# Patient Record
Sex: FEMALE | Race: BLACK OR AFRICAN AMERICAN | Employment: UNEMPLOYED | URBAN - METROPOLITAN AREA
[De-identification: names, ages, dates, MRNs, and addresses within clinical notes are randomized per-mention and may not be internally consistent; named-entity substitution may affect disease eponyms.]

---

## 2021-11-03 ENCOUNTER — HOSPITAL ENCOUNTER (INPATIENT)
Age: 71
LOS: 15 days | Discharge: HOME | DRG: 177 | End: 2021-11-18
Attending: EMERGENCY MEDICINE | Admitting: INTERNAL MEDICINE
Payer: MEDICARE

## 2021-11-03 ENCOUNTER — APPOINTMENT (OUTPATIENT)
Dept: GENERAL RADIOLOGY | Age: 71
DRG: 177 | End: 2021-11-03
Attending: PHYSICIAN ASSISTANT
Payer: MEDICARE

## 2021-11-03 ENCOUNTER — APPOINTMENT (OUTPATIENT)
Dept: CT IMAGING | Age: 71
DRG: 177 | End: 2021-11-03
Attending: PHYSICIAN ASSISTANT
Payer: MEDICARE

## 2021-11-03 DIAGNOSIS — J12.82 PNEUMONIA DUE TO COVID-19 VIRUS: Primary | ICD-10-CM

## 2021-11-03 DIAGNOSIS — R06.02 SOB (SHORTNESS OF BREATH): ICD-10-CM

## 2021-11-03 DIAGNOSIS — U07.1 PNEUMONIA DUE TO COVID-19 VIRUS: Primary | ICD-10-CM

## 2021-11-03 DIAGNOSIS — J96.01 ACUTE RESPIRATORY FAILURE WITH HYPOXIA (HCC): ICD-10-CM

## 2021-11-03 DIAGNOSIS — I10 ESSENTIAL HYPERTENSION: ICD-10-CM

## 2021-11-03 DIAGNOSIS — E87.6 HYPOKALEMIA: ICD-10-CM

## 2021-11-03 PROBLEM — J96.00 ACUTE RESPIRATORY FAILURE DUE TO COVID-19 (HCC): Status: ACTIVE | Noted: 2021-11-03

## 2021-11-03 PROBLEM — E11.9 DIABETES MELLITUS TYPE 2, CONTROLLED (HCC): Status: ACTIVE | Noted: 2021-11-03

## 2021-11-03 LAB
ALBUMIN SERPL-MCNC: 3 G/DL (ref 3.4–5)
ALBUMIN/GLOB SERPL: 0.7 {RATIO} (ref 0.8–1.7)
ALP SERPL-CCNC: 101 U/L (ref 45–117)
ALT SERPL-CCNC: 56 U/L (ref 13–56)
ANION GAP SERPL CALC-SCNC: 5 MMOL/L (ref 3–18)
AST SERPL-CCNC: 155 U/L (ref 10–38)
BASE EXCESS BLD CALC-SCNC: 3.8 MMOL/L
BASOPHILS # BLD: 0 K/UL (ref 0–0.1)
BASOPHILS NFR BLD: 0 % (ref 0–2)
BDY SITE: ABNORMAL
BILIRUB SERPL-MCNC: 1.1 MG/DL (ref 0.2–1)
BUN SERPL-MCNC: 15 MG/DL (ref 7–18)
BUN/CREAT SERPL: 18 (ref 12–20)
CALCIUM SERPL-MCNC: 8.4 MG/DL (ref 8.5–10.1)
CHLORIDE SERPL-SCNC: 96 MMOL/L (ref 100–111)
CK SERPL-CCNC: 2873 U/L (ref 26–192)
CO2 SERPL-SCNC: 30 MMOL/L (ref 21–32)
COVID-19 RAPID TEST, COVR: DETECTED
CREAT SERPL-MCNC: 0.84 MG/DL (ref 0.6–1.3)
D DIMER PPP FEU-MCNC: 7.84 UG/ML(FEU)
DIFFERENTIAL METHOD BLD: ABNORMAL
EOSINOPHIL # BLD: 0 K/UL (ref 0–0.4)
EOSINOPHIL NFR BLD: 0 % (ref 0–5)
ERYTHROCYTE [DISTWIDTH] IN BLOOD BY AUTOMATED COUNT: 12.9 % (ref 11.6–14.5)
FLUAV AG NPH QL IA: NEGATIVE
FLUBV AG NOSE QL IA: NEGATIVE
GAS FLOW.O2 O2 DELIVERY SYS: ABNORMAL L/MIN
GLOBULIN SER CALC-MCNC: 4.3 G/DL (ref 2–4)
GLUCOSE SERPL-MCNC: 122 MG/DL (ref 74–99)
HCO3 BLD-SCNC: 26.9 MMOL/L (ref 22–26)
HCT VFR BLD AUTO: 35.8 % (ref 35–45)
HGB BLD-MCNC: 11 G/DL (ref 12–16)
LACTATE BLD-SCNC: 1.94 MMOL/L (ref 0.4–2)
LYMPHOCYTES # BLD: 1.6 K/UL (ref 0.9–3.6)
LYMPHOCYTES NFR BLD: 24 % (ref 21–52)
MAGNESIUM SERPL-MCNC: 1.8 MG/DL (ref 1.6–2.6)
MCH RBC QN AUTO: 26.3 PG (ref 24–34)
MCHC RBC AUTO-ENTMCNC: 30.7 G/DL (ref 31–37)
MCV RBC AUTO: 85.6 FL (ref 78–100)
MONOCYTES # BLD: 0.4 K/UL (ref 0.05–1.2)
MONOCYTES NFR BLD: 6 % (ref 3–10)
NEUTS SEG # BLD: 4.6 K/UL (ref 1.8–8)
NEUTS SEG NFR BLD: 69 % (ref 40–73)
PCO2 BLD: 34.5 MMHG (ref 35–45)
PH BLD: 7.5 [PH] (ref 7.35–7.45)
PLATELET # BLD AUTO: 175 K/UL (ref 135–420)
PMV BLD AUTO: 11.3 FL (ref 9.2–11.8)
PO2 BLD: 50 MMHG (ref 80–100)
POTASSIUM SERPL-SCNC: 3.3 MMOL/L (ref 3.5–5.5)
PROT SERPL-MCNC: 7.3 G/DL (ref 6.4–8.2)
RBC # BLD AUTO: 4.18 M/UL (ref 4.2–5.3)
SAO2 % BLD: 88.6 % (ref 92–97)
SERVICE CMNT-IMP: ABNORMAL
SODIUM SERPL-SCNC: 131 MMOL/L (ref 136–145)
SOURCE, COVRS: ABNORMAL
SPECIMEN TYPE: ABNORMAL
WBC # BLD AUTO: 6.7 K/UL (ref 4.6–13.2)

## 2021-11-03 PROCEDURE — 82550 ASSAY OF CK (CPK): CPT

## 2021-11-03 PROCEDURE — 36600 WITHDRAWAL OF ARTERIAL BLOOD: CPT

## 2021-11-03 PROCEDURE — 83520 IMMUNOASSAY QUANT NOS NONAB: CPT

## 2021-11-03 PROCEDURE — 85379 FIBRIN DEGRADATION QUANT: CPT

## 2021-11-03 PROCEDURE — 83605 ASSAY OF LACTIC ACID: CPT

## 2021-11-03 PROCEDURE — 84145 PROCALCITONIN (PCT): CPT

## 2021-11-03 PROCEDURE — 96360 HYDRATION IV INFUSION INIT: CPT

## 2021-11-03 PROCEDURE — 74011250636 HC RX REV CODE- 250/636: Performed by: PHYSICIAN ASSISTANT

## 2021-11-03 PROCEDURE — 71045 X-RAY EXAM CHEST 1 VIEW: CPT

## 2021-11-03 PROCEDURE — 82728 ASSAY OF FERRITIN: CPT

## 2021-11-03 PROCEDURE — 87040 BLOOD CULTURE FOR BACTERIA: CPT

## 2021-11-03 PROCEDURE — 74011250637 HC RX REV CODE- 250/637: Performed by: INTERNAL MEDICINE

## 2021-11-03 PROCEDURE — 65270000029 HC RM PRIVATE

## 2021-11-03 PROCEDURE — 85025 COMPLETE CBC W/AUTO DIFF WBC: CPT

## 2021-11-03 PROCEDURE — 99285 EMERGENCY DEPT VISIT HI MDM: CPT

## 2021-11-03 PROCEDURE — 83735 ASSAY OF MAGNESIUM: CPT

## 2021-11-03 PROCEDURE — 87635 SARS-COV-2 COVID-19 AMP PRB: CPT

## 2021-11-03 PROCEDURE — 87804 INFLUENZA ASSAY W/OPTIC: CPT

## 2021-11-03 PROCEDURE — 74011250637 HC RX REV CODE- 250/637: Performed by: PHYSICIAN ASSISTANT

## 2021-11-03 PROCEDURE — 80053 COMPREHEN METABOLIC PANEL: CPT

## 2021-11-03 PROCEDURE — 74011000636 HC RX REV CODE- 636: Performed by: INTERNAL MEDICINE

## 2021-11-03 PROCEDURE — 83615 LACTATE (LD) (LDH) ENZYME: CPT

## 2021-11-03 PROCEDURE — 82803 BLOOD GASES ANY COMBINATION: CPT

## 2021-11-03 PROCEDURE — 65660000000 HC RM CCU STEPDOWN

## 2021-11-03 PROCEDURE — 86140 C-REACTIVE PROTEIN: CPT

## 2021-11-03 PROCEDURE — 83036 HEMOGLOBIN GLYCOSYLATED A1C: CPT

## 2021-11-03 PROCEDURE — 71275 CT ANGIOGRAPHY CHEST: CPT

## 2021-11-03 PROCEDURE — XW033E5 INTRODUCTION OF REMDESIVIR ANTI-INFECTIVE INTO PERIPHERAL VEIN, PERCUTANEOUS APPROACH, NEW TECHNOLOGY GROUP 5: ICD-10-PCS | Performed by: INTERNAL MEDICINE

## 2021-11-03 RX ORDER — ACETAMINOPHEN 650 MG/1
650 SUPPOSITORY RECTAL
Status: DISCONTINUED | OUTPATIENT
Start: 2021-11-03 | End: 2021-11-04 | Stop reason: SDUPTHER

## 2021-11-03 RX ORDER — PANTOPRAZOLE SODIUM 40 MG/10ML
40 INJECTION, POWDER, LYOPHILIZED, FOR SOLUTION INTRAVENOUS DAILY
Status: DISCONTINUED | OUTPATIENT
Start: 2021-11-04 | End: 2021-11-08 | Stop reason: DRUGHIGH

## 2021-11-03 RX ORDER — MELATONIN
2000 DAILY
Status: DISCONTINUED | OUTPATIENT
Start: 2021-11-04 | End: 2021-11-18 | Stop reason: HOSPADM

## 2021-11-03 RX ORDER — POLYETHYLENE GLYCOL 3350 17 G/17G
17 POWDER, FOR SOLUTION ORAL DAILY PRN
Status: DISCONTINUED | OUTPATIENT
Start: 2021-11-03 | End: 2021-11-18 | Stop reason: HOSPADM

## 2021-11-03 RX ORDER — DEXAMETHASONE SODIUM PHOSPHATE 4 MG/ML
6 INJECTION, SOLUTION INTRA-ARTICULAR; INTRALESIONAL; INTRAMUSCULAR; INTRAVENOUS; SOFT TISSUE EVERY 24 HOURS
Status: DISCONTINUED | OUTPATIENT
Start: 2021-11-03 | End: 2021-11-04

## 2021-11-03 RX ORDER — ONDANSETRON 2 MG/ML
4 INJECTION INTRAMUSCULAR; INTRAVENOUS
Status: DISCONTINUED | OUTPATIENT
Start: 2021-11-03 | End: 2021-11-18 | Stop reason: HOSPADM

## 2021-11-03 RX ORDER — SODIUM CHLORIDE 0.9 % (FLUSH) 0.9 %
5-40 SYRINGE (ML) INJECTION EVERY 8 HOURS
Status: DISCONTINUED | OUTPATIENT
Start: 2021-11-03 | End: 2021-11-18 | Stop reason: HOSPADM

## 2021-11-03 RX ORDER — POTASSIUM CHLORIDE 20 MEQ/1
40 TABLET, EXTENDED RELEASE ORAL EVERY 4 HOURS
Status: COMPLETED | OUTPATIENT
Start: 2021-11-03 | End: 2021-11-04

## 2021-11-03 RX ORDER — ENOXAPARIN SODIUM 100 MG/ML
30 INJECTION SUBCUTANEOUS EVERY 12 HOURS
Status: DISCONTINUED | OUTPATIENT
Start: 2021-11-03 | End: 2021-11-18 | Stop reason: HOSPADM

## 2021-11-03 RX ORDER — SODIUM CHLORIDE 0.9 % (FLUSH) 0.9 %
5-40 SYRINGE (ML) INJECTION AS NEEDED
Status: DISCONTINUED | OUTPATIENT
Start: 2021-11-03 | End: 2021-11-18 | Stop reason: HOSPADM

## 2021-11-03 RX ORDER — ZINC SULFATE 50(220)MG
1 CAPSULE ORAL DAILY
Status: DISCONTINUED | OUTPATIENT
Start: 2021-11-04 | End: 2021-11-18 | Stop reason: HOSPADM

## 2021-11-03 RX ORDER — DEXTROSE 50 % IN WATER (D50W) INTRAVENOUS SYRINGE
25-50 AS NEEDED
Status: DISCONTINUED | OUTPATIENT
Start: 2021-11-03 | End: 2021-11-18 | Stop reason: HOSPADM

## 2021-11-03 RX ORDER — GUAIFENESIN/DEXTROMETHORPHAN 100-10MG/5
5 SYRUP ORAL
Status: DISCONTINUED | OUTPATIENT
Start: 2021-11-03 | End: 2021-11-18 | Stop reason: HOSPADM

## 2021-11-03 RX ORDER — CALCIUM CARB/MAGNESIUM CARB 311-232MG
10 TABLET ORAL
Status: DISCONTINUED | OUTPATIENT
Start: 2021-11-03 | End: 2021-11-18 | Stop reason: HOSPADM

## 2021-11-03 RX ORDER — ACETAMINOPHEN 325 MG/1
650 TABLET ORAL
Status: DISCONTINUED | OUTPATIENT
Start: 2021-11-03 | End: 2021-11-04 | Stop reason: SDUPTHER

## 2021-11-03 RX ORDER — SODIUM CHLORIDE 0.9 % (FLUSH) 0.9 %
5-10 SYRINGE (ML) INJECTION AS NEEDED
Status: DISCONTINUED | OUTPATIENT
Start: 2021-11-03 | End: 2021-11-18 | Stop reason: HOSPADM

## 2021-11-03 RX ORDER — HYDRALAZINE HYDROCHLORIDE 20 MG/ML
10 INJECTION INTRAMUSCULAR; INTRAVENOUS
Status: DISCONTINUED | OUTPATIENT
Start: 2021-11-03 | End: 2021-11-18 | Stop reason: HOSPADM

## 2021-11-03 RX ORDER — ACETAMINOPHEN 325 MG/1
650 TABLET ORAL
Status: DISCONTINUED | OUTPATIENT
Start: 2021-11-03 | End: 2021-11-18 | Stop reason: HOSPADM

## 2021-11-03 RX ORDER — ONDANSETRON 4 MG/1
4 TABLET, ORALLY DISINTEGRATING ORAL
Status: DISCONTINUED | OUTPATIENT
Start: 2021-11-03 | End: 2021-11-18 | Stop reason: HOSPADM

## 2021-11-03 RX ORDER — ACETAMINOPHEN 325 MG/1
650 TABLET ORAL
Status: COMPLETED | OUTPATIENT
Start: 2021-11-03 | End: 2021-11-03

## 2021-11-03 RX ORDER — INSULIN LISPRO 100 [IU]/ML
INJECTION, SOLUTION INTRAVENOUS; SUBCUTANEOUS
Status: DISCONTINUED | OUTPATIENT
Start: 2021-11-03 | End: 2021-11-18 | Stop reason: HOSPADM

## 2021-11-03 RX ORDER — MAGNESIUM SULFATE 100 %
4 CRYSTALS MISCELLANEOUS AS NEEDED
Status: DISCONTINUED | OUTPATIENT
Start: 2021-11-03 | End: 2021-11-18 | Stop reason: HOSPADM

## 2021-11-03 RX ORDER — ACETAMINOPHEN 650 MG/1
650 SUPPOSITORY RECTAL
Status: DISCONTINUED | OUTPATIENT
Start: 2021-11-03 | End: 2021-11-18 | Stop reason: HOSPADM

## 2021-11-03 RX ORDER — ASCORBIC ACID 250 MG
500 TABLET ORAL 2 TIMES DAILY
Status: DISCONTINUED | OUTPATIENT
Start: 2021-11-03 | End: 2021-11-18 | Stop reason: HOSPADM

## 2021-11-03 RX ADMIN — SODIUM CHLORIDE 436 ML: 900 INJECTION, SOLUTION INTRAVENOUS at 18:17

## 2021-11-03 RX ADMIN — ACETAMINOPHEN 650 MG: 325 TABLET ORAL at 18:22

## 2021-11-03 RX ADMIN — SODIUM CHLORIDE 1000 ML: 900 INJECTION, SOLUTION INTRAVENOUS at 18:17

## 2021-11-03 RX ADMIN — POTASSIUM CHLORIDE 40 MEQ: 20 TABLET, EXTENDED RELEASE ORAL at 22:00

## 2021-11-03 RX ADMIN — IOPAMIDOL 70 ML: 755 INJECTION, SOLUTION INTRAVENOUS at 20:47

## 2021-11-03 NOTE — H&P
History & Physical    Patient: Gabe Gallo MRN: [id number]  CSN: [id number]    YOB: 1950  Age: 70 y.o. Sex: female      DOA: 11/3/2021    Chief Complaint:   Chief Complaint   Patient presents with    Shortness of Breath          HPI:     Gabe Gallo is a 70 y.o.  female from [facility] non-English speaking who has history of hypertension, hyperlipidemia and diabetes presents to the emergency room with worsening cough shortness of breath and fever for the last 4 days she has several grandchildren and children were sick with Covid. She is unvaccinated. In the emergency room she was found to have sats of 88% which improved with 5 L of oxygen. CTA of the chest was done and was negative for pulmonary embolism. History is obtained from patient,  and her son-in-law  Past Medical History:   Diagnosis Date    Diabetes ([address]     HTN (hypertension)     Hyperlipidemia        History reviewed. No pertinent surgical history. Family History   Problem Relation Age of Onset    Hypertension Mother        Social History     Socioeconomic History    Marital status:    Tobacco Use    Smoking status: Never Smoker    Smokeless tobacco: Never Used   Substance and Sexual Activity    Alcohol use: Yes    Drug use: Never    Sexual activity: Not Currently       Prior to Admission medications    Not on File       No Known Allergies      Review of Systems  GENERAL: Patient alert, awake and oriented times 3, able to communicate full sentences and not in distress. HEENT: No change in vision, no earache, tinnitus, sore throat or sinus congestion. NECK: No pain or stiffness. PULMONARY: +shortness of breath, +cough or wheeze. Cardiovascular: no pnd or orthopnea, no CP  GASTROINTESTINAL: No abdominal pain, nausea, vomiting or diarrhea, melena or bright red blood per rectum. GENITOURINARY: No urinary frequency, urgency, hesitancy or dysuria.    MUSCULOSKELETAL: No joint or muscle pain, no back pain, no recent trauma. DERMATOLOGIC: No rash, no itching, no lesions. ENDOCRINE: No polyuria, polydipsia, no heat or cold intolerance. No recent change in weight. HEMATOLOGICAL: No anemia or easy bruising or bleeding. NEUROLOGIC: No headache, seizures, numbness, tingling +weakness. Physical Exam:     Physical Exam:  Visit Vitals  BP (!) 149/64   Pulse 85   Temp 98.5 °F (36.9 °C)   Resp 22   Wt 81.2 kg (179 lb)   SpO2 100%      O2 Device: None (Room air)    Temp (24hrs), Av.7 °F (37.6 °C), Min:98 °F (36.7 °C), Max:102.5 °F (39.2 °C)    No intake/output data recorded. No intake/output data recorded. General:  Alert, cooperative, no distress, appears stated age. Head: Normocephalic, without obvious abnormality, atraumatic. Eyes:  Conjunctivae/corneas clear. PERRL, EOMs intact. Nose: Nares normal. No drainage or sinus tenderness. Neck: Supple, symmetrical, trachea midline, no adenopathy, thyroid: no enlargement, no carotid bruit and no JVD. Lungs:   Clear to auscultation bilaterally. Heart:  Regular rate and rhythm, S1, S2 normal.     Abdomen: Soft, non-tender. Bowel sounds normal.    Extremities: Extremities normal, atraumatic, no cyanosis or edema. Pulses: 2+ and symmetric all extremities. Skin:  No rashes or lesions   Neurologic: AAOx3, No focal motor or sensory deficit. Labs Reviewed: All lab results for the last 24 hours reviewed.    and EKG    Procedures/imaging: see electronic medical records for all procedures/Xrays and details which were not copied into this note but were reviewed prior to creation of Plan  Recent Results (from the past 12 hour(s))   CBC WITH AUTOMATED DIFF    Collection Time: 21  5:46 PM   Result Value Ref Range    WBC 6.7 4.6 - 13.2 K/uL    RBC 4.18 (L) 4.20 - 5.30 M/uL    HGB 11.0 (L) 12.0 - 16.0 g/dL    HCT 35.8 35.0 - 45.0 %    MCV 85.6 78.0 - 100.0 FL    MCH 26.3 24.0 - 34.0 PG    MCHC 30.7 (L) 31.0 - 37.0 g/dL    RDW 12.9 11.6 - 14.5 %    PLATELET 287 949 - 530 K/uL    MPV 11.3 9.2 - 11.8 FL    NEUTROPHILS 69 40 - 73 %    LYMPHOCYTES 24 21 - 52 %    MONOCYTES 6 3 - 10 %    EOSINOPHILS 0 0 - 5 %    BASOPHILS 0 0 - 2 %    ABS. NEUTROPHILS 4.6 1.8 - 8.0 K/UL    ABS. LYMPHOCYTES 1.6 0.9 - 3.6 K/UL    ABS. MONOCYTES 0.4 0.05 - 1.2 K/UL    ABS. EOSINOPHILS 0.0 0.0 - 0.4 K/UL    ABS. BASOPHILS 0.0 0.0 - 0.1 K/UL    DF AUTOMATED     METABOLIC PANEL, COMPREHENSIVE    Collection Time: 11/03/21  5:46 PM   Result Value Ref Range    Sodium 131 (L) 136 - 145 mmol/L    Potassium 3.3 (L) 3.5 - 5.5 mmol/L    Chloride 96 (L) 100 - 111 mmol/L    CO2 30 21 - 32 mmol/L    Anion gap 5 3.0 - 18 mmol/L    Glucose 122 (H) 74 - 99 mg/dL    BUN 15 7.0 - 18 MG/DL    Creatinine 0.84 0.6 - 1.3 MG/DL    BUN/Creatinine ratio 18 12 - 20      GFR est AA >60 >60 ml/min/1.73m2    GFR est non-AA >60 >60 ml/min/1.73m2    Calcium 8.4 (L) 8.5 - 10.1 MG/DL    Bilirubin, total 1.1 (H) 0.2 - 1.0 MG/DL    ALT (SGPT) 56 13 - 56 U/L    AST (SGOT) 155 (H) 10 - 38 U/L    Alk.  phosphatase 101 45 - 117 U/L    Protein, total 7.3 6.4 - 8.2 g/dL    Albumin 3.0 (L) 3.4 - 5.0 g/dL    Globulin 4.3 (H) 2.0 - 4.0 g/dL    A-G Ratio 0.7 (L) 0.8 - 1.7     MAGNESIUM    Collection Time: 11/03/21  5:46 PM   Result Value Ref Range    Magnesium 1.8 1.6 - 2.6 mg/dL   D DIMER    Collection Time: 11/03/21  5:46 PM   Result Value Ref Range    D DIMER 7.84 (H) <0.46 ug/ml(FEU)   CK    Collection Time: 11/03/21  5:46 PM   Result Value Ref Range    CK 2,873 (H) 26 - 192 U/L   POC LACTIC ACID    Collection Time: 11/03/21  5:55 PM   Result Value Ref Range    Lactic Acid (POC) 1.94 0.40 - 2.00 mmol/L   BLOOD GAS, ARTERIAL POC    Collection Time: 11/03/21  6:11 PM   Result Value Ref Range    Device: ROOM AIR      pH (POC) 7.50 (H) 7.35 - 7.45      pCO2 (POC) 34.5 (L) 35.0 - 45.0 MMHG    pO2 (POC) 50 (L) 80 - 100 MMHG    HCO3 (POC) 26.9 (H) 22 - 26 MMOL/L    sO2 (POC) 88.6 (L) 92 - 97 %    Base excess (POC) 3.8 mmol/L    Site LEFT RADIAL      Specimen type (POC) ARTERIAL      Performed by Christopher Armendariz    COVID-19 RAPID TEST    Collection Time: 11/03/21  6:13 PM   Result Value Ref Range    Specimen source Nasopharyngeal      COVID-19 rapid test Detected (AA) NOTD     INFLUENZA A & B AG (RAPID TEST)    Collection Time: 11/03/21  6:13 PM   Result Value Ref Range    Influenza A Antigen Negative NEG      Influenza B Antigen Negative NEG         Assessment/Plan     Principal Problem:    Pneumonia due to COVID-19 virus (11/3/2021)    Active Problems:    Acute respiratory failure due to COVID-19 [facility]) (11/3/2021)    Hypertension    Diabetes    Leg Pain      Plan:  r sep      Pneumonia due to Covid  Admit to telemetry  Check cardiac enzymes and echo  Check duplex of lower extremity  Start intermediate dose Lovenox  IV Decadron 6 mg for 10 days  Start remdesivir  Obtain ID consult    Hyperglycemia  Check sliding scale insulin  Not currently on diabetic medicines  Expect this may go up due to steroids      Hypertension  Resume home medicines   hydralazine as needed      Mild increase in liver test  May be due from fatty liver check total CK  And cardiac enzyme  Check liver ultrasound and hepatitis panel  Not high enough to not   Start remdesivir      Hypokalemia  IV and magnesium replacement protocol  Hold diuretics  DVT/GI Prophylaxis: Lovenox Protonix        Christa Peter MD  11/3/2021 7:31 PM

## 2021-11-03 NOTE — Clinical Note
Status[de-identified] INPATIENT [101] Type of Bed: Telemetry [19] Cardiac Monitoring Required?: No 
 Inpatient Hospitalization Certified Necessary for the Following Reasons: 3. Patient receiving treatment that can only be provided in an inpatient setting (further clarification in H&P documentation) Admitting Diagnosis: Pneumonia due to COVID-19 virus [[id number]] Admitting Diagnosis: Acute respiratory failure due to COVID-19 [facility] [[id number]] Admitting Physician: Sherice Echevarria [[id number]] Attending Physician: Sherice Echevarria [[id number]] Estimated Length of Stay: 3-4 Midnights Discharge Plan[de-identified] Home with Office Follow-up

## 2021-11-03 NOTE — ED PROVIDER NOTES
EMERGENCY DEPARTMENT HISTORY AND PHYSICAL EXAM    Date: 11/3/2021  Patient Name: Servando Blackburn    History of Presenting Illness     Time Seen: 8631    Chief Complaint   Patient presents with    Shortness of Breath       History Provided By: Patient's Son    Additional History (Context):   Servando Blackburn is a 70 y.o. female presents emergency room with family with complaints of high fever, productive cough. Patient does not speak any [place]. Her son is acting as . Patient was actually sent to us by a local family practice office. History of hypertension, obesity, hyperlipidemia and prediabetes. Was found to be hypoxic and febrile. Patient was complaining of cough, shortness of breath and body aches to them. Her oxygen saturation on room air was 86%. They evaluated this patient in the car. They sent her over here with concerns of Covid. Evidently there are other family members with similar symptoms. Patient is not vaccinated. Productive cough. No obvious sputum. Tired. No dizziness or lightheadedness. No complaints of chest pain. No abdominal pain. Decreased appetite and fluid intake.     PCP: Adeola, MD Hiram    Current Facility-Administered Medications   Medication Dose Route Frequency Provider Last Rate Last Admin    sodium chloride (NS) flush 5-10 mL  5-10 mL IntraVENous PRN Pedro Luis Dunham PA        potassium chloride (K-DUR, KLOR-CON) SR tablet 40 mEq  40 mEq Oral Q4H Shirley Zaragoza MD        dexamethasone (DECADRON) 4 mg/mL injection 6 mg  6 mg IntraVENous Q24H Shirley Zaragoza MD        enoxaparin (LOVENOX) injection 30 mg  30 mg SubCUTAneous Q12H Shirley Zaragoza MD        acetaminophen (TYLENOL) tablet 650 mg  650 mg Oral Q6H PRN Shirley Zaragoza MD        Or   Dosmargaret Jennifer acetaminophen (TYLENOL) suppository 650 mg  650 mg Rectal Q6H PRN Shirley Zaragoza MD        guaiFENesin-dextromethorphan (ROBITUSSIN DM) 100-10 mg/5 mL syrup 5 mL  5 mL Oral Q4H PRN Sam Zaragoza MD        Alma Holiday ON 11/4/2021] cholecalciferol (VITAMIN D3) (1000 Units /25 mcg) tablet 2,000 Units  2,000 Units Oral DAILY Sam Zaragoza MD        insulin lispro (HUMALOG) injection   SubCUTAneous AC&HS Sam Zaragoza MD        glucose chewable tablet 16 g  4 Tablet Oral PRN Sam Zaragoza MD        glucagon (GLUCAGEN) injection 1 mg  1 mg IntraMUSCular PRN Sam Zaragoza MD        dextrose (D50W) injection syrg 12.5-25 g  25-50 mL IntraVENous PRN Sam Zaragoza MD        sodium chloride (NS) flush 5-40 mL  5-40 mL IntraVENous Q8H Sam Zaragoza MD        sodium chloride (NS) flush 5-40 mL  5-40 mL IntraVENous PRN Sam Zaragoza MD        acetaminophen (TYLENOL) tablet 650 mg  650 mg Oral Q6H PRN Sam Zaragoza MD        Or   [facility] acetaminophen (TYLENOL) suppository 650 mg  650 mg Rectal Q6H PRN Sam Zaragoza MD        polyethylene glycol (MIRALAX) packet 17 g  17 g Oral DAILY PRN Sam Zaragoza MD        ondansetron (ZOFRAN ODT) tablet 4 mg  4 mg Oral Q8H PRN Sam Zaragoza MD        Or    ondansetron (ZOFRAN) injection 4 mg  4 mg IntraVENous Q6H PRN Sam Zaragoza MD           Past History     Past Medical History:  No past medical history on file. Past Surgical History:  No past surgical history on file. Family History:  No family history on file. Social History:  Social History     Tobacco Use    Smoking status: Not on file    Smokeless tobacco: Not on file   Substance Use Topics    Alcohol use: Not on file    Drug use: Not on file       Allergies:  No Known Allergies      Review of Systems   Review of Systems   Constitutional: Positive for chills, fatigue and fever. HENT: Negative for congestion, ear pain, sinus pressure, sinus pain, sneezing and sore throat. Respiratory: Positive for cough, chest tightness and shortness of breath. Negative for wheezing. Cardiovascular: Negative for chest pain and palpitations. Gastrointestinal: Positive for abdominal pain. Negative for diarrhea, nausea and vomiting. Genitourinary: Negative for decreased urine volume, dysuria, flank pain, frequency, hematuria and urgency. Musculoskeletal: Positive for myalgias. Neurological: Negative for dizziness, light-headedness and headaches. Physical Exam     Vitals:    11/03/21 1852 11/03/21 1900 11/03/21 1922 11/03/21 1952   BP:  (!) 140/67 139/62    Pulse: 86 85 81 79   Resp: 24 22 24 24   Temp:   98 °F (36.7 °C)    SpO2: 100% 100% 100% 99%   Weight:         Physical Exam  Vitals and nursing note reviewed. Constitutional:       General: She is not in acute distress. Appearance: She is well-developed, well-groomed and overweight. She is not ill-appearing or diaphoretic. Comments: 68-year-old female in the room with her son. Vital signs show her to be hypoxic. Oxygen saturation in the upper 80s. First initial blood pressure was 80 systolic. However this was repeated and blood pressure was much more normal.  Slightly tachypneic. HENT:      Nose: Nose normal. No congestion or rhinorrhea. Mouth/Throat:      Mouth: Mucous membranes are moist.      Pharynx: Oropharynx is clear. Eyes:      Extraocular Movements: Extraocular movements intact. Conjunctiva/sclera: Conjunctivae normal.      Pupils: Pupils are equal, round, and reactive to light. Cardiovascular:      Rate and Rhythm: Normal rate and regular rhythm. Pulses: Normal pulses. Heart sounds: Normal heart sounds. Pulmonary:      Effort: Tachypnea present. No accessory muscle usage, prolonged expiration or respiratory distress. Breath sounds: Decreased air movement present. Examination of the right-upper field reveals decreased breath sounds. Examination of the left-upper field reveals decreased breath sounds. Examination of the right-middle field reveals decreased breath sounds. Examination of the left-middle field reveals decreased breath sounds. Examination of the right-lower field reveals decreased breath sounds and rales. Examination of the left-lower field reveals decreased breath sounds and rales. Decreased breath sounds and rales present. No wheezing or rhonchi. Comments: Crackles heard predominantly in the lung bases. Abdominal:      General: Bowel sounds are normal.      Palpations: Abdomen is soft. Tenderness: There is no abdominal tenderness. There is no right CVA tenderness or left CVA tenderness. Musculoskeletal:         General: No swelling or tenderness. Normal range of motion. Cervical back: Neck supple. Right lower leg: No edema. Left lower leg: No edema. Lymphadenopathy:      Cervical: No cervical adenopathy. Skin:     General: Skin is warm and dry. Capillary Refill: Capillary refill takes less than 2 seconds. Neurological:      Mental Status: She is alert and oriented to person, place, and time. Psychiatric:         Mood and Affect: Mood normal.         Behavior: Behavior normal. Behavior is cooperative. Nursing note and vitals reviewed         Diagnostic Study Results     Labs -     Recent Results (from the past 12 hour(s))   CBC WITH AUTOMATED DIFF    Collection Time: 11/03/21  5:46 PM   Result Value Ref Range    WBC 6.7 4.6 - 13.2 K/uL    RBC 4.18 (L) 4.20 - 5.30 M/uL    HGB 11.0 (L) 12.0 - 16.0 g/dL    HCT 35.8 35.0 - 45.0 %    MCV 85.6 78.0 - 100.0 FL    MCH 26.3 24.0 - 34.0 PG    MCHC 30.7 (L) 31.0 - 37.0 g/dL    RDW 12.9 11.6 - 14.5 %    PLATELET 377 643 - 072 K/uL    MPV 11.3 9.2 - 11.8 FL    NEUTROPHILS 69 40 - 73 %    LYMPHOCYTES 24 21 - 52 %    MONOCYTES 6 3 - 10 %    EOSINOPHILS 0 0 - 5 %    BASOPHILS 0 0 - 2 %    ABS. NEUTROPHILS 4.6 1.8 - 8.0 K/UL    ABS. LYMPHOCYTES 1.6 0.9 - 3.6 K/UL    ABS. MONOCYTES 0.4 0.05 - 1.2 K/UL    ABS. EOSINOPHILS 0.0 0.0 - 0.4 K/UL    ABS.  BASOPHILS 0.0 0.0 - 0.1 K/UL    DF AUTOMATED     METABOLIC PANEL, COMPREHENSIVE    Collection Time: 11/03/21  5:46 PM   Result Value Ref Range    Sodium 131 (L) 136 - 145 mmol/L    Potassium 3.3 (L) 3.5 - 5.5 mmol/L    Chloride 96 (L) 100 - 111 mmol/L    CO2 30 21 - 32 mmol/L    Anion gap 5 3.0 - 18 mmol/L    Glucose 122 (H) 74 - 99 mg/dL    BUN 15 7.0 - 18 MG/DL    Creatinine 0.84 0.6 - 1.3 MG/DL    BUN/Creatinine ratio 18 12 - 20      GFR est AA >60 >60 ml/min/1.73m2    GFR est non-AA >60 >60 ml/min/1.73m2    Calcium 8.4 (L) 8.5 - 10.1 MG/DL    Bilirubin, total 1.1 (H) 0.2 - 1.0 MG/DL    ALT (SGPT) 56 13 - 56 U/L    AST (SGOT) 155 (H) 10 - 38 U/L    Alk.  phosphatase 101 45 - 117 U/L    Protein, total 7.3 6.4 - 8.2 g/dL    Albumin 3.0 (L) 3.4 - 5.0 g/dL    Globulin 4.3 (H) 2.0 - 4.0 g/dL    A-G Ratio 0.7 (L) 0.8 - 1.7     MAGNESIUM    Collection Time: 11/03/21  5:46 PM   Result Value Ref Range    Magnesium 1.8 1.6 - 2.6 mg/dL   D DIMER    Collection Time: 11/03/21  5:46 PM   Result Value Ref Range    D DIMER 7.84 (H) <0.46 ug/ml(FEU)   CK    Collection Time: 11/03/21  5:46 PM   Result Value Ref Range    CK 2,873 (H) 26 - 192 U/L   POC LACTIC ACID    Collection Time: 11/03/21  5:55 PM   Result Value Ref Range    Lactic Acid (POC) 1.94 0.40 - 2.00 mmol/L   BLOOD GAS, ARTERIAL POC    Collection Time: 11/03/21  6:11 PM   Result Value Ref Range    Device: ROOM AIR      pH (POC) 7.50 (H) 7.35 - 7.45      pCO2 (POC) 34.5 (L) 35.0 - 45.0 MMHG    pO2 (POC) 50 (L) 80 - 100 MMHG    HCO3 (POC) 26.9 (H) 22 - 26 MMOL/L    sO2 (POC) 88.6 (L) 92 - 97 %    Base excess (POC) 3.8 mmol/L    Site LEFT RADIAL      Specimen type (POC) ARTERIAL      Performed by Teresasandy Chaidez    COVID-19 RAPID TEST    Collection Time: 11/03/21  6:13 PM   Result Value Ref Range    Specimen source Nasopharyngeal      COVID-19 rapid test Detected (AA) NOTD     INFLUENZA A & B AG (RAPID TEST)    Collection Time: 11/03/21  6:13 PM   Result Value Ref Range    Influenza A Antigen Negative NEG Influenza B Antigen Negative NEG         Radiologic Studies   CTA CHEST W OR W WO CONT   Final Result      1. No pulmonary embolism identified. 2. Bilateral multilobar groundglass infiltrates secondary to COVID 19 pneumonia. XR CHEST PORT   Final Result      Patchy bilateral consolidations could relate to COVID 19 pneumonia. DUPLEX LOWER EXT VENOUS BILAT    (Results Pending)     CT Results  (Last 48 hours)               11/03/21 2049  CTA CHEST W OR W WO CONT Final result    Impression:      1. No pulmonary embolism identified. 2. Bilateral multilobar groundglass infiltrates secondary to COVID 19 pneumonia. Narrative:  EXAM: CTA chest       CLINICAL INDICATION/HISTORY: COVID pneumonia, shortness of breath       COMPARISON: Chest radiograph from the same day       TECHNIQUE: Axial CT imaging from the thoracic inlet through the diaphragm with   intravenous contrast. Coronal and sagittal MIP reformats were generated. One or   more dose reduction techniques were used on this CT: automated exposure control,   adjustment of the mAs and/or kVp according to patient size, and iterative   reconstruction techniques. The specific techniques used on this CT exam have   been documented in the patient's electronic medical record. Digital Imaging and   Communications in Medicine (DICOM) format image data are available to   nonaffiliated external healthcare facilities or entities on a secured, media   free, reciprocally searchable basis with patient authorization for at least a   12-month period after this study. _______________       FINDINGS:       EXAM QUALITY: Quit       PULMONARY ARTERIES: No pulmonary embolism identified. MEDIASTINUM: Normal heart size. Aorta is unremarkable. No pericardial effusion. LUNGS: Bilateral multilobar groundglass infiltrates secondary to COVID 19   pneumonia.        AIRWAY: Normal.       PLEURA: Normal.       LYMPH NODES: No enlarged nodes.       UPPER ABDOMEN: Unremarkable. BONES: No acute or aggressive osseous abnormalities identified. OTHER: None.       _______________               CXR Results  (Last 48 hours)               11/03/21 1805  XR CHEST PORT Final result    Impression:      Patchy bilateral consolidations could relate to COVID 19 pneumonia. Narrative:  EXAM: CHEST RADIOGRAPH       CLINICAL INDICATION/HISTORY: meets SIRS criteria     > Additional: Short of breath and cough       COMPARISON: None       TECHNIQUE: Portable frontal view of the chest       _______________       FINDINGS:       SUPPORT DEVICES: None. HEART AND MEDIASTINUM: Heart size is normal.       LUNGS AND PLEURAL SPACES: Patchy bilateral consolidations. No pleural effusion   or pneumothorax. BONES AND SOFT TISSUES: Unremarkable.       _______________                   Medical Decision Making   I am the first provider for this patient. I reviewed the vital signs, available nursing notes, past medical history, past surgical history, family history and social history. Vital Signs-Reviewed the patient's vital signs. Pulse Oximetry Analysis 88% on room air    Records Reviewed: Nursing Notes    DDX:  Acute respiratory failure with hypoxia  Pneumonia  Covid      Provider Notes:   70 y.o. female     Procedures:  Procedures    ED Course:   Initial assessment performed. The patients presenting problems have been discussed, and they are in agreement with the care plan formulated and outlined with them. I have encouraged them to ask questions as they arise throughout their visit. ED Physician Discussion Note:   Tylenol given to get fever down  ABG confirmed hypoxia.   Patient was placed on 5 L nasal cannula by respiratory  Critical result called by lab positive Covid  Chest x-ray consistent with Covid pneumonia  Negative influenza  Mild electrolyte imbalances potassium 3.3    This case was discussed with hospitalist Dr. Efrem Cifuentes Nora Goldberg.  Informed the patient's condition. Agreeing to admit patient to the hospital.  However did request that a CTA of the chest get done due to the possibility of pulmonary embolism related to infection. Also questioned if D-dimer was completed. This was not ordered initially. Son was informed of admission. He was able to translate this to his mother. Diagnosis and Disposition       Core Measures:  For Hospitalized Patients:    1. Hospitalization Decision Time:  The decision to hospitalize the patient was made by Danilo Redmond PA-C  on 11/3/2021      10:33 PM  Patient is being admitted to the hospital by Dr. Dmitriy Salgado. The results of their tests and reasons for their admission have been discussed with them and/or available family. They convey agreement and understanding for the need to be admitted and for their admission diagnosis. CONDITIONS ON ADMISSION:  Sepsis is not present at the time of admission. Deep Vein Thrombosis is not present at the time of admission. Thrombosis is not present at the time of admission. Urinary Tract Infection is not present at the time of admission. Pneumonia is present at the time of admission. MRSA is not present at the time of admission. Wound infection is not present at the time of admission. Pressure Ulcer is not present at the time of admission. CLINICAL IMPRESSION:    1. Pneumonia due to COVID-19 virus    2. Acute respiratory failure with hypoxia (HCC)    3. Essential hypertension    4. Hypokalemia        PLAN:  1. Lois Isabela Dr. Dmitriy Salgado      Please note that this dictation was completed with [employer], the computer voice recognition software. Quite often unanticipated grammatical, syntax, homophones, and other interpretive errors are inadvertently transcribed by the computer software. Please disregard these errors. Please excuse any errors that have escaped final proofreading.

## 2021-11-04 ENCOUNTER — APPOINTMENT (OUTPATIENT)
Dept: VASCULAR SURGERY | Age: 71
DRG: 177 | End: 2021-11-04
Attending: INTERNAL MEDICINE
Payer: MEDICARE

## 2021-11-04 ENCOUNTER — APPOINTMENT (OUTPATIENT)
Dept: NON INVASIVE DIAGNOSTICS | Age: 71
DRG: 177 | End: 2021-11-04
Attending: INTERNAL MEDICINE
Payer: MEDICARE

## 2021-11-04 ENCOUNTER — HOSPITAL ENCOUNTER (INPATIENT)
Dept: ULTRASOUND IMAGING | Age: 71
Discharge: HOME | DRG: 177 | End: 2021-11-04
Attending: INTERNAL MEDICINE
Payer: MEDICARE

## 2021-11-04 LAB
25(OH)D3 SERPL-MCNC: 14.5 NG/ML (ref 30–100)
ALBUMIN SERPL-MCNC: 2.3 G/DL (ref 3.4–5)
ALBUMIN/GLOB SERPL: 0.6 {RATIO} (ref 0.8–1.7)
ALP SERPL-CCNC: 89 U/L (ref 45–117)
ALT SERPL-CCNC: 50 U/L (ref 13–56)
ANION GAP SERPL CALC-SCNC: 6 MMOL/L (ref 3–18)
AST SERPL-CCNC: 144 U/L (ref 10–38)
BASOPHILS # BLD: 0 K/UL (ref 0–0.1)
BASOPHILS NFR BLD: 0 % (ref 0–2)
BILIRUB SERPL-MCNC: 1 MG/DL (ref 0.2–1)
BUN SERPL-MCNC: 11 MG/DL (ref 7–18)
BUN/CREAT SERPL: 19 (ref 12–20)
CALCIUM SERPL-MCNC: 7.9 MG/DL (ref 8.5–10.1)
CHLORIDE SERPL-SCNC: 104 MMOL/L (ref 100–111)
CK MB CFR SERPL CALC: 0.1 % (ref 0–4)
CK MB CFR SERPL CALC: 0.2 % (ref 0–4)
CK MB SERPL-MCNC: 3.3 NG/ML (ref 5–25)
CK MB SERPL-MCNC: 6.1 NG/ML (ref 5–25)
CK SERPL-CCNC: 2661 U/L (ref 26–192)
CK SERPL-CCNC: 2697 U/L (ref 26–192)
CO2 SERPL-SCNC: 26 MMOL/L (ref 21–32)
CREAT SERPL-MCNC: 0.59 MG/DL (ref 0.6–1.3)
CRP SERPL-MCNC: 12.1 MG/DL (ref 0–0.3)
CRP SERPL-MCNC: 12.7 MG/DL (ref 0–0.3)
D DIMER PPP FEU-MCNC: 6.2 UG/ML(FEU)
DIFFERENTIAL METHOD BLD: ABNORMAL
ECHO AO ROOT DIAM: 3.12 CM
ECHO AV AREA PEAK VELOCITY: 1.82 CM2
ECHO AV AREA VTI: 1.62 CM2
ECHO AV MEAN GRADIENT: 5.2 MMHG
ECHO AV PEAK GRADIENT: 10.85 MMHG
ECHO AV PEAK VELOCITY: 164.7 CM/S
ECHO AV VTI: 29.14 CM
ECHO LA AREA 4C: 7.12 CM2
ECHO LA MAJOR AXIS: 3.34 CM
ECHO LA VOL 2C: 21.54 ML (ref 22–52)
ECHO LA VOL 4C: 11.9 ML (ref 22–52)
ECHO LA VOL BP: 17.27 ML (ref 22–52)
ECHO LV E' LATERAL VELOCITY: 9.03 CM/S
ECHO LV E' SEPTAL VELOCITY: 5.82 CM/S
ECHO LV EDV A2C: 73.99 ML
ECHO LV EDV A4C: 62.44 ML
ECHO LV EDV BP: 69.19 ML (ref 56–104)
ECHO LV EJECTION FRACTION A2C: 68 PERCENT
ECHO LV EJECTION FRACTION A4C: 60 PERCENT
ECHO LV EJECTION FRACTION BIPLANE: 64.7 PERCENT (ref 55–100)
ECHO LV ESV A2C: 23.61 ML
ECHO LV ESV A4C: 24.78 ML
ECHO LV ESV BP: 24.45 ML (ref 19–49)
ECHO LV GLOBAL LONGITUDINAL STRAIN (GLS): -17 PERCENT
ECHO LV INTERNAL DIMENSION DIASTOLIC: 4.59 CM (ref 3.9–5.3)
ECHO LV INTERNAL DIMENSION SYSTOLIC: 2.96 CM
ECHO LV IVSD: 0.88 CM (ref 0.6–0.9)
ECHO LV MASS 2D: 121.3 G (ref 67–162)
ECHO LV POSTERIOR WALL DIASTOLIC: 0.76 CM (ref 0.6–0.9)
ECHO LVOT CARDIAC OUTPUT: 4.15 LITER/MINUTE
ECHO LVOT DIAM: 1.8 CM
ECHO LVOT PEAK GRADIENT: 5.49 MMHG
ECHO LVOT PEAK VELOCITY: 117.18 CM/S
ECHO LVOT SV: 47.1 ML
ECHO LVOT SV: 50.4 ML
ECHO LVOT VTI: 18.46 CM
ECHO MV A VELOCITY: 93.23 CM/S
ECHO MV E DECELERATION TIME (DT): 162.88 MS
ECHO MV E VELOCITY: 87.41 CM/S
ECHO MV E/A RATIO: 0.94
ECHO MV E/E' LATERAL: 9.68
ECHO MV E/E' RATIO (AVERAGED): 12.35
ECHO MV E/E' SEPTAL: 15.02
ECHO PV REGURGITANT END DIASTOLIC MAX VELOCITY: 71.58 CM/S
ECHO RA AREA 4C: 7.05 CM2
ECHO RV TAPSE: 1.45 CM (ref 1.5–2)
ECHO RV TAPSE: 2.03 CM (ref 1.5–2)
ECHO TV REGURGITANT MAX VELOCITY: 231.59 CM/S
ECHO TV REGURGITANT PEAK GRADIENT: 21.45 MMHG
EOSINOPHIL # BLD: 0 K/UL (ref 0–0.4)
EOSINOPHIL NFR BLD: 0 % (ref 0–5)
ERYTHROCYTE [DISTWIDTH] IN BLOOD BY AUTOMATED COUNT: 12.8 % (ref 11.6–14.5)
EST. AVERAGE GLUCOSE BLD GHB EST-MCNC: 126 MG/DL
FERRITIN SERPL-MCNC: ABNORMAL NG/ML (ref 8–388)
FIBRINOGEN PPP-MCNC: 465 MG/DL (ref 210–451)
GLOBAL LONGITUDINAL STRAIN 2 CHAMBER: -20.5 PERCENT
GLOBAL LONGITUDINAL STRAIN 4 CHAMBER: -16.7 PERCENT
GLOBAL LONGITUDINAL STRAIN LONG AXIS: -13.8 PERCENT
GLOBULIN SER CALC-MCNC: 3.7 G/DL (ref 2–4)
GLUCOSE BLD STRIP.AUTO-MCNC: 110 MG/DL (ref 70–110)
GLUCOSE BLD STRIP.AUTO-MCNC: 127 MG/DL (ref 70–110)
GLUCOSE BLD STRIP.AUTO-MCNC: 129 MG/DL (ref 70–110)
GLUCOSE BLD STRIP.AUTO-MCNC: 176 MG/DL (ref 70–110)
GLUCOSE SERPL-MCNC: 124 MG/DL (ref 74–99)
HAV IGM SER QL: NEGATIVE
HBA1C MFR BLD: 6 % (ref 4.2–5.6)
HBV CORE IGM SER QL: NEGATIVE
HBV SURFACE AG SER QL: <0.1 INDEX
HBV SURFACE AG SER QL: NEGATIVE
HCT VFR BLD AUTO: 33.4 % (ref 35–45)
HCV AB SER IA-ACNC: 0.13 INDEX
HCV AB SERPL QL IA: NEGATIVE
HCV COMMENT,HCGAC: NORMAL
HGB BLD-MCNC: 10.5 G/DL (ref 12–16)
LA VOL DISK BP: 16.46 ML (ref 22–52)
LDH SERPL L TO P-CCNC: 763 U/L (ref 81–234)
LVOT MG: 2.98 MMHG
LYMPHOCYTES # BLD: 1.5 K/UL (ref 0.9–3.6)
LYMPHOCYTES NFR BLD: 24 % (ref 21–52)
MAGNESIUM SERPL-MCNC: 2 MG/DL (ref 1.6–2.6)
MCH RBC QN AUTO: 26.9 PG (ref 24–34)
MCHC RBC AUTO-ENTMCNC: 31.4 G/DL (ref 31–37)
MCV RBC AUTO: 85.6 FL (ref 78–100)
MONOCYTES # BLD: 0.3 K/UL (ref 0.05–1.2)
MONOCYTES NFR BLD: 4 % (ref 3–10)
NEUTS SEG # BLD: 4.5 K/UL (ref 1.8–8)
NEUTS SEG NFR BLD: 71 % (ref 40–73)
PLATELET # BLD AUTO: 169 K/UL (ref 135–420)
PMV BLD AUTO: 11 FL (ref 9.2–11.8)
POTASSIUM SERPL-SCNC: 3.6 MMOL/L (ref 3.5–5.5)
PROCALCITONIN SERPL-MCNC: 0.22 NG/ML
PROT SERPL-MCNC: 6 G/DL (ref 6.4–8.2)
RBC # BLD AUTO: 3.9 M/UL (ref 4.2–5.3)
SODIUM SERPL-SCNC: 136 MMOL/L (ref 136–145)
SP1: NORMAL
SP2: NORMAL
SP3: NORMAL
TROPONIN I SERPL-MCNC: <0.02 NG/ML (ref 0–0.04)
TROPONIN I SERPL-MCNC: <0.02 NG/ML (ref 0–0.04)
WBC # BLD AUTO: 6.3 K/UL (ref 4.6–13.2)

## 2021-11-04 PROCEDURE — 84145 PROCALCITONIN (PCT): CPT

## 2021-11-04 PROCEDURE — 85384 FIBRINOGEN ACTIVITY: CPT

## 2021-11-04 PROCEDURE — 65660000000 HC RM CCU STEPDOWN

## 2021-11-04 PROCEDURE — 82306 VITAMIN D 25 HYDROXY: CPT

## 2021-11-04 PROCEDURE — 74011636637 HC RX REV CODE- 636/637: Performed by: INTERNAL MEDICINE

## 2021-11-04 PROCEDURE — 74011000250 HC RX REV CODE- 250: Performed by: INTERNAL MEDICINE

## 2021-11-04 PROCEDURE — 82962 GLUCOSE BLOOD TEST: CPT

## 2021-11-04 PROCEDURE — 74011250636 HC RX REV CODE- 250/636: Performed by: INTERNAL MEDICINE

## 2021-11-04 PROCEDURE — 93970 EXTREMITY STUDY: CPT

## 2021-11-04 PROCEDURE — 76705 ECHO EXAM OF ABDOMEN: CPT

## 2021-11-04 PROCEDURE — 74011250636 HC RX REV CODE- 250/636: Performed by: HOSPITALIST

## 2021-11-04 PROCEDURE — 86140 C-REACTIVE PROTEIN: CPT

## 2021-11-04 PROCEDURE — C9113 INJ PANTOPRAZOLE SODIUM, VIA: HCPCS | Performed by: INTERNAL MEDICINE

## 2021-11-04 PROCEDURE — 83735 ASSAY OF MAGNESIUM: CPT

## 2021-11-04 PROCEDURE — 87389 HIV-1 AG W/HIV-1&-2 AB AG IA: CPT

## 2021-11-04 PROCEDURE — 93306 TTE W/DOPPLER COMPLETE: CPT

## 2021-11-04 PROCEDURE — 77010033678 HC OXYGEN DAILY

## 2021-11-04 PROCEDURE — 80074 ACUTE HEPATITIS PANEL: CPT

## 2021-11-04 PROCEDURE — 85379 FIBRIN DEGRADATION QUANT: CPT

## 2021-11-04 PROCEDURE — 85025 COMPLETE CBC W/AUTO DIFF WBC: CPT

## 2021-11-04 PROCEDURE — 80053 COMPREHEN METABOLIC PANEL: CPT

## 2021-11-04 PROCEDURE — 74011250637 HC RX REV CODE- 250/637: Performed by: HOSPITALIST

## 2021-11-04 PROCEDURE — 36415 COLL VENOUS BLD VENIPUNCTURE: CPT

## 2021-11-04 PROCEDURE — 74011250637 HC RX REV CODE- 250/637: Performed by: INTERNAL MEDICINE

## 2021-11-04 PROCEDURE — 74011000258 HC RX REV CODE- 258: Performed by: INTERNAL MEDICINE

## 2021-11-04 PROCEDURE — 82553 CREATINE MB FRACTION: CPT

## 2021-11-04 RX ORDER — LOPERAMIDE HYDROCHLORIDE 2 MG/1
4 CAPSULE ORAL
Status: DISCONTINUED | OUTPATIENT
Start: 2021-11-04 | End: 2021-11-18 | Stop reason: HOSPADM

## 2021-11-04 RX ORDER — SODIUM CHLORIDE 9 MG/ML
100 INJECTION, SOLUTION INTRAVENOUS CONTINUOUS
Status: DISPENSED | OUTPATIENT
Start: 2021-11-04 | End: 2021-11-05

## 2021-11-04 RX ORDER — MAGNESIUM SULFATE HEPTAHYDRATE 40 MG/ML
2 INJECTION, SOLUTION INTRAVENOUS ONCE
Status: COMPLETED | OUTPATIENT
Start: 2021-11-04 | End: 2021-11-04

## 2021-11-04 RX ORDER — DEXAMETHASONE SODIUM PHOSPHATE 4 MG/ML
10 INJECTION, SOLUTION INTRA-ARTICULAR; INTRALESIONAL; INTRAMUSCULAR; INTRAVENOUS; SOFT TISSUE EVERY 24 HOURS
Status: DISCONTINUED | OUTPATIENT
Start: 2021-11-04 | End: 2021-11-15 | Stop reason: ALTCHOICE

## 2021-11-04 RX ADMIN — Medication 10 MG: at 20:28

## 2021-11-04 RX ADMIN — ZINC SULFATE 220 MG (50 MG) CAPSULE 1 CAPSULE: CAPSULE at 09:07

## 2021-11-04 RX ADMIN — INSULIN LISPRO 2 UNITS: 100 INJECTION, SOLUTION INTRAVENOUS; SUBCUTANEOUS at 20:28

## 2021-11-04 RX ADMIN — Medication 500 MG: at 01:00

## 2021-11-04 RX ADMIN — MAGNESIUM SULFATE HEPTAHYDRATE 2 G: 40 INJECTION, SOLUTION INTRAVENOUS at 06:00

## 2021-11-04 RX ADMIN — Medication 500 MG: at 20:28

## 2021-11-04 RX ADMIN — SODIUM CHLORIDE 100 ML/HR: 900 INJECTION, SOLUTION INTRAVENOUS at 11:11

## 2021-11-04 RX ADMIN — DEXAMETHASONE SODIUM PHOSPHATE 10 MG: 4 INJECTION, SOLUTION INTRAMUSCULAR; INTRAVENOUS at 20:28

## 2021-11-04 RX ADMIN — Medication 500 MG: at 09:07

## 2021-11-04 RX ADMIN — ENOXAPARIN SODIUM 30 MG: 30 INJECTION SUBCUTANEOUS at 20:28

## 2021-11-04 RX ADMIN — Medication 2000 UNITS: at 09:07

## 2021-11-04 RX ADMIN — ACETAMINOPHEN 650 MG: 325 TABLET ORAL at 14:30

## 2021-11-04 RX ADMIN — PANTOPRAZOLE SODIUM 40 MG: 40 INJECTION, POWDER, FOR SOLUTION INTRAVENOUS at 09:06

## 2021-11-04 RX ADMIN — Medication 10 ML: at 06:39

## 2021-11-04 RX ADMIN — ENOXAPARIN SODIUM 30 MG: 30 INJECTION SUBCUTANEOUS at 09:06

## 2021-11-04 RX ADMIN — REMDESIVIR 200 MG: 100 INJECTION, POWDER, LYOPHILIZED, FOR SOLUTION INTRAVENOUS at 02:00

## 2021-11-04 RX ADMIN — Medication 10 MG: at 01:00

## 2021-11-04 RX ADMIN — LOPERAMIDE HYDROCHLORIDE 4 MG: 2 CAPSULE ORAL at 12:46

## 2021-11-04 RX ADMIN — POTASSIUM CHLORIDE 40 MEQ: 20 TABLET, EXTENDED RELEASE ORAL at 02:00

## 2021-11-04 RX ADMIN — Medication 10 ML: at 14:00

## 2021-11-04 NOTE — PROGRESS NOTES
Hospitalist Progress Note    Patient: Aminata Currie MRN: [id number]  CSN: [id number]    YOB: 1950  Age: 70 y.o. Sex: female    DOA: 11/3/2021 LOS:  LOS: 1 day          Chief Complaint:    Covid, SOB      Assessment/Plan     Pneumonia due to Covid with hypoxia, fever, SOB    duplex of lower extremity pending  lovenox BID for DVT prev  IV Decadron   5 day course of remdesivir  ID consult  02 support     Hyperglycemia  SSI PRN    Hypertension  Resume home medicines when she can tolerate  hydralazine as needed     Mild increase in liver enzymes, f/u US    Diarrhea-imodium PRN    Elevated CK, mild rhabdo, start IVF NS 24 hrs, repeat CK in am    Hypokalemia-resolved    02 sats look 96% on minimal 02 support    Disposition :  Patient Active Problem List   Diagnosis Code    [facility] to [facility] ([facility] U07.1, J96.00    Pneumonia due to COVID-19 virus U07.1, J12.82    HTN (hypertension) I10    Diabetes mellitus type 2, controlled ([facility] E11.9       Subjective:  Loose stools  On toilet for exam  Seems weak  But sats on low dose 02 96%, no resp issues seen on exam  Walked to toilet herself      Review of systems:    Constitutional: denies fevers  Respiratory: denies SOB, cough  Cardiovascular: denies chest pain  Gastrointestinal: denies nausea, vomiting      Vital signs/Intake and Output:  Visit Vitals  BP (!) 126/57   Pulse 90   Temp (!) 100.6 °F (38.1 °C)   Resp 20   Wt 81.2 kg (179 lb)   SpO2 99%     Current Shift:  No intake/output data recorded. Last three shifts:  11/02 1901 - 11/04 0700  In: 240 [P.O.:240]  Out: 0     Exam:    General: obese [place] female, NAD, does not want to engage with  phone as on toilet with diarrhea  Asks me \"is it ok? \" when I checked her 02 sat  Head/Neck: NCAT, supple, No masses, No lymphadenopathy  CVS:Regular rate and rhythm, no M/R/G, S1/S2 heard, no thrill  Lungs:Clear to auscultation bilaterally, no wheezes, rhonchi, or rales  Abdomen: Soft, Nontender, No distention, Normal Bowel sounds  Extremities: No C/C/E, pulses palpable 2+  Neuro:grossly normal , follows commands                  Labs: Results:       Chemistry Recent Labs     11/04/21 0100 11/03/21  1746   * 122*    131*   K 3.6 3.3*    96*   CO2 26 30   BUN 11 15   CREA 0.59* 0.84   CA 7.9* 8.4*   AGAP 6 5   BUCR 19 18   AP 89 101   TP 6.0* 7.3   ALB 2.3* 3.0*   GLOB 3.7 4.3*   AGRAT 0.6* 0.7*      CBC w/Diff Recent Labs     11/04/21 0100 11/03/21  1746   WBC 6.3 6.7   RBC 3.90* 4.18*   HGB 10.5* 11.0*   HCT 33.4* 35.8    175   GRANS 71 69   LYMPH 24 24   EOS 0 0      Cardiac Enzymes Recent Labs     11/04/21 0100 11/03/21  1746   CPK 2,697* 2,873*   CKND1 0.2  --       Coagulation No results for input(s): PTP, INR, APTT, INREXT in the last 72 hours. Lipid Panel No results found for: CHOL, CHOLPOCT, CHOLX, CHLST, CHOLV, 547609, HDL, HDLP, LDL, LDLC, DLDLP, 119944, VLDLC, VLDL, TGLX, TRIGL, TRIGP, TGLPOCT, CHHD, CHHDX   BNP No results for input(s): BNPP in the last 72 hours.    Liver Enzymes Recent Labs     11/04/21 0100   TP 6.0*   ALB 2.3*   AP 89      Thyroid Studies No results found for: T4, T3U, TSH, TSHEXT     Procedures/imaging: see electronic medical records for all procedures/Xrays and details which were not copied into this note but were reviewed prior to creation of Joselo Burrows MD

## 2021-11-04 NOTE — PROGRESS NOTES
Reason for Admission:  Chart reviewed; per H&P, patient is a \"76 y.o.  female from [facility] non-English speaking who has history of hypertension, hyperlipidemia and diabetes presents to the emergency room with worsening cough shortness of breath and fever for the last 4 days she has several grandchildren and children were sick with Covid. She is unvaccinated. In the emergency room she was found to have sats of 88% which improved with 5 L of oxygen. \"                     RUR Score:          Low, 10%           Plan for utilizing home health:   TBD       PCP: First and Last name:  Other, MD Hiram     Name of Practice:    Are you a current patient: Yes/No:    Approximate date of last visit:    Can you participate in a virtual visit with your PCP:                     Current Advanced Directive/Advance Care Plan: Full Code      Healthcare Decision Maker:   Click here to complete [place] including selection of the Healthcare Decision Maker Relationship (ie \"Primary\")         cookie aHgen - Primary - [phone]                    Transition of Care Plan:        Care manager contacted patient's son, Fransisco Sancehz - per son she lives with him and has Dr. Sharan Carvalho as PCP - last saw yesterday afternoon which prompted visit to ED and admission. He states she uses no DME and has been independent. Care manager will follow for further discharge needs. Care Management Interventions  PCP Verified by CM:  Yes  Last Visit to PCP: 11/03/21  Mode of Transport at Discharge: Self  Transition of Care Consult (CM Consult): Discharge Planning  Support Systems: Child(ama)  Confirm Follow Up Transport: Family  The Plan for Transition of Care is Related to the Following Treatment Goals : covid 19 pneumonia  The Patient and/or Patient Representative was Provided with a Choice of Provider and Agrees with the Discharge Plan?: Yes  Name of the Patient Representative Who was Provided with a Choice of Provider and Agrees with the Discharge Plan: Saumya Little, cookie  Discharge Location  Discharge Placement: Home with family assistance

## 2021-11-04 NOTE — PROGRESS NOTES
Problem: Risk for Spread of Infection  Goal: Prevent transmission of infectious organism to others  Description: Prevent the transmission of infectious organisms to other patients, staff members, and visitors. Outcome: Progressing Towards Goal     Problem: Patient Education:  Go to Education Activity  Goal: Patient/Family Education  Outcome: Progressing Towards Goal     Problem: Airway Clearance - Ineffective  Goal: Achieve or maintain patent airway  Outcome: Progressing Towards Goal     Problem: Gas Exchange - Impaired  Goal: Absence of hypoxia  Outcome: Progressing Towards Goal  Goal: Promote optimal lung function  Outcome: Progressing Towards Goal     Problem: Breathing Pattern - Ineffective  Goal: Ability to achieve and maintain a regular respiratory rate  Outcome: Progressing Towards Goal     Problem:  Body Temperature -  Risk of, Imbalanced  Goal: Ability to maintain a body temperature within defined limits  Outcome: Progressing Towards Goal  Goal: Will regain or maintain usual level of consciousness  Outcome: Progressing Towards Goal  Goal: Complications related to the disease process, condition or treatment will be avoided or minimized  Outcome: Progressing Towards Goal     Problem: Isolation Precautions - Risk of Spread of Infection  Goal: Prevent transmission of infectious organism to others  Outcome: Progressing Towards Goal     Problem: Nutrition Deficits  Goal: Optimize nutrtional status  Outcome: Progressing Towards Goal     Problem: Risk for Fluid Volume Deficit  Goal: Maintain normal heart rhythm  Outcome: Progressing Towards Goal  Goal: Maintain absence of muscle cramping  Outcome: Progressing Towards Goal  Goal: Maintain normal serum potassium, sodium, calcium, phosphorus, and pH  Outcome: Progressing Towards Goal     Problem: Loneliness or Risk for Loneliness  Goal: Demonstrate positive use of time alone when socialization is not possible  Outcome: Progressing Towards Goal     Problem: Fatigue  Goal: Verbalize increase energy and improved vitality  Outcome: Progressing Towards Goal     Problem: Patient Education: Go to Patient Education Activity  Goal: Patient/Family Education  Outcome: Progressing Towards Goal     Problem: Falls - Risk of  Goal: *Absence of Falls  Description: Document [place] Fall Risk and appropriate interventions in the flowsheet.   Outcome: Progressing Towards Goal  Variance Patient Condition  Note: Fall Risk Interventions:  Mobility Interventions: Patient to call before getting OOB         Medication Interventions: Bed/chair exit alarm    Elimination Interventions: Call light in reach              Problem: Patient Education: Go to Patient Education Activity  Goal: Patient/Family Education  Outcome: Progressing Towards Goal

## 2021-11-04 NOTE — WOUND CARE
Wound Care Note:    Wound Care into see patient because of Prevalence    Skin Care & Pressure Relief Recommendations:  Minimize layers of linen  Pads under patient to optimize support surface and microclimate  Turn/reposition approximately every 2 hours. Pillow Wedges  Manage incontinence  Promote continence; Skin Protective lotion to buttocks and sacrum daily and as needed with incontinence care    Offload heels with pillows or offloading boots.     Discussed above plan with patient     Transition of Care: Consult wound care if needed

## 2021-11-04 NOTE — PROGRESS NOTES
0700  Received bedside report from Brynn Glover, [address] [facility]. Report included KARDEX, SBAR, med rec, and labs    [place] [address] Dr. Trinity Snyder RT Pt's diarrhea and low BP. She ordered a 1Lbolus of NS. Pt was given meds for diarrhea. 1925  Bedside report given to Abad Fletcher RN.

## 2021-11-04 NOTE — PROGRESS NOTES
Requested by Dr. Trinity Colindres to review chart remotely    70year-old [place] female with HTN, HLD, DM, admitted to [facility] 11/3/2021 due to shortness of breath. Unvaccinated for Covid, she presents with 4 days of progressing cough and shortness of breath after exposure to several Covid positive family members. Hypoxic on arrival at ED with room air oxygen saturation of 88%, CTA chest showed bilateral groundglass infiltrates. Current SpO2 on 4 lmp NC is 96-99%. Dexamethasone and remdesivir have been started. Covid pneumonia with acute hypoxic respiratory failure - agree with dexamethasone, remdesivir. If oxygenation deteriorates and requires high flow nasal cannula or BIPA would consider tocilizumab or baricitinib. Dr. Bambi Benjamin will see patient when she returns tomorrow 11/5. Please call me at [phone] for any questions.     Toya Dunlap MD, [facility]  11/4/2021  8:51 AM

## 2021-11-04 NOTE — ED NOTES
TRANSFER - OUT REPORT:    Verbal report given to Myron Guy RN (name) on Polina Flores  being transferred to [facility] (unit) for routine progression of care       Report consisted of patients Situation, Background, Assessment and   Recommendations(SBAR). Information from the following report(s) SBAR, ED Summary, [facility] and Cardiac Rhythm NSR was reviewed with the receiving nurse. Lines:   Peripheral IV 11/03/21 Left Forearm (Active)   Site Assessment Clean, dry, & intact 11/03/21 1746   Phlebitis Assessment 0 11/03/21 1746   Infiltration Assessment 0 11/03/21 1746   Dressing Status Clean, dry, & intact; Occlusive 11/03/21 1746   Dressing Type Transparent 11/03/21 1746   Hub Color/Line Status Pink 11/03/21 1746   Action Taken Blood drawn 11/03/21 1746   Alcohol Cap Used Yes 11/03/21 1746       Peripheral IV 11/03/21 Right Antecubital (Active)   Site Assessment Clean, dry, & intact 11/03/21 1815   Phlebitis Assessment 0 11/03/21 1815   Infiltration Assessment 0 11/03/21 1815   Dressing Status Clean, dry, & intact; Occlusive 11/03/21 1815   Dressing Type Transparent 11/03/21 1815   Hub Color/Line Status Pink 11/03/21 1815   Action Taken Blood drawn 11/03/21 1815        Opportunity for questions and clarification was provided. Patient transported with:   Monitor  O2 @ 4 liters  Registered Nurse     Pt to CT then to the floor, AOx4, [place] is primary language, NS bolus still infusing.

## 2021-11-05 LAB
ALBUMIN SERPL-MCNC: 2 G/DL (ref 3.4–5)
ALBUMIN/GLOB SERPL: 0.5 {RATIO} (ref 0.8–1.7)
ALP SERPL-CCNC: 87 U/L (ref 45–117)
ALT SERPL-CCNC: 53 U/L (ref 13–56)
ANION GAP SERPL CALC-SCNC: 4 MMOL/L (ref 3–18)
AST SERPL-CCNC: 136 U/L (ref 10–38)
BACTERIA SPEC CULT: ABNORMAL
BASOPHILS # BLD: 0 K/UL (ref 0–0.1)
BASOPHILS NFR BLD: 0 % (ref 0–2)
BILIRUB SERPL-MCNC: 0.9 MG/DL (ref 0.2–1)
BUN SERPL-MCNC: 14 MG/DL (ref 7–18)
BUN/CREAT SERPL: 23 (ref 12–20)
CALCIUM SERPL-MCNC: 7.7 MG/DL (ref 8.5–10.1)
CHLORIDE SERPL-SCNC: 106 MMOL/L (ref 100–111)
CK SERPL-CCNC: 2179 U/L (ref 26–192)
CO2 SERPL-SCNC: 27 MMOL/L (ref 21–32)
CREAT SERPL-MCNC: 0.61 MG/DL (ref 0.6–1.3)
CRP SERPL-MCNC: 14.5 MG/DL (ref 0–0.3)
D DIMER PPP FEU-MCNC: 3.46 UG/ML(FEU)
DIFFERENTIAL METHOD BLD: ABNORMAL
EOSINOPHIL # BLD: 0 K/UL (ref 0–0.4)
EOSINOPHIL NFR BLD: 0 % (ref 0–5)
ERYTHROCYTE [DISTWIDTH] IN BLOOD BY AUTOMATED COUNT: 13.2 % (ref 11.6–14.5)
GLOBULIN SER CALC-MCNC: 3.8 G/DL (ref 2–4)
GLUCOSE BLD STRIP.AUTO-MCNC: 121 MG/DL (ref 70–110)
GLUCOSE BLD STRIP.AUTO-MCNC: 192 MG/DL (ref 70–110)
GLUCOSE BLD STRIP.AUTO-MCNC: 199 MG/DL (ref 70–110)
GLUCOSE BLD STRIP.AUTO-MCNC: 200 MG/DL (ref 70–110)
GLUCOSE SERPL-MCNC: 175 MG/DL (ref 74–99)
GRAM STN SPEC: ABNORMAL
HCT VFR BLD AUTO: 32 % (ref 35–45)
HGB BLD-MCNC: 9.6 G/DL (ref 12–16)
HIV 1+2 AB+HIV1 P24 AG SERPL QL IA: NONREACTIVE
HIV12 RESULT COMMENT, HHIVC: NORMAL
LYMPHOCYTES # BLD: 0.7 K/UL (ref 0.9–3.6)
LYMPHOCYTES NFR BLD: 11 % (ref 21–52)
MAGNESIUM SERPL-MCNC: 2.2 MG/DL (ref 1.6–2.6)
MCH RBC QN AUTO: 25.8 PG (ref 24–34)
MCHC RBC AUTO-ENTMCNC: 30 G/DL (ref 31–37)
MCV RBC AUTO: 86 FL (ref 78–100)
MONOCYTES # BLD: 0.3 K/UL (ref 0.05–1.2)
MONOCYTES NFR BLD: 5 % (ref 3–10)
NEUTS SEG # BLD: 5.6 K/UL (ref 1.8–8)
NEUTS SEG NFR BLD: 84 % (ref 40–73)
PLATELET # BLD AUTO: 210 K/UL (ref 135–420)
PMV BLD AUTO: 10.8 FL (ref 9.2–11.8)
POTASSIUM SERPL-SCNC: 4.1 MMOL/L (ref 3.5–5.5)
PROT SERPL-MCNC: 5.8 G/DL (ref 6.4–8.2)
RBC # BLD AUTO: 3.72 M/UL (ref 4.2–5.3)
RBC MORPH BLD: ABNORMAL
SERVICE CMNT-IMP: ABNORMAL
SODIUM SERPL-SCNC: 137 MMOL/L (ref 136–145)
WBC # BLD AUTO: 6.6 K/UL (ref 4.6–13.2)

## 2021-11-05 PROCEDURE — 83735 ASSAY OF MAGNESIUM: CPT

## 2021-11-05 PROCEDURE — 86140 C-REACTIVE PROTEIN: CPT

## 2021-11-05 PROCEDURE — 85379 FIBRIN DEGRADATION QUANT: CPT

## 2021-11-05 PROCEDURE — 36415 COLL VENOUS BLD VENIPUNCTURE: CPT

## 2021-11-05 PROCEDURE — 74011250637 HC RX REV CODE- 250/637: Performed by: INTERNAL MEDICINE

## 2021-11-05 PROCEDURE — 82550 ASSAY OF CK (CPK): CPT

## 2021-11-05 PROCEDURE — C9113 INJ PANTOPRAZOLE SODIUM, VIA: HCPCS | Performed by: INTERNAL MEDICINE

## 2021-11-05 PROCEDURE — 74011636637 HC RX REV CODE- 636/637: Performed by: INTERNAL MEDICINE

## 2021-11-05 PROCEDURE — 82962 GLUCOSE BLOOD TEST: CPT

## 2021-11-05 PROCEDURE — 74011250636 HC RX REV CODE- 250/636: Performed by: INTERNAL MEDICINE

## 2021-11-05 PROCEDURE — 80053 COMPREHEN METABOLIC PANEL: CPT

## 2021-11-05 PROCEDURE — 74011000250 HC RX REV CODE- 250: Performed by: INTERNAL MEDICINE

## 2021-11-05 PROCEDURE — 65660000000 HC RM CCU STEPDOWN

## 2021-11-05 PROCEDURE — 74011250636 HC RX REV CODE- 250/636: Performed by: HOSPITALIST

## 2021-11-05 PROCEDURE — 85025 COMPLETE CBC W/AUTO DIFF WBC: CPT

## 2021-11-05 PROCEDURE — 74011000258 HC RX REV CODE- 258: Performed by: INTERNAL MEDICINE

## 2021-11-05 RX ADMIN — Medication 500 MG: at 08:48

## 2021-11-05 RX ADMIN — ZINC SULFATE 220 MG (50 MG) CAPSULE 1 CAPSULE: CAPSULE at 08:48

## 2021-11-05 RX ADMIN — INSULIN LISPRO 2 UNITS: 100 INJECTION, SOLUTION INTRAVENOUS; SUBCUTANEOUS at 06:19

## 2021-11-05 RX ADMIN — Medication 10 MG: at 22:17

## 2021-11-05 RX ADMIN — ACETAMINOPHEN 650 MG: 325 TABLET ORAL at 12:27

## 2021-11-05 RX ADMIN — INSULIN LISPRO 2 UNITS: 100 INJECTION, SOLUTION INTRAVENOUS; SUBCUTANEOUS at 22:17

## 2021-11-05 RX ADMIN — Medication 500 MG: at 22:17

## 2021-11-05 RX ADMIN — Medication 10 ML: at 14:00

## 2021-11-05 RX ADMIN — Medication 2000 UNITS: at 08:48

## 2021-11-05 RX ADMIN — PANTOPRAZOLE SODIUM 40 MG: 40 INJECTION, POWDER, FOR SOLUTION INTRAVENOUS at 08:48

## 2021-11-05 RX ADMIN — ENOXAPARIN SODIUM 30 MG: 30 INJECTION SUBCUTANEOUS at 08:48

## 2021-11-05 RX ADMIN — INSULIN LISPRO 4 UNITS: 100 INJECTION, SOLUTION INTRAVENOUS; SUBCUTANEOUS at 12:27

## 2021-11-05 RX ADMIN — DEXAMETHASONE SODIUM PHOSPHATE 10 MG: 4 INJECTION, SOLUTION INTRAMUSCULAR; INTRAVENOUS at 22:17

## 2021-11-05 RX ADMIN — REMDESIVIR 100 MG: 100 INJECTION, POWDER, LYOPHILIZED, FOR SOLUTION INTRAVENOUS at 00:54

## 2021-11-05 RX ADMIN — ENOXAPARIN SODIUM 30 MG: 30 INJECTION SUBCUTANEOUS at 22:18

## 2021-11-05 NOTE — PROGRESS NOTES
DW pharmacist  [address] [employer] final ID on [id number] Erica [address]ingham available and it is CoNS    Bacteremia with CoNS is contamination. No need for Vanco or repeat blood culture    Enedina France MD  [facility])  Office #:     [phone]-ADOMCV #[phone]   Office Fax: [phone]

## 2021-11-05 NOTE — PROGRESS NOTES
2030 A  was used to educate the patient about all medications, tele monitor and calling for help. The patient reports she feels \"well\" and denied breathing difficulties or any needs at this time. No needs identified while in the room. The patient appears to be resting comfortably. No signs of SOB or discomfort of any kind. 0700 Shift Summary: Pt rested well overnight with no complaints. No new clinical concerns noted. The  was used as needed.      Nightshift Chart Audit Completed

## 2021-11-05 NOTE — DIABETES MGMT
Diabetes/ Glycemic Control Plan of Care  Recommendations:   Recommend to initiate low-dose of Lantus 5 units q 24 hours    Assessment: Patient with history of diabetes, no diabetes home medications, admitted with COVID. HgbA1c appropriate for age. Steroid induced hyperglycemia as blood glucose levels are outside of targeted range. Fasting glucose this morning 199 mg/dL. DX:   1. Pneumonia due to COVID-19 virus     2. Acute respiratory failure with hypoxia (HCC)     3. Essential hypertension     4. Hypokalemia     5. SOB (shortness of breath)  ECHO ADULT COMPLETE        Steroids:   Rx Glucocorticoids (24h ago, onward)             Start     Dose Route Frequency Ordered Stop    11/04/21 2100  dexamethasone (DECADRON) 4 mg/mL injection 10 mg         10 mg IV EVERY 24 HOURS 11/04/21 0850 --               Recent Glucose Results:   Lab Results   Component Value Date/Time     (H) 11/05/2021 03:27 AM    GLUCPOC 200 (H) 11/05/2021 12:18 PM    GLUCPOC 199 (H) 11/05/2021 06:18 AM    GLUCPOC 176 (H) 11/04/2021 08:27 PM           Within target range (70-180mg/dL): No  Current insulin orders: correctional lispro - normal sensitivity  Total Daily Dose previous 24 hours = 8 units corrective lispro  Current A1c:   Lab Results   Component Value Date/Time    Hemoglobin A1c 6.0 (H) 11/03/2021 05:46 PM      equivalent  to ave Blood Glucose of 126 mg/dl for 2-3 months prior to admission  Adequate glycemic control PTA: Yes  Nutrition/Diet:   Active Orders   Diet    ADULT DIET Regular; 5 carb choices (75 gm/meal)        Home diabetes medications:   Key Antihyperglycemic Medications     Patient is on no antihyperglycemic meds. Plan/Goals:   Blood glucose will be within target of 70 - 180 mg/dl within 72 hours  Reinforce dietary and medication compliance at home.           Education:  [] Refer to Diabetes Education Record                       [x] Education not indicated at this time     Dianah Gitelman, RD  Glycemic Control Team  [phone]

## 2021-11-05 NOTE — CONSULTS
[facility]  (A Division of [address] [place] [address])                                                                                                                    Yvonne Grajeda MD  Office #: [phone] Option # [phone]  Fax #: [phone]     Date of Admission: 11/3/2021Date of Note: 11/5/2021  Reason for Consult: Evaluation and antibiotic management of CPVID 19 infection    Thank you for involving me in the care of this patient. Please do not hesitate to contact me on the above number if question or concern. ( prelim eval done by Dr Carly Farias yesterday while I was away in Chart review)  I will be covering over weekend. . Please call for via  or [employer] for questions/consults. Thanks. Current Antimicrobials:    Prior Antimicrobials:    Remdesivir 11/3 to date  Dexamethasone 11/3 to date     NA       Assessment- ID related:  --------------------------------------------------------------------------  · COVId 19 infection  · Viral PNA- Bilateral- Patchy infiltrate by CXR and B/L multilobular GGO on CT  · Acute hypoxic respiratory failure PaO2 50 on ABG  · Rhabdo 2/2 viral infection with transaminitis  · GPC bacteremia- May or may not be contaminant  COVId rapid + 11/3  Influenza A antigen : negative  DD elevated to 7.84  CRP 12.7-> 14.5  Ferretin 80489    HIV 1/2- negative  Hepatitis C ab/S antigen -negative. Other Medical Issues- Mx per respective team:  · HTN  · Hyperlipidemia  · DM     Recommendation for ID issues I am following:  --------------------------------------------------------------------------  --agree with dexamethasone/ remdesivir. Suspect rhabdo contributing more to abn LFT. Monitor LFT and if jumbs >10X, need to DC remdesivir  -monitor biomarkers. Check TB serology  Cont vitamin cocktail and weaning off oxygen per primary team    -Start Vanco, pharmacy to dose.  No need to repeat blood culture unless it is S.aurues.  -If CoNS identifed, will DC vanco    NB: Mary Perez called for further [facility] of ID. They don't have the sample there yet       HPI:  Camilo Davenport is a 70 y.o. BLACK/ who is unvaccinated with PMH as listed below with limited communication due to [address] barrier. Admitted on 11/3 with hypoxia/symptoms of SOB and fever and body aches X4 days. Was hypoxic to 86% on RA-- apparently with muliplte family members sick with COVID 19 too. Found with fever, B/L lung infiltrate on CXR and CTA chest without PE, elevared D-dimer, high ferretin and CRP. Oxygen demand has been 4litres and is better down to 2 L at exam today. Currently on Remdesvir/dexamethasone. Stan Armas [address] from admission 1 set + for [address] [place]. Active Hospital Problems    Diagnosis Date Noted    Acute respiratory failure due to COVID-19 [facility]) 11/03/2021    Pneumonia due to COVID-19 virus 11/03/2021    HTN (hypertension) 11/03/2021    Diabetes mellitus type 2, controlled ([facility] 11/03/2021     Past Medical History:   Diagnosis Date    Diabetes ([facility]     HTN (hypertension)     Hyperlipidemia      History reviewed. No pertinent surgical history. Family History   Problem Relation Age of Onset    Hypertension Mother      Social History     Socioeconomic History    Marital status:      Spouse name: Not on file    Number of children: Not on file    Years of education: Not on file    Highest education level: Not on file   Occupational History    Not on file   Tobacco Use    Smoking status: Never Smoker    Smokeless tobacco: Never Used   Substance and Sexual Activity    Alcohol use:  Yes    Drug use: Never    Sexual activity: Not Currently   Other Topics Concern    Not on file   Social History Narrative    Not on file     Social Determinants of Health     Financial Resource Strain:     Difficulty of Paying Living Expenses: Not on file   Food Insecurity:     Worried About Running Out of Food in the Last Year: Not on file    [place] Food in the Last Year: Not on file   Transportation Needs:     Lack of Transportation (Medical): Not on file    Lack of Transportation (Non-Medical): Not on file   Physical Activity:     Days of Exercise per Week: Not on file    Minutes of Exercise per Session: Not on file   Stress:     Feeling of Stress : Not on file   Social Connections:     Frequency of Communication with Friends and Family: Not on file    Frequency of Social Gatherings with Friends and Family: Not on file    Attends Baptism Services: Not on file    Active Member of [address] [place] [address] Reaction or Organizations: Not on file    Attends Club or Organization Meetings: Not on file    Marital Status: Not on file   Intimate Partner Violence:     Fear of Current or Ex-Partner: Not on file    Emotionally Abused: Not on file    Physically Abused: Not on file    Sexually Abused: Not on file   Housing Stability:     Unable to Pay for Housing in the Last Year: Not on file    Number of [place] in the Last Year: Not on file    Unstable Housing in the Last Year: Not on file       Allergies:  Patient has no known allergies.      Medications:  Current Facility-Administered Medications   Medication Dose Route Frequency    remdesivir 100 mg in 0.9% sodium chloride 250 mL IVPB  100 mg IntraVENous Q24H    dexamethasone (DECADRON) 4 mg/mL injection 10 mg  10 mg IntraVENous Q24H    loperamide (IMODIUM) capsule 4 mg  4 mg Oral Q4H PRN    sodium chloride (NS) flush 5-10 mL  5-10 mL IntraVENous PRN    enoxaparin (LOVENOX) injection 30 mg  30 mg SubCUTAneous Q12H    acetaminophen (TYLENOL) tablet 650 mg  650 mg Oral Q6H PRN    Or    acetaminophen (TYLENOL) suppository 650 mg  650 mg Rectal Q6H PRN    guaiFENesin-dextromethorphan (ROBITUSSIN DM) 100-10 mg/5 mL syrup 5 mL  5 mL Oral Q4H PRN    cholecalciferol (VITAMIN D3) (1000 Units /25 mcg) tablet 2,000 Units  2,000 Units Oral DAILY    insulin lispro (HUMALOG) injection   SubCUTAneous AC&HS    glucose chewable tablet 16 g  4 Tablet Oral PRN    glucagon (GLUCAGEN) injection 1 mg  1 mg IntraMUSCular PRN    dextrose (D50W) injection syrg 12.5-25 g  25-50 mL IntraVENous PRN    sodium chloride (NS) flush 5-40 mL  5-40 mL IntraVENous Q8H    sodium chloride (NS) flush 5-40 mL  5-40 mL IntraVENous PRN    polyethylene glycol (MIRALAX) packet 17 g  17 g Oral DAILY PRN    ondansetron (ZOFRAN ODT) tablet 4 mg  4 mg Oral Q8H PRN    Or    ondansetron (ZOFRAN) injection 4 mg  4 mg IntraVENous Q6H PRN    zinc sulfate (ZINCATE) 50 mg zinc (220 mg) capsule 1 Capsule  1 Capsule Oral DAILY    ascorbic acid (vitamin C) (VITAMIN C) tablet 500 mg  500 mg Oral BID    melatonin (rapid dissolve) tablet 10 mg  10 mg Oral QHS    pantoprazole (PROTONIX) injection 40 mg  40 mg IntraVENous DAILY    hydrALAZINE (APRESOLINE) 20 mg/mL injection 10 mg  10 mg IntraVENous Q6H PRN        ROS:  Review of systems not obtained due to patient factors. Physical Exam:    Temp (24hrs), Av.5 °F (37.5 °C), Min:98.1 °F (36.7 °C), Max:100.6 °F (38.1 °C)    Visit Vitals  BP (!) 109/58   Pulse 81   Temp 98.1 °F (36.7 °C)   Resp 20   Wt 81.2 kg (179 lb)   SpO2 95%          GEN: WDWN, not in resp distress. On 2L oxygen via NC  HEENT: Unicteric. EOMI intact  CHEST: Non laboured breathing. CTA  CVS:RRR, no mur/gallop  ABD: Obese/soft. Non tender. PEPE: Deferred  EXT: No apparent swelling or redness on UE/LE joints. Skin: Dry and intact. No rash, no redness. CNS: A, OX3. Moves all extremity. CN grossly ok. Microbiology  All Micro Results     Procedure Component Value Units Date/Time    CULTURE, BLOOD [[id number]] Collected: 21 1004    Order Status: Completed Specimen: Blood Updated: 21 1501     Special Requests: NO SPECIAL REQUESTS        GRAM STAIN       AEROBIC BOTTLE GRAM POSITIVE COCCI IN CLUSTERS                  SMEAR CALLED TO AND CORRECTLY REPEATED BY: Aida Mendoza RN 3N BY CAM ON 21 AT [address] [place] [address].                   GRAM POSITIVE COCCI IN CLUSTERS ANAEROBIC BOTTLE                  SMEAR CALLED TO AND CORRECTLY REPEATED BY: Ricco Adam RN 3N AT 1500 ON 11/04/2021 TO [facility]. Culture result:       CULTURE IN PROGRESS,FURTHER UPDATES TO FOLLOW Sent to [facility] for ID/Susceptibility if indicated. LEGIONELLA PNEUMOPHILA AG, URINE [[id number]]     Order Status: Sent Specimen: Urine     STREP Germanalee Dk, URINE [[id number]]     Order Status: Sent Specimen: Urine     CULTURE, RESPIRATORY/SPUTUM/BRONCH Dari Pellant [[id number]]     Order Status: Sent Specimen: Sputum     INFLUENZA A & B AG (RAPID TEST) [[id number]] Collected: 11/03/21 1813    Order Status: Completed Specimen: Nasopharyngeal from Nasal washing Updated: 11/03/21 1843     Influenza A Antigen Negative        Comment: A negative result does not exclude influenza virus infection, seasonal or H1N1 due to suboptimal sensitivity. If influenza is circulating in your community, a diagnosis of influenza should be considered based on a patients clinical presentation and empiric antiviral treatment should be considered, if indicated. Influenza B Antigen Negative       COVID-19 RAPID TEST [[id number]]  (Abnormal) Collected: 11/03/21 1813    Order Status: Completed Specimen: Nasopharyngeal Updated: 11/03/21 1840     Specimen source Nasopharyngeal        COVID-19 rapid test Detected        Comment:      The specimen is POSITIVE for SARS-CoV-2, the novel coronavirus associated with COVID-19. This test has been authorized by the FDA under an Emergency Use Authorization (EUA) for use by authorized laboratories. Fact sheet for Healthcare Providers: ConventionUpdate.co.nz  Fact sheet for Patients: ConventionUpdate.co.nz       Methodology: Isothermal Nucleic Acid Amplification  CALLED TO AND CORRECTLY REPEATED BY:  Baudilio TAY IN ER  [place] ON 11/03/2021 TO [facility].                   Lines / Catheters:  Lab results:    Chemistry  Recent Labs 11/05/21 0327 11/04/21  0100 11/03/21  1746   * 124* 122*    136 131*   K 4.1 3.6 3.3*    104 96*   CO2 27 26 30   BUN 14 11 15   CREA 0.61 0.59* 0.84   CA 7.7* 7.9* 8.4*   AGAP 4 6 5   BUCR 23* 19 18   AP 87 89 101   TP 5.8* 6.0* 7.3   ALB 2.0* 2.3* 3.0*   GLOB 3.8 3.7 4.3*   AGRAT 0.5* 0.6* 0.7*       CBC w/ Diff  Recent Labs     11/05/21 0327 11/04/21  0100 11/03/21  1746   WBC 6.6 6.3 6.7   RBC 3.72* 3.90* 4.18*   HGB 9.6* 10.5* 11.0*   HCT 32.0* 33.4* 35.8    169 175   GRANS 84* 71 69   LYMPH 11* 24 24   EOS 0 0 0       Imaging: report posted below as per radiologist - reports reviewed in EPIC

## 2021-11-05 NOTE — ROUTINE PROCESS
Bedside and Verbal shift change report given to Jeaneth Eden  (oncoming nurse) by Valentin Barahona RN  (offgoing nurse). Report given with SBAR, Kardex, Intake/Output and Recent Results.

## 2021-11-05 NOTE — PROGRESS NOTES
Hospitalist Progress Note    Patient: Doneta Dubin MRN: [id number]  CSN: [id number]    YOB: 1950  Age: 70 y.o. Sex: female    DOA: 11/3/2021 LOS:  LOS: 2 days          Chief Complaint:    covid pneumonia      Assessment/Plan        Pneumonia due to Covid with hypoxia, fever, SOB     duplex of lower extremity neg for DVT  lovenox BID for DVT prevention  IV Decadron daily  5 day course of remdesivir until 11/8  ID consult appreciated  02 support, wean as tolerated  Start PT     Hyperglycemia/diabetes  SSI PRN     Hypertension  hydralazine as needed     Mild increase in liver enzymes, US w/o overt abnl     Diarrhea-imodium PRN     Elevated CK, mild rhabdo, gave  IVF NS 24 hrs, repeat CK daily    Daily ferritin and fibrinogen as well as d dimer     Hypokalemia-resolved    Continue inpatient care    25 min in room trying to get Adv[employer]let  on translation ipad device provided by hospital, no success as all translators busy  Thru broken english we were able to assess she has no stomach pain, SOB, or pain    Chart, labs and meds all reviewed, and patient examined in PPE    Total time: 40 min  Counseling / coordination time:   > 50% counseling / coordination        Disposition :  Patient Active Problem List   Diagnosis Code    Acute respiratory failure due to COVID-19 ([facility] U07.1, J96.00    Pneumonia due to COVID-19 virus U07.1, J12.82    HTN (hypertension) I10    Diabetes mellitus type 2, controlled ([facility] [facility] E11.9       Subjective:    Denies abd pain  Denies SOB  Resting comfortably on back  Gets up to toilet on own  Wearing 2 liters NC 02, sats checked-100%    Review of systems:    as above      Vital signs/Intake and Output:  Visit Vitals  BP (!) 109/58   Pulse 81   Temp 98.1 °F (36.7 °C)   Resp 20   Wt 81.2 kg (179 lb)   SpO2 95%     Current Shift:  No intake/output data recorded.   Last three shifts:  11/03 1901 - 11/05 0700  In: 5335 [P.O.:960; I.V.:500]  Out: 0     Exam:    General: obese [place]hawn female,  alert, NAD  Head/Neck: NCAT, supple, No masses, No lymphadenopathy  CVS:Regular rate and rhythm, no M/R/G, S1/S2 heard, no thrill  Lungs:Clear to auscultation bilaterally, no wheezes, rhonchi, or rales  Abdomen: Soft, Nontender, No distention, Normal Bowel sounds  Extremities: No C/C/E, pulses palpable 2+  Neuro:grossly normal , follows commands  Psych:appropriate                Labs: Results:       Chemistry Recent Labs     11/05/21 0327 11/04/21 0100 11/03/21  1746   * 124* 122*    136 131*   K 4.1 3.6 3.3*    104 96*   CO2 27 26 30   BUN 14 11 15   CREA 0.61 0.59* 0.84   CA 7.7* 7.9* 8.4*   AGAP 4 6 5   BUCR 23* 19 18   AP 87 89 101   TP 5.8* 6.0* 7.3   ALB 2.0* 2.3* 3.0*   GLOB 3.8 3.7 4.3*   AGRAT 0.5* 0.6* 0.7*      CBC w/Diff Recent Labs     11/05/21 0327 11/04/21 0100 11/03/21  1746   WBC 6.6 6.3 6.7   RBC 3.72* 3.90* 4.18*   HGB 9.6* 10.5* 11.0*   HCT 32.0* 33.4* 35.8    169 175   GRANS 84* 71 69   LYMPH 11* 24 24   EOS 0 0 0      Cardiac Enzymes Recent Labs     11/05/21 0327 11/04/21  0850 11/04/21 0100 11/04/21  0100   CPK 2,179* 2,661*   < > 2,697*   CKND1  --  0.1  --  0.2    < > = values in this interval not displayed. Coagulation No results for input(s): PTP, INR, APTT, INREXT in the last 72 hours. Lipid Panel No results found for: CHOL, CHOLPOCT, CHOLX, CHLST, CHOLV, 899826, HDL, HDLP, LDL, LDLC, DLDLP, 240476, VLDLC, VLDL, TGLX, TRIGL, TRIGP, TGLPOCT, CHHD, CHHDX   BNP No results for input(s): BNPP in the last 72 hours.    Liver Enzymes Recent Labs     11/05/21  0327   TP 5.8*   ALB 2.0*   AP 87      Thyroid Studies No results found for: T4, T3U, TSH, TSHEXT     Procedures/imaging: see electronic medical records for all procedures/Xrays and details which were not copied into this note but were reviewed prior to creation of Ben Chand MD

## 2021-11-06 LAB
ALBUMIN SERPL-MCNC: 2 G/DL (ref 3.4–5)
ALBUMIN/GLOB SERPL: 0.5 {RATIO} (ref 0.8–1.7)
ALP SERPL-CCNC: 88 U/L (ref 45–117)
ALT SERPL-CCNC: 59 U/L (ref 13–56)
ANION GAP SERPL CALC-SCNC: 2 MMOL/L (ref 3–18)
AST SERPL-CCNC: 103 U/L (ref 10–38)
BASOPHILS # BLD: 0 K/UL (ref 0–0.1)
BASOPHILS NFR BLD: 0 % (ref 0–2)
BILIRUB SERPL-MCNC: 0.9 MG/DL (ref 0.2–1)
BUN SERPL-MCNC: 17 MG/DL (ref 7–18)
BUN/CREAT SERPL: 25 (ref 12–20)
CALCIUM SERPL-MCNC: 7.8 MG/DL (ref 8.5–10.1)
CHLORIDE SERPL-SCNC: 108 MMOL/L (ref 100–111)
CK SERPL-CCNC: 1206 U/L (ref 26–192)
CO2 SERPL-SCNC: 29 MMOL/L (ref 21–32)
CREAT SERPL-MCNC: 0.67 MG/DL (ref 0.6–1.3)
CRP SERPL-MCNC: 8.8 MG/DL (ref 0–0.3)
D DIMER PPP FEU-MCNC: 2.28 UG/ML(FEU)
DIFFERENTIAL METHOD BLD: ABNORMAL
EOSINOPHIL # BLD: 0 K/UL (ref 0–0.4)
EOSINOPHIL NFR BLD: 0 % (ref 0–5)
ERYTHROCYTE [DISTWIDTH] IN BLOOD BY AUTOMATED COUNT: 13 % (ref 11.6–14.5)
FERRITIN SERPL-MCNC: 3953 NG/ML (ref 8–388)
FIBRINOGEN PPP-MCNC: 468 MG/DL (ref 210–451)
GLOBULIN SER CALC-MCNC: 3.8 G/DL (ref 2–4)
GLUCOSE BLD STRIP.AUTO-MCNC: 122 MG/DL (ref 70–110)
GLUCOSE BLD STRIP.AUTO-MCNC: 136 MG/DL (ref 70–110)
GLUCOSE BLD STRIP.AUTO-MCNC: 149 MG/DL (ref 70–110)
GLUCOSE BLD STRIP.AUTO-MCNC: 178 MG/DL (ref 70–110)
GLUCOSE SERPL-MCNC: 140 MG/DL (ref 74–99)
HCT VFR BLD AUTO: 31.6 % (ref 35–45)
HGB BLD-MCNC: 9.8 G/DL (ref 12–16)
IL6 SERPL-MCNC: 169.3 PG/ML (ref 0–13)
LYMPHOCYTES # BLD: 1.3 K/UL (ref 0.9–3.6)
LYMPHOCYTES NFR BLD: 10 % (ref 21–52)
MCH RBC QN AUTO: 26.4 PG (ref 24–34)
MCHC RBC AUTO-ENTMCNC: 31 G/DL (ref 31–37)
MCV RBC AUTO: 85.2 FL (ref 78–100)
MONOCYTES # BLD: 0.9 K/UL (ref 0.05–1.2)
MONOCYTES NFR BLD: 8 % (ref 3–10)
NEUTS SEG # BLD: 9.8 K/UL (ref 1.8–8)
NEUTS SEG NFR BLD: 81 % (ref 40–73)
PLATELET # BLD AUTO: 273 K/UL (ref 135–420)
PMV BLD AUTO: 11.6 FL (ref 9.2–11.8)
POTASSIUM SERPL-SCNC: 4.2 MMOL/L (ref 3.5–5.5)
PROCALCITONIN SERPL-MCNC: 0.15 NG/ML
PROT SERPL-MCNC: 5.8 G/DL (ref 6.4–8.2)
RBC # BLD AUTO: 3.71 M/UL (ref 4.2–5.3)
SODIUM SERPL-SCNC: 139 MMOL/L (ref 136–145)
WBC # BLD AUTO: 12.1 K/UL (ref 4.6–13.2)

## 2021-11-06 PROCEDURE — 97162 PT EVAL MOD COMPLEX 30 MIN: CPT

## 2021-11-06 PROCEDURE — 97110 THERAPEUTIC EXERCISES: CPT

## 2021-11-06 PROCEDURE — 97530 THERAPEUTIC ACTIVITIES: CPT

## 2021-11-06 PROCEDURE — 80053 COMPREHEN METABOLIC PANEL: CPT

## 2021-11-06 PROCEDURE — 74011000250 HC RX REV CODE- 250: Performed by: INTERNAL MEDICINE

## 2021-11-06 PROCEDURE — 82962 GLUCOSE BLOOD TEST: CPT

## 2021-11-06 PROCEDURE — 84145 PROCALCITONIN (PCT): CPT

## 2021-11-06 PROCEDURE — 74011250636 HC RX REV CODE- 250/636: Performed by: HOSPITALIST

## 2021-11-06 PROCEDURE — 74011250636 HC RX REV CODE- 250/636: Performed by: INTERNAL MEDICINE

## 2021-11-06 PROCEDURE — 85384 FIBRINOGEN ACTIVITY: CPT

## 2021-11-06 PROCEDURE — C9113 INJ PANTOPRAZOLE SODIUM, VIA: HCPCS | Performed by: INTERNAL MEDICINE

## 2021-11-06 PROCEDURE — 74011636637 HC RX REV CODE- 636/637: Performed by: INTERNAL MEDICINE

## 2021-11-06 PROCEDURE — 77010033678 HC OXYGEN DAILY

## 2021-11-06 PROCEDURE — 74011250637 HC RX REV CODE- 250/637: Performed by: INTERNAL MEDICINE

## 2021-11-06 PROCEDURE — 85025 COMPLETE CBC W/AUTO DIFF WBC: CPT

## 2021-11-06 PROCEDURE — 86704 HEP B CORE ANTIBODY TOTAL: CPT

## 2021-11-06 PROCEDURE — 65660000000 HC RM CCU STEPDOWN

## 2021-11-06 PROCEDURE — 86480 TB TEST CELL IMMUN MEASURE: CPT

## 2021-11-06 PROCEDURE — 74011000258 HC RX REV CODE- 258: Performed by: INTERNAL MEDICINE

## 2021-11-06 PROCEDURE — 82550 ASSAY OF CK (CPK): CPT

## 2021-11-06 PROCEDURE — 86140 C-REACTIVE PROTEIN: CPT

## 2021-11-06 PROCEDURE — 82728 ASSAY OF FERRITIN: CPT

## 2021-11-06 PROCEDURE — 36415 COLL VENOUS BLD VENIPUNCTURE: CPT

## 2021-11-06 PROCEDURE — 85379 FIBRIN DEGRADATION QUANT: CPT

## 2021-11-06 RX ADMIN — ZINC SULFATE 220 MG (50 MG) CAPSULE 1 CAPSULE: CAPSULE at 08:13

## 2021-11-06 RX ADMIN — DEXAMETHASONE SODIUM PHOSPHATE 10 MG: 4 INJECTION, SOLUTION INTRAMUSCULAR; INTRAVENOUS at 21:23

## 2021-11-06 RX ADMIN — REMDESIVIR 100 MG: 100 INJECTION, POWDER, LYOPHILIZED, FOR SOLUTION INTRAVENOUS at 01:04

## 2021-11-06 RX ADMIN — Medication 500 MG: at 08:13

## 2021-11-06 RX ADMIN — Medication 10 ML: at 13:26

## 2021-11-06 RX ADMIN — INSULIN LISPRO 2 UNITS: 100 INJECTION, SOLUTION INTRAVENOUS; SUBCUTANEOUS at 12:32

## 2021-11-06 RX ADMIN — Medication 500 MG: at 21:22

## 2021-11-06 RX ADMIN — Medication 10 ML: at 22:16

## 2021-11-06 RX ADMIN — PANTOPRAZOLE SODIUM 40 MG: 40 INJECTION, POWDER, FOR SOLUTION INTRAVENOUS at 08:13

## 2021-11-06 RX ADMIN — Medication 2000 UNITS: at 08:13

## 2021-11-06 RX ADMIN — ENOXAPARIN SODIUM 30 MG: 30 INJECTION SUBCUTANEOUS at 21:21

## 2021-11-06 RX ADMIN — ENOXAPARIN SODIUM 30 MG: 30 INJECTION SUBCUTANEOUS at 08:13

## 2021-11-06 RX ADMIN — Medication 10 MG: at 21:22

## 2021-11-06 NOTE — PROGRESS NOTES
Hospitalist Progress Note    Patient: Maria D Swenson MRN: [id number]  CSN: [id number]    YOB: 1950  Age: 70 y.o. Sex: female    DOA: 11/3/2021 LOS:  LOS: 3 days            Assessment/Plan     Principal Problem:    Pneumonia due to COVID-19 virus (11/3/2021)    Active Problems:    Acute respiratory failure due to COVID-19 [facility]) (11/3/2021)      HTN (hypertension) (11/3/2021)      Diabetes mellitus type 2, controlled ([address] (11/3/2021)    Pneumonia due to Covid with hypoxia, fever, SOB: On IV Decadron. 5 day course of remdesivir until 11/8  ID consult appreciated  02 support, wean as tolerated  duplex of lower extremity neg for DVT  lovenox BID for DVT prevention     Hyperglycemia/diabetes: SSI PRN     Hypertension: hydralazine as needed     Mild increase in liver enzymes, US w/o overt abnl     Diarrhea-imodium PRN     Elevated CK, mild rhabdo, gave  IVF NS 24 hrs, repeat CK daily     Daily ferritin and fibrinogen as well as d dimer     Hypokalemia-resolved     Continue inpatient care     Chart, labs and meds all reviewed, and patient examined in PPE    Total time of care[de-identified] 35 min    CC:   Hypoxia 2nd to COVID         Subjective:     Pt was seen and examined with the nurse in the morning round. Thru broken english we were able to assess she has no stomach pain, SOB, or pain. + diarrhea      Review of systems  General: No fevers or chills. Cardiovascular: No chest pain or pressure. No palpitations. Pulmonary: No cough, SOB  Gastrointestinal: No nausea, vomiting. Objective:      Visit Vitals  BP (!) 130/54   Pulse 86   Temp 97.2 °F (36.2 °C)   Resp 21   Ht 5' 3\" (1.6 m)   Wt 80.7 kg (177 lb 14.6 oz)   SpO2 93%   BMI 31.52 kg/m²       Physical Exam:    Gen: NAD, on NC O2. Heent:  MMM, NC, AT. Cor: s1s2 RRR. No MRG. PMI mid 5th intercostal space. Resp:  CTA b/l. No w/r/r. Nml effort and diaphragmatic excursion. Abd:  NT ND.  BS positive. No rebound or guarding. No masses.   Ext: No edema or cyanosis. Intake and Output:  Current Shift:  No intake/output data recorded. Last three shifts:  No intake/output data recorded. Labs: Results:       Chemistry Recent Labs     11/06/21 0200 11/05/21 0327 11/04/21 0100   * 175* 124*    137 136   K 4.2 4.1 3.6    106 104   CO2 29 27 26   BUN 17 14 11   CREA 0.67 0.61 0.59*   CA 7.8* 7.7* 7.9*   AGAP 2* 4 6   BUCR 25* 23* 19   AP 88 87 89   TP 5.8* 5.8* 6.0*   ALB 2.0* 2.0* 2.3*   GLOB 3.8 3.8 3.7   AGRAT 0.5* 0.5* 0.6*      CBC w/Diff Recent Labs     11/06/21 0200 11/05/21 0327 11/04/21 0100   WBC 12.1 6.6 6.3   RBC 3.71* 3.72* 3.90*   HGB 9.8* 9.6* 10.5*   HCT 31.6* 32.0* 33.4*    210 169   GRANS 81* 84* 71   LYMPH 10* 11* 24   EOS 0 0 0      Cardiac Enzymes Recent Labs     11/06/21 0200 11/05/21 0327 11/04/21  0850 11/04/21  0850 11/04/21  0100 11/04/21  0100   CPK 1,206* 2,179*   < > 2,661*   < > 2,697*   CKND1  --   --   --  0.1  --  0.2    < > = values in this interval not displayed. Coagulation No results for input(s): PTP, INR, APTT, INREXT in the last 72 hours. Lipid Panel No results found for: CHOL, CHOLPOCT, CHOLX, CHLST, CHOLV, 070934, HDL, HDLP, LDL, LDLC, DLDLP, 471245, VLDLC, VLDL, TGLX, TRIGL, TRIGP, TGLPOCT, CHHD, CHHDX   BNP No results for input(s): BNPP in the last 72 hours.    Liver Enzymes Recent Labs     11/06/21 0200   TP 5.8*   ALB 2.0*   AP 88      Thyroid Studies No results found for: T4, T3U, TSH, TSHEXT     Procedures/imaging: see electronic medical records for all procedures/Xrays and details which were not copied into this note but were reviewed prior to creation of Plan      Medications Reviewed  Josep Roe MD

## 2021-11-06 NOTE — ROUTINE PROCESS
Bedside and Verbal shift change report given to CHANTAL Patel RN (oncoming nurse) by DARLENE Austin RN (offgoing nurse). Report included the following information SBAR, Kardex, Intake/Output, MAR and Recent Results.

## 2021-11-06 NOTE — PROGRESS NOTES
2035 - Interpretor used at this time to complete assessment. Pt denies any chest pain. Pt does have dyspnea with exertion. Pt has active bowel sounds and diminished lung sounds. Pt is AOx4. Pt does have BLE weakness. 2217 - Pt received medication at this time. Medications were explained via interpretor. Pt requested toothbrush, toothpaste, and mouthwash to complete oral care. Pt left in bed with no signs of distress. Will continue to monitor. 0100 - Pt in bed sleeping with no signs of distress.

## 2021-11-06 NOTE — PROGRESS NOTES
Problem: Mobility Impaired (Adult and Pediatric)  Goal: *Acute Goals and Plan of Care (Insert Text)  Outcome: Progressing Towards Goal  Note: Physical Therapy Goals  Initiated 11/6/2021 and to be accomplished within 7 day(s)  1. Patient will move from supine to sit and sit to supine  in bed with modified independence. 2.  Patient will transfer from bed to chair and chair to bed with modified independence using the least restrictive device. 3.  Patient will perform sit to stand with modified independence. 4.  Patient will ambulate with modified independence for 150 feet with the least restrictive device. Prior Level of Function:   Difficulty with obtaining PLOF information d/t language barrier. Pt only speaks [place]. Ipad  machine in room. Attempted to use  but electronic device malfunctioning repeatedly and unable to get full history. Seems that patient was previously mobile (unsure of assistive devices or if home has steps to enter). Seems that patient lives with adult children. Will need to follow-up when  ipad is functioning correctly in order to clarify PLOF. PHYSICAL THERAPY EVALUATION    Patient: Rosie Ballard (11 y.o. female)  Date: 11/6/2021  Primary Diagnosis: Pneumonia due to COVID-19 virus [U07.1, J12.82]  Acute respiratory failure due to COVID-19 ([facility] [U07.1, J96.00]        Precautions:   Fall, [place]  needed, Monitor O2    ASSESSMENT :  Based on the objective data described below, the patient presents with decreased strength, activity tolerance. RN ok's PT eval. Pt sitting EOB upon arrival. Agrees to tx. Ipad  set up in room. Attempted to use  services for [place] language. Despite repeated attempts (some partially successful), unable to clearly communicate with patient d/t technological difficulties with  services.   Extended time required overall  With tactile cues and miming, able to complete basics of PT evaluation. Sitting balance is good. STS w/ hand hold assist and CGA. Cues for PLB technique. Pt able to work on SAW Instrument with hand hold assist, [address]. Pt requires cues PLB, and self-pacing (rest breaks). Pt on 2L O2 NC. Desats to 86% with marching activity. Recovers with sitting rest and PLB to 90%. Pt returns to bed and back to supine with min A (assist required for LE's). Pt left with all needs within reach. RN updated on pt progress. Patient will benefit from skilled intervention to address the above impairments. Patient's rehabilitation potential is considered to be Good  Factors which may influence rehabilitation potential include:   []         None noted  [x]         Mental ability/status  [x]         Medical condition  [x]         Home/family situation and support systems  [x]         Safety awareness  []         Pain tolerance/management  []         Other:      PLAN :  Recommendations and Planned Interventions:   [x]           Bed Mobility Training             [x]    Neuromuscular Re-Education  [x]           Transfer Training                   []    Orthotic/Prosthetic Training  [x]           Gait Training                          []    Modalities  [x]           Therapeutic Exercises           []    Edema Management/Control  [x]           Therapeutic Activities            [x]    Family Training/Education  [x]           Patient Education  []           Other (comment):    Frequency/Duration: Patient will be followed by physical therapy 1-2 times per day/4-7 days per week to address goals. Discharge Recommendations: Home Health Physical Therapy vs SNF pending progress with inpatient physical therapy  Further Equipment Recommendations for Discharge: TBD     SUBJECTIVE:   Patient nods yes when  explains therapist is in room to evaluate pt's mobility.     OBJECTIVE DATA SUMMARY:     Past Medical History:   Diagnosis Date    Diabetes ([facility]     HTN (hypertension)     Hyperlipidemia    History reviewed. No pertinent surgical history. Barriers to Learning/Limitations: yes;  language  Compensate with: Visual Cues and Tactile Cues  Home Situation:  Home Situation  Home Environment: Private residence  Living Alone: No  Support Systems: Child(ama)  Patient Expects to be Discharged to[de-identified] House  Critical Behavior:  Neurologic State: Alert; Appropriate for age  Orientation Level: Appropriate for age     Safety/Judgement: Fall prevention  Psychosocial  Purposeful Interaction: Yes  Pt Identified Daily Priority: Clinical issues (comment)  Caritas Process: Nurture loving kindness; Teaching/learning  Caring Interventions: Reassure; Therapeutic modalities  Reassure: Therapeutic listening; Informing; Caring rounds  Therapeutic Modalities: Humor; Intentional therapeutic touch  Strength:    Strength: Generally decreased, functional  Tone & Sensation:   Tone: Normal  Sensation: Intact  Range Of Motion:  AROM: Generally decreased, functional  Functional Mobility:  Bed Mobility:  Supine to Sit: Contact guard assistance; Bed Modified  Sit to Supine: Contact guard assistance; Bed Modified  Scooting: Minimum assistance  Transfers:  Sit to Stand: Contact guard assistance  Stand to Sit: Contact guard assistance  Balance:   Sitting: Intact; Without support  Standing: Impaired; Without support  Standing - Static: Good  Standing - Dynamic : Fair  Ambulation/Gait Training:  Pre-gait marching with no AD, hand hold assist (CGA). 2 bouts for 1 minute each. Therapeutic Exercises:   LAQ, AP, seated march  Pain:  Pain level pre-treatment: 0/10   Pain level post-treatment: 0/10     Activity Tolerance:   Fair    Please refer to the flowsheet for vital signs taken during this treatment.   After treatment:   []         Patient left in no apparent distress sitting up in chair  [x]         Patient left in no apparent distress in bed  [x]         Call bell left within reach  [x]         Nursing notified  []         Caregiver present  []         Bed alarm activated  []         SCDs applied    COMMUNICATION/EDUCATION:   [x]         Role of Physical Therapy in the acute care setting. [x]         Fall prevention education was provided and the patient/caregiver indicated understanding. [x]         Patient/family have participated as able in goal setting and plan of care. [x]         Patient/family agree to work toward stated goals and plan of care. []         Patient understands intent and goals of therapy, but is neutral about his/her participation. []         Patient is unable to participate in goal setting/plan of care: ongoing with therapy staff.  []         Other:     Thank you for this referral.  Heladio Lawler, PT   Time Calculation: 38 mins      Eval Complexity: History: MEDIUM  Complexity : 1-2 comorbidities / personal factors will impact the outcome/ POC Exam:MEDIUM Complexity : 3 Standardized tests and measures addressing body structure, function, activity limitation and / or participation in recreation  Presentation: MEDIUM Complexity : Evolving with changing characteristics  Clinical Decision Making:Medium Complexity   Overall Complexity:MEDIUM

## 2021-11-06 NOTE — PROGRESS NOTES
Problem: Risk for Spread of Infection  Goal: Prevent transmission of infectious organism to others  Description: Prevent the transmission of infectious organisms to other patients, staff members, and visitors. Outcome: Progressing Towards Goal     Problem: Patient Education:  Go to Education Activity  Goal: Patient/Family Education  Outcome: Progressing Towards Goal     Problem: Airway Clearance - Ineffective  Goal: Achieve or maintain patent airway  Outcome: Progressing Towards Goal     Problem: Gas Exchange - Impaired  Goal: Absence of hypoxia  Outcome: Progressing Towards Goal  Goal: Promote optimal lung function  Outcome: Progressing Towards Goal     Problem: Breathing Pattern - Ineffective  Goal: Ability to achieve and maintain a regular respiratory rate  Outcome: Progressing Towards Goal     Problem:  Body Temperature -  Risk of, Imbalanced  Goal: Ability to maintain a body temperature within defined limits  Outcome: Progressing Towards Goal  Goal: Will regain or maintain usual level of consciousness  Outcome: Progressing Towards Goal  Goal: Complications related to the disease process, condition or treatment will be avoided or minimized  Outcome: Progressing Towards Goal     Problem: Isolation Precautions - Risk of Spread of Infection  Goal: Prevent transmission of infectious organism to others  Outcome: Progressing Towards Goal     Problem: Nutrition Deficits  Goal: Optimize nutrtional status  Outcome: Progressing Towards Goal     Problem: Risk for Fluid Volume Deficit  Goal: Maintain normal heart rhythm  Outcome: Progressing Towards Goal  Goal: Maintain absence of muscle cramping  Outcome: Progressing Towards Goal  Goal: Maintain normal serum potassium, sodium, calcium, phosphorus, and pH  Outcome: Progressing Towards Goal     Problem: Loneliness or Risk for Loneliness  Goal: Demonstrate positive use of time alone when socialization is not possible  Outcome: Progressing Towards Goal     Problem: Fatigue  Goal: Verbalize increase energy and improved vitality  Outcome: Progressing Towards Goal     Problem: Patient Education: Go to Patient Education Activity  Goal: Patient/Family Education  Outcome: Progressing Towards Goal     Problem: Falls - Risk of  Goal: *Absence of Falls  Description: Document Machelle De Leon Fall Risk and appropriate interventions in the flowsheet.   Outcome: Progressing Towards Goal  Note: Fall Risk Interventions:  Mobility Interventions: Utilize walker, cane, or other assistive device, Utilize gait belt for transfers/ambulation, Patient to call before getting OOB         Medication Interventions: Bed/chair exit alarm, Patient to call before getting OOB, Teach patient to arise slowly    Elimination Interventions: Call light in reach              Problem: Patient Education: Go to Patient Education Activity  Goal: Patient/Family Education  Outcome: Progressing Towards Goal     Problem: Patient Education: Go to Patient Education Activity  Goal: Patient/Family Education  Outcome: Progressing Towards Goal

## 2021-11-07 ENCOUNTER — APPOINTMENT (OUTPATIENT)
Dept: CT IMAGING | Age: 71
DRG: 177 | End: 2021-11-07
Attending: FAMILY MEDICINE
Payer: MEDICARE

## 2021-11-07 ENCOUNTER — APPOINTMENT (OUTPATIENT)
Dept: GENERAL RADIOLOGY | Age: 71
DRG: 177 | End: 2021-11-07
Attending: INTERNAL MEDICINE
Payer: MEDICARE

## 2021-11-07 LAB
ALBUMIN SERPL-MCNC: 2.1 G/DL (ref 3.4–5)
ALBUMIN SERPL-MCNC: 2.1 G/DL (ref 3.4–5)
ALBUMIN/GLOB SERPL: 0.6 {RATIO} (ref 0.8–1.7)
ALBUMIN/GLOB SERPL: 0.6 {RATIO} (ref 0.8–1.7)
ALP SERPL-CCNC: 91 U/L (ref 45–117)
ALP SERPL-CCNC: 93 U/L (ref 45–117)
ALT SERPL-CCNC: 66 U/L (ref 13–56)
ALT SERPL-CCNC: 78 U/L (ref 13–56)
ANION GAP SERPL CALC-SCNC: 4 MMOL/L (ref 3–18)
ANION GAP SERPL CALC-SCNC: 8 MMOL/L (ref 3–18)
AST SERPL-CCNC: 100 U/L (ref 10–38)
AST SERPL-CCNC: 103 U/L (ref 10–38)
BASOPHILS # BLD: 0 K/UL (ref 0–0.1)
BASOPHILS # BLD: 0 K/UL (ref 0–0.1)
BASOPHILS NFR BLD: 0 % (ref 0–2)
BASOPHILS NFR BLD: 0 % (ref 0–2)
BILIRUB SERPL-MCNC: 0.9 MG/DL (ref 0.2–1)
BILIRUB SERPL-MCNC: 1 MG/DL (ref 0.2–1)
BUN SERPL-MCNC: 14 MG/DL (ref 7–18)
BUN SERPL-MCNC: 15 MG/DL (ref 7–18)
BUN/CREAT SERPL: 24 (ref 12–20)
BUN/CREAT SERPL: 24 (ref 12–20)
CALCIUM SERPL-MCNC: 7.9 MG/DL (ref 8.5–10.1)
CALCIUM SERPL-MCNC: 8 MG/DL (ref 8.5–10.1)
CHLORIDE SERPL-SCNC: 101 MMOL/L (ref 100–111)
CHLORIDE SERPL-SCNC: 105 MMOL/L (ref 100–111)
CO2 SERPL-SCNC: 26 MMOL/L (ref 21–32)
CO2 SERPL-SCNC: 30 MMOL/L (ref 21–32)
CREAT SERPL-MCNC: 0.59 MG/DL (ref 0.6–1.3)
CREAT SERPL-MCNC: 0.63 MG/DL (ref 0.6–1.3)
CRP SERPL-MCNC: 5.9 MG/DL (ref 0–0.3)
D DIMER PPP FEU-MCNC: 1.57 UG/ML(FEU)
DIFFERENTIAL METHOD BLD: ABNORMAL
DIFFERENTIAL METHOD BLD: ABNORMAL
EOSINOPHIL # BLD: 0 K/UL (ref 0–0.4)
EOSINOPHIL # BLD: 0 K/UL (ref 0–0.4)
EOSINOPHIL NFR BLD: 0 % (ref 0–5)
EOSINOPHIL NFR BLD: 0 % (ref 0–5)
ERYTHROCYTE [DISTWIDTH] IN BLOOD BY AUTOMATED COUNT: 12.8 % (ref 11.6–14.5)
ERYTHROCYTE [DISTWIDTH] IN BLOOD BY AUTOMATED COUNT: 12.9 % (ref 11.6–14.5)
FERRITIN SERPL-MCNC: 2329 NG/ML (ref 8–388)
GLOBULIN SER CALC-MCNC: 3.7 G/DL (ref 2–4)
GLOBULIN SER CALC-MCNC: 3.7 G/DL (ref 2–4)
GLUCOSE BLD STRIP.AUTO-MCNC: 131 MG/DL (ref 70–110)
GLUCOSE BLD STRIP.AUTO-MCNC: 139 MG/DL (ref 70–110)
GLUCOSE BLD STRIP.AUTO-MCNC: 169 MG/DL (ref 70–110)
GLUCOSE BLD STRIP.AUTO-MCNC: 204 MG/DL (ref 70–110)
GLUCOSE SERPL-MCNC: 138 MG/DL (ref 74–99)
GLUCOSE SERPL-MCNC: 215 MG/DL (ref 74–99)
HCT VFR BLD AUTO: 31.4 % (ref 35–45)
HCT VFR BLD AUTO: 33.5 % (ref 35–45)
HGB BLD-MCNC: 10.3 G/DL (ref 12–16)
HGB BLD-MCNC: 9.7 G/DL (ref 12–16)
LYMPHOCYTES # BLD: 0.6 K/UL (ref 0.9–3.6)
LYMPHOCYTES # BLD: 0.9 K/UL (ref 0.9–3.6)
LYMPHOCYTES NFR BLD: 5 % (ref 21–52)
LYMPHOCYTES NFR BLD: 8 % (ref 21–52)
MCH RBC QN AUTO: 26.4 PG (ref 24–34)
MCH RBC QN AUTO: 26.8 PG (ref 24–34)
MCHC RBC AUTO-ENTMCNC: 30.7 G/DL (ref 31–37)
MCHC RBC AUTO-ENTMCNC: 30.9 G/DL (ref 31–37)
MCV RBC AUTO: 85.3 FL (ref 78–100)
MCV RBC AUTO: 87 FL (ref 78–100)
MONOCYTES # BLD: 0.6 K/UL (ref 0.05–1.2)
MONOCYTES # BLD: 0.8 K/UL (ref 0.05–1.2)
MONOCYTES NFR BLD: 5 % (ref 3–10)
MONOCYTES NFR BLD: 7 % (ref 3–10)
NEUTS BAND NFR BLD MANUAL: 1 % (ref 0–5)
NEUTS SEG # BLD: 11.7 K/UL (ref 1.8–8)
NEUTS SEG # BLD: 9.2 K/UL (ref 1.8–8)
NEUTS SEG NFR BLD: 82 % (ref 40–73)
NEUTS SEG NFR BLD: 89 % (ref 40–73)
PLATELET # BLD AUTO: 314 K/UL (ref 135–420)
PLATELET # BLD AUTO: 342 K/UL (ref 135–420)
PLATELET COMMENTS,PCOM: ABNORMAL
PMV BLD AUTO: 11.1 FL (ref 9.2–11.8)
PMV BLD AUTO: 11.2 FL (ref 9.2–11.8)
POTASSIUM SERPL-SCNC: 4 MMOL/L (ref 3.5–5.5)
POTASSIUM SERPL-SCNC: 4.1 MMOL/L (ref 3.5–5.5)
PROCALCITONIN SERPL-MCNC: 0.16 NG/ML
PROT SERPL-MCNC: 5.8 G/DL (ref 6.4–8.2)
PROT SERPL-MCNC: 5.8 G/DL (ref 6.4–8.2)
RBC # BLD AUTO: 3.68 M/UL (ref 4.2–5.3)
RBC # BLD AUTO: 3.85 M/UL (ref 4.2–5.3)
RBC MORPH BLD: ABNORMAL
SODIUM SERPL-SCNC: 135 MMOL/L (ref 136–145)
SODIUM SERPL-SCNC: 139 MMOL/L (ref 136–145)
WBC # BLD AUTO: 11.2 K/UL (ref 4.6–13.2)
WBC # BLD AUTO: 12.9 K/UL (ref 4.6–13.2)

## 2021-11-07 PROCEDURE — 82728 ASSAY OF FERRITIN: CPT

## 2021-11-07 PROCEDURE — 74011250637 HC RX REV CODE- 250/637: Performed by: INTERNAL MEDICINE

## 2021-11-07 PROCEDURE — 74011000250 HC RX REV CODE- 250: Performed by: INTERNAL MEDICINE

## 2021-11-07 PROCEDURE — 84145 PROCALCITONIN (PCT): CPT

## 2021-11-07 PROCEDURE — 74011250636 HC RX REV CODE- 250/636: Performed by: HOSPITALIST

## 2021-11-07 PROCEDURE — 85379 FIBRIN DEGRADATION QUANT: CPT

## 2021-11-07 PROCEDURE — 80053 COMPREHEN METABOLIC PANEL: CPT

## 2021-11-07 PROCEDURE — 85025 COMPLETE CBC W/AUTO DIFF WBC: CPT

## 2021-11-07 PROCEDURE — 74011000258 HC RX REV CODE- 258: Performed by: INTERNAL MEDICINE

## 2021-11-07 PROCEDURE — 82962 GLUCOSE BLOOD TEST: CPT

## 2021-11-07 PROCEDURE — 36415 COLL VENOUS BLD VENIPUNCTURE: CPT

## 2021-11-07 PROCEDURE — 86140 C-REACTIVE PROTEIN: CPT

## 2021-11-07 PROCEDURE — 65660000000 HC RM CCU STEPDOWN

## 2021-11-07 PROCEDURE — 74011250636 HC RX REV CODE- 250/636: Performed by: INTERNAL MEDICINE

## 2021-11-07 PROCEDURE — 74011250636 HC RX REV CODE- 250/636

## 2021-11-07 PROCEDURE — 74011000636 HC RX REV CODE- 636: Performed by: INTERNAL MEDICINE

## 2021-11-07 PROCEDURE — 71045 X-RAY EXAM CHEST 1 VIEW: CPT

## 2021-11-07 PROCEDURE — 71275 CT ANGIOGRAPHY CHEST: CPT

## 2021-11-07 PROCEDURE — 74011636637 HC RX REV CODE- 636/637: Performed by: INTERNAL MEDICINE

## 2021-11-07 PROCEDURE — 77010033711 HC HIGH FLOW OXYGEN

## 2021-11-07 PROCEDURE — C9113 INJ PANTOPRAZOLE SODIUM, VIA: HCPCS | Performed by: INTERNAL MEDICINE

## 2021-11-07 RX ORDER — DEXAMETHASONE SODIUM PHOSPHATE 4 MG/ML
INJECTION, SOLUTION INTRA-ARTICULAR; INTRALESIONAL; INTRAMUSCULAR; INTRAVENOUS; SOFT TISSUE
Status: COMPLETED
Start: 2021-11-07 | End: 2021-11-07

## 2021-11-07 RX ADMIN — Medication 2000 UNITS: at 10:01

## 2021-11-07 RX ADMIN — ENOXAPARIN SODIUM 30 MG: 30 INJECTION SUBCUTANEOUS at 10:01

## 2021-11-07 RX ADMIN — Medication 10 ML: at 22:00

## 2021-11-07 RX ADMIN — Medication 10 MG: at 21:53

## 2021-11-07 RX ADMIN — INSULIN LISPRO 2 UNITS: 100 INJECTION, SOLUTION INTRAVENOUS; SUBCUTANEOUS at 07:22

## 2021-11-07 RX ADMIN — PANTOPRAZOLE SODIUM 40 MG: 40 INJECTION, POWDER, FOR SOLUTION INTRAVENOUS at 10:01

## 2021-11-07 RX ADMIN — INSULIN LISPRO 4 UNITS: 100 INJECTION, SOLUTION INTRAVENOUS; SUBCUTANEOUS at 12:55

## 2021-11-07 RX ADMIN — IOPAMIDOL 100 ML: 755 INJECTION, SOLUTION INTRAVENOUS at 14:32

## 2021-11-07 RX ADMIN — DEXAMETHASONE SODIUM PHOSPHATE: 4 INJECTION, SOLUTION INTRAMUSCULAR; INTRAVENOUS at 22:00

## 2021-11-07 RX ADMIN — Medication 10 ML: at 05:24

## 2021-11-07 RX ADMIN — REMDESIVIR 100 MG: 100 INJECTION, POWDER, LYOPHILIZED, FOR SOLUTION INTRAVENOUS at 01:12

## 2021-11-07 RX ADMIN — Medication 500 MG: at 21:53

## 2021-11-07 RX ADMIN — ZINC SULFATE 220 MG (50 MG) CAPSULE 1 CAPSULE: CAPSULE at 10:01

## 2021-11-07 RX ADMIN — DEXAMETHASONE SODIUM PHOSPHATE 10 MG: 4 INJECTION, SOLUTION INTRAMUSCULAR; INTRAVENOUS at 21:55

## 2021-11-07 RX ADMIN — Medication 500 MG: at 10:01

## 2021-11-07 RX ADMIN — ENOXAPARIN SODIUM 30 MG: 30 INJECTION SUBCUTANEOUS at 21:54

## 2021-11-07 NOTE — PROGRESS NOTES
Problem: Risk for Spread of Infection  Goal: Prevent transmission of infectious organism to others  Description: Prevent the transmission of infectious organisms to other patients, staff members, and visitors. Outcome: Progressing Towards Goal     Problem: Patient Education:  Go to Education Activity  Goal: Patient/Family Education  Outcome: Progressing Towards Goal     Problem: Airway Clearance - Ineffective  Goal: Achieve or maintain patent airway  Outcome: Progressing Towards Goal     Problem: Gas Exchange - Impaired  Goal: Absence of hypoxia  Outcome: Progressing Towards Goal  Goal: Promote optimal lung function  Outcome: Progressing Towards Goal     Problem: Breathing Pattern - Ineffective  Goal: Ability to achieve and maintain a regular respiratory rate  Outcome: Progressing Towards Goal     Problem:  Body Temperature -  Risk of, Imbalanced  Goal: Ability to maintain a body temperature within defined limits  Outcome: Progressing Towards Goal  Goal: Will regain or maintain usual level of consciousness  Outcome: Progressing Towards Goal  Goal: Complications related to the disease process, condition or treatment will be avoided or minimized  Outcome: Progressing Towards Goal     Problem: Isolation Precautions - Risk of Spread of Infection  Goal: Prevent transmission of infectious organism to others  Outcome: Progressing Towards Goal     Problem: Nutrition Deficits  Goal: Optimize nutrtional status  Outcome: Progressing Towards Goal     Problem: Risk for Fluid Volume Deficit  Goal: Maintain normal heart rhythm  Outcome: Progressing Towards Goal  Goal: Maintain absence of muscle cramping  Outcome: Progressing Towards Goal  Goal: Maintain normal serum potassium, sodium, calcium, phosphorus, and pH  Outcome: Progressing Towards Goal     Problem: Loneliness or Risk for Loneliness  Goal: Demonstrate positive use of time alone when socialization is not possible  Outcome: Progressing Towards Goal     Problem: Fatigue  Goal: Verbalize increase energy and improved vitality  Outcome: Progressing Towards Goal     Problem: Patient Education: Go to Patient Education Activity  Goal: Patient/Family Education  Outcome: Progressing Towards Goal     Problem: Falls - Risk of  Goal: *Absence of Falls  Description: Document Charity Quinones Fall Risk and appropriate interventions in the flowsheet.   Outcome: Progressing Towards Goal  Note: Fall Risk Interventions:  Mobility Interventions: Utilize walker, cane, or other assistive device, Utilize gait belt for transfers/ambulation, Patient to call before getting OOB         Medication Interventions: Bed/chair exit alarm, Patient to call before getting OOB, Teach patient to arise slowly    Elimination Interventions: Call light in reach              Problem: Patient Education: Go to Patient Education Activity  Goal: Patient/Family Education  Outcome: Progressing Towards Goal     Problem: Patient Education: Go to Patient Education Activity  Goal: Patient/Family Education  Outcome: Progressing Towards Goal

## 2021-11-07 NOTE — PROGRESS NOTES
[facility]  (A Division of [address] [place] [address])                                                                                                                    Eric Avendaño MD  Office #: - Option # [phone]  Fax #: [phone]     Date of Admission: 11/3/2021Date of Note: 11/7/2021  Reason for FU: Evaluation and antibiotic management of CPVID 19 infection  . Current Antimicrobials:    Prior Antimicrobials:    Remdesivir 11/3 to date  Dexamethasone 11/3 to date     NA       Assessment- ID related:  --------------------------------------------------------------------------  · COVId 19 infection  · Viral PNA- Bilateral- Patchy infiltrate by CXR and B/L multilobular GGO on CT  · Acute hypoxic respiratory failure PaO2 50 on ABG:  Worse overnight  · Rhabdo 2/2 viral infection with transaminitis  · CoNS bacteremia- likely contaminant    COVId rapid + 11/3  Influenza A antigen : negative  Procal X2 NL  DD elevated to 7.84-> 2.28  CRP 12.7-> 14.5-> 8.8  Ferretin 75168-> 2953    HIV 1/2- negative  Hepatitis C ab/S antigen -negative. TB serology pending  Improving bio-markers while worsening hypoxia overnight, need further evaluation. Other Medical Issues- Mx per respective team:  · HTN  · Hyperlipidemia  · DM     Recommendation for ID issues I am following:  --------------------------------------------------------------------------  -cont dexamethasone/ remdesivir      -> rapid increase to 15L overnight. COVID PNA related Vs PE? CXR stat, cnc w/diff, cmp, procal/CRP/artemio STAT ordered  DW Dr Cristiano Rodney  May need CTA chest and further ABG- per primary team     DW pt to prone, IS  Interviewed via  Audio, hard of hearing. Left VM to her son to discuss adding WALTER 1/2 inhibitor    FU TB serology         Subjective:  Interviewed via Audio  for [place], hard of hearing  Events noted- she was hypoxic and adjusted O2 flow to 15L yesterday  She was sitting and eating breakfast, no acute distress noted. She denies chest pain/sob//V/abd pain or diarrhea. Labs from 11/6 reviewed, no labs this am          HPI:  Laina Hunt is a 70 y.o. BLACK/ who is unvaccinated with PMH as listed below with limited communication due to [address] barrier. Admitted on 11/3 with hypoxia/symptoms of SOB and fever and body aches X4 days. Was hypoxic to 86% on RA-- apparently with muliplte family members sick with COVID 19 too. Found with fever, B/L lung infiltrate on CXR and CTA chest without PE, elevared D-dimer, high ferretin and CRP. Oxygen demand has been 4litres and is better down to 2 L at exam today. Currently on Remdesvir/dexamethasone. Yin Walker Tempie Treasure from admission 1 set + for [address]. Active Hospital Problems    Diagnosis Date Noted    Acute respiratory failure due to COVID-19 [facility]) 11/03/2021    Pneumonia due to COVID-19 virus 11/03/2021    HTN (hypertension) 11/03/2021    Diabetes mellitus type 2, controlled ([facility] 11/03/2021     Past Medical History:   Diagnosis Date    Diabetes ([facility]     HTN (hypertension)     Hyperlipidemia      History reviewed. No pertinent surgical history. Family History   Problem Relation Age of Onset    Hypertension Mother      Social History     Socioeconomic History    Marital status:      Spouse name: Not on file    Number of children: Not on file    Years of education: Not on file    Highest education level: Not on file   Occupational History    Not on file   Tobacco Use    Smoking status: Never Smoker    Smokeless tobacco: Never Used   Substance and Sexual Activity    Alcohol use:  Yes    Drug use: Never    Sexual activity: Not Currently   Other Topics Concern    Not on file   Social History Narrative    Not on file     Social Determinants of Health     Financial Resource Strain:     Difficulty of Paying Living Expenses: Not on file   Food Insecurity:     Worried About [address] in the Last Year: Not on file    Ran Out of Food in the Last Year: Not on file   Transportation Needs:     Lack of Transportation (Medical): Not on file    Lack of Transportation (Non-Medical): Not on file   Physical Activity:     Days of Exercise per Week: Not on file    Minutes of Exercise per Session: Not on file   Stress:     Feeling of Stress : Not on file   Social Connections:     Frequency of Communication with Friends and Family: Not on file    Frequency of Social Gatherings with Friends and Family: Not on file    Attends Anabaptist Services: Not on file    Active Member of [address] [place] [address] [employer] or Organizations: Not on file    Attends Club or Organization Meetings: Not on file    Marital Status: Not on file   Intimate Partner Violence:     Fear of Current or Ex-Partner: Not on file    Emotionally Abused: Not on file    Physically Abused: Not on file    Sexually Abused: Not on file   Housing Stability:     Unable to Pay for Housing in the Last Year: Not on file    Number of [place] in the Last Year: Not on file    Unstable Housing in the Last Year: Not on file       Allergies:  Patient has no known allergies.      Medications:  Current Facility-Administered Medications   Medication Dose Route Frequency    remdesivir 100 mg in 0.9% sodium chloride 250 mL IVPB  100 mg IntraVENous Q24H    dexamethasone (DECADRON) 4 mg/mL injection 10 mg  10 mg IntraVENous Q24H    loperamide (IMODIUM) capsule 4 mg  4 mg Oral Q4H PRN    sodium chloride (NS) flush 5-10 mL  5-10 mL IntraVENous PRN    enoxaparin (LOVENOX) injection 30 mg  30 mg SubCUTAneous Q12H    acetaminophen (TYLENOL) tablet 650 mg  650 mg Oral Q6H PRN    Or    acetaminophen (TYLENOL) suppository 650 mg  650 mg Rectal Q6H PRN    guaiFENesin-dextromethorphan (ROBITUSSIN DM) 100-10 mg/5 mL syrup 5 mL  5 mL Oral Q4H PRN    cholecalciferol (VITAMIN D3) (1000 Units /25 mcg) tablet 2,000 Units  2,000 Units Oral DAILY    insulin lispro (HUMALOG) injection   SubCUTAneous AC&HS    glucose chewable tablet 16 g  4 Tablet Oral PRN    glucagon (GLUCAGEN) injection 1 mg  1 mg IntraMUSCular PRN    dextrose (D50W) injection syrg 12.5-25 g  25-50 mL IntraVENous PRN    sodium chloride (NS) flush 5-40 mL  5-40 mL IntraVENous Q8H    sodium chloride (NS) flush 5-40 mL  5-40 mL IntraVENous PRN    polyethylene glycol (MIRALAX) packet 17 g  17 g Oral DAILY PRN    ondansetron (ZOFRAN ODT) tablet 4 mg  4 mg Oral Q8H PRN    Or    ondansetron (ZOFRAN) injection 4 mg  4 mg IntraVENous Q6H PRN    zinc sulfate (ZINCATE) 50 mg zinc (220 mg) capsule 1 Capsule  1 Capsule Oral DAILY    ascorbic acid (vitamin C) (VITAMIN C) tablet 500 mg  500 mg Oral BID    melatonin (rapid dissolve) tablet 10 mg  10 mg Oral QHS    pantoprazole (PROTONIX) injection 40 mg  40 mg IntraVENous DAILY    hydrALAZINE (APRESOLINE) 20 mg/mL injection 10 mg  10 mg IntraVENous Q6H PRN          Physical Exam:    Temp (24hrs), Av.9 °F (36.6 °C), Min:97.7 °F (36.5 °C), Max:98 °F (36.7 °C)    Visit Vitals  BP (!) 128/55   Pulse 87   Temp 97.9 °F (36.6 °C)   Resp 20   Ht 5' 3\" (1.6 m)   Wt 81 kg (178 lb 8 oz)   SpO2 99%   BMI 31.62 kg/m²          GEN: WDWN, sitting up and eating. On 15L oxygen via NC  HEENT: Unicteric. EOMI intact  CHEST: Non laboured breathing. CTA  CVS:RRR, no mur/gallop  ABD: Obese/soft. Non tender. PEPE: Deferred  EXT: No apparent swelling or redness on UE/LE joints. Skin: Dry and intact. No rash, no redness. CNS: A, OX3. Moves all extremity. CN grossly ok. Microbiology  All Micro Results     Procedure Component Value Units Date/Time    CULTURE, BLOOD [[id number]]  (Abnormal) Collected: 21 8139    Order Status: Completed Specimen: Blood Updated: 21 8887     Special Requests: NO SPECIAL REQUESTS        GRAM STAIN       AEROBIC BOTTLE GRAM POSITIVE COCCI IN CLUSTERS                  SMEAR CALLED TO AND CORRECTLY REPEATED BY: Maira Medrano RN 3N BY CAM ON 21 AT 1233 [address].                   Andrey Liz POSITIVE COCCI IN CLUSTERS ANAEROBIC BOTTLE                  SMEAR CALLED TO AND CORRECTLY REPEATED BY: Brenton Reese RN 3N AT 1500 ON 11/04/2021 TO [facility]. Culture result:       STAPHYLOCOCCUS SPECIES, COAGULASE NEGATIVE MULTIPLE COLONY TYPES/STRAINS GROWING IN THE AEROBIC AND ANAEROBIC BOTTLES NO SITE INDICATED          LEGIONELLA PNEUMOPHILA AG, URINE [[id number]] Collected: 11/03/21 2045    Order Status: Canceled Specimen: Urine     STREP Giacomo Kitmien, URINE [[id number]] Collected: 11/03/21 2045    Order Status: Canceled Specimen: Urine     CULTURE, RESPIRATORY/SPUTUM/BRONCH Arch Pickles STAIN [[id number]] Collected: 11/03/21 2045    Order Status: Canceled Specimen: Sputum     INFLUENZA A & B AG (RAPID TEST) [[id number]] Collected: 11/03/21 1813    Order Status: Completed Specimen: Nasopharyngeal from Nasal washing Updated: 11/03/21 1843     Influenza A Antigen Negative        Comment: A negative result does not exclude influenza virus infection, seasonal or H1N1 due to suboptimal sensitivity. If influenza is circulating in your community, a diagnosis of influenza should be considered based on a patients clinical presentation and empiric antiviral treatment should be considered, if indicated. Influenza B Antigen Negative       COVID-19 RAPID TEST [[id number]]  (Abnormal) Collected: 11/03/21 1813    Order Status: Completed Specimen: Nasopharyngeal Updated: 11/03/21 1840     Specimen source Nasopharyngeal        COVID-19 rapid test Detected        Comment:      The specimen is POSITIVE for SARS-CoV-2, the novel coronavirus associated with COVID-19. This test has been authorized by the FDA under an Emergency Use Authorization (EUA) for use by authorized laboratories.         Fact sheet for Healthcare Providers: iTendency.uy  Fact sheet for Patients: iTendency.uy       Methodology: Isothermal Nucleic Acid Amplification  CALLED TO AND CORRECTLY REPEATED BY: ALEX TAY IN ER  [place] ON 11/03/2021 TO [facility].                   Lines / Catheters:  Lab results:    Chemistry  Recent Labs     11/06/21 2330 11/06/21 0200 11/05/21 0327   * 140* 175*    139 137   K 4.0 4.2 4.1    108 106   CO2 26 29 27   BUN 14 17 14   CREA 0.59* 0.67 0.61   CA 7.9* 7.8* 7.7*   AGAP 8 2* 4   BUCR 24* 25* 23*   AP 91 88 87   TP 5.8* 5.8* 5.8*   ALB 2.1* 2.0* 2.0*   GLOB 3.7 3.8 3.8   AGRAT 0.6* 0.5* 0.5*       CBC w/ Diff  Recent Labs     11/06/21 2330 11/06/21 0200 11/05/21 0327   WBC 12.9 12.1 6.6   RBC 3.68* 3.71* 3.72*   HGB 9.7* 9.8* 9.6*   HCT 31.4* 31.6* 32.0*    273 210   GRANS 89* 81* 84*   LYMPH 5* 10* 11*   EOS 0 0 0       Imaging: report posted below as per radiologist - reports reviewed in EPIC

## 2021-11-07 NOTE — PROGRESS NOTES
0710 Bedside and Verbal shift change report given to ALEX Davenport RN (oncoming nurse) by Carlyn Bird. Tasia Hernandez, JOSEPH (offgoing nurse). Report included the following information SBAR, Kardex, Intake/Output, and MAR. Pt in bed, call light in reach. 0800 Pt assessed. No signs of acute distress. Pt in bed. 1000 IV found out of the patient this am. IV removed, 22g placed in R hand and 20g placed in R [facility] for CTA. 56 Notified by CT pt's current 20g in [facility] is not suitable for CTA    1319 New 20g placed in left [facility] for CTA. 1440 Pt's IV infiltrated while in CT    1500 Spoke to medic to get new IV line    1635 Pt assessed. No signs of acute distress. Pt in bed. 1830 Spoke to CT, pt unable to have CT for a few hours due to pt having a light dinner    1930 Bedside and Verbal shift change report given to JOSEPH Coronel (oncoming nurse) by Babak Mcgill. Naga Davenport, RN (offgoing nurse). Report included the following information SBAR, Kardex, Intake/Output and MAR. Pt in bed, call light in reach.

## 2021-11-07 NOTE — PROGRESS NOTES
Pt desatting on 6 lpm n/c in to the 80's. Pt subsequently placed on salter higher flow cannula.  Pt tolerating well at this time:       11/06/21 2217   Vitals   Resp Rate 20   O2 Sat (%) 94 %   Oxygen Therapy   Pulse via Oximetry 87 beats per minute   O2 Device Nasal cannula  (salter (device change))   O2 Flow Rate (L/min) 15 l/min   FIO2 (%) 80 %

## 2021-11-07 NOTE — PROGRESS NOTES
Hospitalist Progress Note    Patient: Ester Cabello MRN: [id number]  CSN: [id number]    YOB: 1950  Age: 70 y.o. Sex: female    DOA: 11/3/2021 LOS:  LOS: 4 days            Assessment/Plan     Principal Problem:    Pneumonia due to COVID-19 virus (11/3/2021)    Active Problems:    Acute respiratory failure due to COVID-19 [facility]) (11/3/2021)      HTN (hypertension) (11/3/2021)      Diabetes mellitus type 2, controlled ([address] (11/3/2021)      Hypoxia 2nd to COVID PNA: up to 15 L O2 last night. stat CTA for hypoxia. Discussed the case with ID/Dr. Mack Castaneda  Stat CXR: COVID PNA change    Pneumonia due to Covid with hypoxia, fever, SOB: On IV Decadron. 5 day course of remdesivir until 11/8  02 support, wean as tolerated  duplex of lower extremity neg for DVT  lovenox BID for DVT prevention     Hyperglycemia/diabetes: SSI PRN     Hypertension: hydralazine as needed     Mild increase in liver enzymes, US w/o overt abnl     Diarrhea-imodium PRN     Elevated CK, mild rhabdo, gave  IVF NS 24 hrs, repeat CK daily     Daily ferritin and fibrinogen as well as d dimer     Hypokalemia-resolved    Incentive spirometry     Continue inpatient care    [facility]ad [facility] provided by [facility]     Chart, labs and meds all reviewed, and patient examined in PPE     Total time of care[de-identified] 45 min     CC:   Hypoxia 2nd to COVID         Subjective:      Pt was seen and examined with the nurse in the morning round.     Hypoxia. Pt desatting on 6 lpm n/c in to the 80's last ngiht. Pt subsequently placed on salter higher flow cannula/15 L since      Review of systems  General: No fevers or chills. Cardiovascular: No chest pain or pressure. No palpitations.    Pulmonary: + cough, SOB  Gastrointestinal: No nausea, vomiting.        Objective:      Visit Vitals  BP (!) 132/59   Pulse 78   Temp 97.9 °F (36.6 °C)   Resp 18   Ht 5' 3\" (1.6 m)   Wt 81 kg (178 lb 8 oz)   SpO2 98%   BMI 31.62 kg/m²       Physical Exam:    Gen: NAD, on NC 12 L/min  Heent:  MMM, NC, AT. Cor: s1s2 RRR. No MRG. PMI mid 5th intercostal space. Resp:  CTA b/l. No w/r/r. Nml effort and diaphragmatic excursion. Abd:  NT ND.  BS positive. No rebound or guarding. No masses. Ext: No edema or cyanosis. Intake and Output:  Current Shift:  No intake/output data recorded. Last three shifts:  No intake/output data recorded. Labs: Results:       Chemistry Recent Labs     11/07/21  1050 11/06/21 2330 11/06/21  0200   * 138* 140*   * 139 139   K 4.1 4.0 4.2    105 108   CO2 30 26 29   BUN 15 14 17   CREA 0.63 0.59* 0.67   CA 8.0* 7.9* 7.8*   AGAP 4 8 2*   BUCR 24* 24* 25*   AP 93 91 88   TP 5.8* 5.8* 5.8*   ALB 2.1* 2.1* 2.0*   GLOB 3.7 3.7 3.8   AGRAT 0.6* 0.6* 0.5*      CBC w/Diff Recent Labs     11/07/21  1050 11/06/21 2330 11/06/21  0200   WBC 11.2 12.9 12.1   RBC 3.85* 3.68* 3.71*   HGB 10.3* 9.7* 9.8*   HCT 33.5* 31.4* 31.6*    314 273   GRANS 82* 89* 81*   LYMPH 8* 5* 10*   EOS 0 0 0      Cardiac Enzymes Recent Labs     11/06/21  0200 11/05/21  0327   CPK 1,206* 2,179*      Coagulation No results for input(s): PTP, INR, APTT, INREXT, INREXT in the last 72 hours. Lipid Panel No results found for: CHOL, CHOLPOCT, CHOLX, CHLST, CHOLV, 216731, HDL, HDLP, LDL, LDLC, DLDLP, 941959, VLDLC, VLDL, TGLX, TRIGL, TRIGP, TGLPOCT, CHHD, CHHDX   BNP No results for input(s): BNPP in the last 72 hours.    Liver Enzymes Recent Labs     11/07/21  1050   TP 5.8*   ALB 2.1*   AP 93      Thyroid Studies No results found for: T4, T3U, TSH, TSHEXT, TSHEXT     Procedures/imaging: see electronic medical records for all procedures/Xrays and details which were not copied into this note but were reviewed prior to creation of Plan      Medications Reviewed  Jam rGissom MD

## 2021-11-08 LAB
ALBUMIN SERPL-MCNC: 2 G/DL (ref 3.4–5)
ALBUMIN/GLOB SERPL: 0.6 {RATIO} (ref 0.8–1.7)
ALP SERPL-CCNC: 88 U/L (ref 45–117)
ALT SERPL-CCNC: 73 U/L (ref 13–56)
ANION GAP SERPL CALC-SCNC: 5 MMOL/L (ref 3–18)
AST SERPL-CCNC: 83 U/L (ref 10–38)
BASOPHILS # BLD: 0 K/UL (ref 0–0.1)
BASOPHILS NFR BLD: 0 % (ref 0–2)
BILIRUB SERPL-MCNC: 0.9 MG/DL (ref 0.2–1)
BUN SERPL-MCNC: 14 MG/DL (ref 7–18)
BUN/CREAT SERPL: 23 (ref 12–20)
CALCIUM SERPL-MCNC: 8 MG/DL (ref 8.5–10.1)
CHLORIDE SERPL-SCNC: 101 MMOL/L (ref 100–111)
CO2 SERPL-SCNC: 30 MMOL/L (ref 21–32)
CREAT SERPL-MCNC: 0.6 MG/DL (ref 0.6–1.3)
CRP SERPL-MCNC: 6.8 MG/DL (ref 0–0.3)
DIFFERENTIAL METHOD BLD: ABNORMAL
EOSINOPHIL # BLD: 0 K/UL (ref 0–0.4)
EOSINOPHIL NFR BLD: 0 % (ref 0–5)
ERYTHROCYTE [DISTWIDTH] IN BLOOD BY AUTOMATED COUNT: 12.8 % (ref 11.6–14.5)
FERRITIN SERPL-MCNC: 1431 NG/ML (ref 8–388)
GLOBULIN SER CALC-MCNC: 3.6 G/DL (ref 2–4)
GLUCOSE BLD STRIP.AUTO-MCNC: 137 MG/DL (ref 70–110)
GLUCOSE BLD STRIP.AUTO-MCNC: 159 MG/DL (ref 70–110)
GLUCOSE BLD STRIP.AUTO-MCNC: 167 MG/DL (ref 70–110)
GLUCOSE BLD STRIP.AUTO-MCNC: 201 MG/DL (ref 70–110)
GLUCOSE SERPL-MCNC: 165 MG/DL (ref 74–99)
HCT VFR BLD AUTO: 32.4 % (ref 35–45)
HGB BLD-MCNC: 9.8 G/DL (ref 12–16)
LYMPHOCYTES # BLD: 1 K/UL (ref 0.9–3.6)
LYMPHOCYTES NFR BLD: 8 % (ref 21–52)
MCH RBC QN AUTO: 25.9 PG (ref 24–34)
MCHC RBC AUTO-ENTMCNC: 30.2 G/DL (ref 31–37)
MCV RBC AUTO: 85.7 FL (ref 78–100)
MONOCYTES # BLD: 0.9 K/UL (ref 0.05–1.2)
MONOCYTES NFR BLD: 7 % (ref 3–10)
NEUTS SEG # BLD: 10.5 K/UL (ref 1.8–8)
NEUTS SEG NFR BLD: 85 % (ref 40–73)
PLATELET # BLD AUTO: 360 K/UL (ref 135–420)
PLATELET COMMENTS,PCOM: ABNORMAL
PMV BLD AUTO: 10.7 FL (ref 9.2–11.8)
POTASSIUM SERPL-SCNC: 4.2 MMOL/L (ref 3.5–5.5)
PROT SERPL-MCNC: 5.6 G/DL (ref 6.4–8.2)
RBC # BLD AUTO: 3.78 M/UL (ref 4.2–5.3)
RBC MORPH BLD: ABNORMAL
SODIUM SERPL-SCNC: 136 MMOL/L (ref 136–145)
WBC # BLD AUTO: 12.4 K/UL (ref 4.6–13.2)

## 2021-11-08 PROCEDURE — 74011000258 HC RX REV CODE- 258: Performed by: INTERNAL MEDICINE

## 2021-11-08 PROCEDURE — 36415 COLL VENOUS BLD VENIPUNCTURE: CPT

## 2021-11-08 PROCEDURE — 74011000250 HC RX REV CODE- 250: Performed by: INTERNAL MEDICINE

## 2021-11-08 PROCEDURE — 74011250636 HC RX REV CODE- 250/636: Performed by: INTERNAL MEDICINE

## 2021-11-08 PROCEDURE — 74011250637 HC RX REV CODE- 250/637: Performed by: INTERNAL MEDICINE

## 2021-11-08 PROCEDURE — 74011250636 HC RX REV CODE- 250/636: Performed by: HOSPITALIST

## 2021-11-08 PROCEDURE — 86140 C-REACTIVE PROTEIN: CPT

## 2021-11-08 PROCEDURE — 82728 ASSAY OF FERRITIN: CPT

## 2021-11-08 PROCEDURE — 2709999900 HC NON-CHARGEABLE SUPPLY

## 2021-11-08 PROCEDURE — 97530 THERAPEUTIC ACTIVITIES: CPT

## 2021-11-08 PROCEDURE — 74011636637 HC RX REV CODE- 636/637: Performed by: INTERNAL MEDICINE

## 2021-11-08 PROCEDURE — 85025 COMPLETE CBC W/AUTO DIFF WBC: CPT

## 2021-11-08 PROCEDURE — 77010033711 HC HIGH FLOW OXYGEN

## 2021-11-08 PROCEDURE — 82962 GLUCOSE BLOOD TEST: CPT

## 2021-11-08 PROCEDURE — C9113 INJ PANTOPRAZOLE SODIUM, VIA: HCPCS | Performed by: INTERNAL MEDICINE

## 2021-11-08 PROCEDURE — 80053 COMPREHEN METABOLIC PANEL: CPT

## 2021-11-08 PROCEDURE — 65660000000 HC RM CCU STEPDOWN

## 2021-11-08 RX ORDER — PANTOPRAZOLE SODIUM 40 MG/1
40 TABLET, DELAYED RELEASE ORAL
Status: DISCONTINUED | OUTPATIENT
Start: 2021-11-09 | End: 2021-11-18 | Stop reason: HOSPADM

## 2021-11-08 RX ADMIN — REMDESIVIR 100 MG: 100 INJECTION, POWDER, LYOPHILIZED, FOR SOLUTION INTRAVENOUS at 01:00

## 2021-11-08 RX ADMIN — PANTOPRAZOLE SODIUM 40 MG: 40 INJECTION, POWDER, FOR SOLUTION INTRAVENOUS at 10:20

## 2021-11-08 RX ADMIN — ENOXAPARIN SODIUM 30 MG: 30 INJECTION SUBCUTANEOUS at 22:11

## 2021-11-08 RX ADMIN — Medication 2000 UNITS: at 10:19

## 2021-11-08 RX ADMIN — ZINC SULFATE 220 MG (50 MG) CAPSULE 1 CAPSULE: CAPSULE at 10:22

## 2021-11-08 RX ADMIN — DEXAMETHASONE SODIUM PHOSPHATE 10 MG: 4 INJECTION, SOLUTION INTRAMUSCULAR; INTRAVENOUS at 22:13

## 2021-11-08 RX ADMIN — Medication 500 MG: at 10:20

## 2021-11-08 RX ADMIN — INSULIN LISPRO 4 UNITS: 100 INJECTION, SOLUTION INTRAVENOUS; SUBCUTANEOUS at 12:38

## 2021-11-08 RX ADMIN — Medication 10 MG: at 22:11

## 2021-11-08 RX ADMIN — Medication 500 MG: at 22:14

## 2021-11-08 RX ADMIN — Medication 10 ML: at 06:00

## 2021-11-08 RX ADMIN — ENOXAPARIN SODIUM 30 MG: 30 INJECTION SUBCUTANEOUS at 10:22

## 2021-11-08 RX ADMIN — Medication 10 ML: at 22:14

## 2021-11-08 NOTE — PROGRESS NOTES
Hospitalist Progress Note    Patient: Rebecca Flores MRN: [id number]  CSN: [id number]    YOB: 1950  Age: 70 y.o. Sex: female    DOA: 11/3/2021 LOS:  LOS: 5 days          Chief Complaint:    covid      Assessment/Plan     Hypoxia 2nd to COVID PNA:   Now back to 6 liters  Repeat CTA chest neg for PE  No fevers  eating     Pneumonia due to Covid with hypoxia, fever, SOB: On IV Decadron. 5 day course of remdesivir completes today  02 support, wean as tolerated  duplex of lower extremity neg for DVT  lovenox BID for DVT prevention     Hyperglycemia/diabetes: SSI PRN     Hypertension: hydralazine as needed     Mild increase in liver enzymes, US w/o overt abnl, repeat CMP in am      Daily ferritin and fibrinogen as well as d dimer     Hypokalemia-resolved     Incentive spirometry     Continue inpatient care         Disposition :  Patient Active Problem List   Diagnosis Code    Acute respiratory failure due to COVID-19 ([facility] U07.1, J96.00    Pneumonia due to COVID-19 virus U07.1, J12.82    HTN (hypertension) I10    Diabetes mellitus type 2, controlled ([facility] E11.9       Subjective:    02 back down to 6 liters  No new issues per nurse  He speaks her language but indicates she may be a little confused this am  Although she ate breakfast, does not have any new signs for distress or SOB, sitting up on edge of bed, says \"im ok\"    Review of systems:    Limited by language barrier  Ipad  has limited ability for [place]  Will update son and see if he has any concerns from her also          Vital signs/Intake and Output:  Visit Vitals  /67   Pulse 87   Temp 98.5 °F (36.9 °C)   Resp 18   Ht 5' 3\" (1.6 m)   Wt 80.6 kg (177 lb 11.1 oz)   SpO2 100%   BMI 31.48 kg/m²     Current Shift:  No intake/output data recorded. Last three shifts:  No intake/output data recorded.     Exam:    General: elderly obese [place] female, NAD  Head/Neck: NCAT, supple, No masses, No lymphadenopathy  CVS:Regular rate and rhythm, no M/R/G, S1/S2 heard, no thrill  Lungs:Clear to auscultation bilaterally, no wheezes, rhonchi, or rales  Abdomen: Soft, Nontender, No distention, Normal Bowel sounds  Extremities: No C/C/E, pulses palpable 2+  Neuro:grossly normal , follows commands  Psych:appropriate                Labs: Results:       Chemistry Recent Labs     11/08/21 0449 11/07/21  1050 11/06/21  2330   * 215* 138*    135* 139   K 4.2 4.1 4.0    101 105   CO2 30 30 26   BUN 14 15 14   CREA 0.60 0.63 0.59*   CA 8.0* 8.0* 7.9*   AGAP 5 4 8   BUCR 23* 24* 24*   AP 88 93 91   TP 5.6* 5.8* 5.8*   ALB 2.0* 2.1* 2.1*   GLOB 3.6 3.7 3.7   AGRAT 0.6* 0.6* 0.6*      CBC w/Diff Recent Labs     11/08/21 0449 11/07/21  1050 11/06/21  2330   WBC 12.4 11.2 12.9   RBC 3.78* 3.85* 3.68*   HGB 9.8* 10.3* 9.7*   HCT 32.4* 33.5* 31.4*    342 314   GRANS 85* 82* 89*   LYMPH 8* 8* 5*   EOS 0 0 0      Cardiac Enzymes Recent Labs     11/06/21  0200   CPK 1,206*      Coagulation No results for input(s): PTP, INR, APTT, INREXT in the last 72 hours. Lipid Panel No results found for: CHOL, CHOLPOCT, CHOLX, CHLST, CHOLV, 536804, HDL, HDLP, LDL, LDLC, DLDLP, 505796, VLDLC, VLDL, TGLX, TRIGL, TRIGP, TGLPOCT, CHHD, CHHDX   BNP No results for input(s): BNPP in the last 72 hours.    Liver Enzymes Recent Labs     11/08/21 0449   TP 5.6*   ALB 2.0*   AP 88      Thyroid Studies No results found for: T4, T3U, TSH, TSHEXT     Procedures/imaging: see electronic medical records for all procedures/Xrays and details which were not copied into this note but were reviewed prior to creation of Zara Cole MD

## 2021-11-08 NOTE — PROGRESS NOTES
Pharmacy Dosing Services: IV - PO Conversion    This patient meets [address] [employer] approved criteria for conversion from IV to oral therapy for the following medication:  Pantoprazole      Pt has a regular diet and is taking other oral medications. Order adjusted to:  Pantoprazole 40 mg PO daily before breakfast.     (prior order:  Pantoprazole 40 mg IV daily)    Pharmacy  will continue to monitor the patient's status daily.    Signed YADIRA Rosario Contact information:  [phone]

## 2021-11-08 NOTE — PROGRESS NOTES
Chart reviewed, pt remains on high flow 02 , ID cont to follow, cm will also remain available for d/c needs, noted PT recommendation for home health services.

## 2021-11-08 NOTE — PROGRESS NOTES
[facility]  (A Division of [address] [place] [address])                                                                                                                    Ko Puga MD  Office #: - Option # [phone]  Fax #: [phone]     Date of Admission: 11/3/2021Date of Note: 11/8/2021  Reason for FU: Evaluation and antibiotic management of CPVID 19 infection  . Current Antimicrobials:    Prior Antimicrobials:    Remdesivir 11/3 to date#5  Dexamethasone 11/3 to date #5     NA       Assessment- ID related:  --------------------------------------------------------------------------  · COVId 19 infection  · Viral PNA- Bilateral- Patchy infiltrate by CXR and B/L multilobular GGO on CT  · Acute hypoxic respiratory failure PaO2 50 on ABG:  Had gotten worse over weekend, now improving  CTA chest 11/7- no new PE  · Rhabdo 2/2 viral infection with transaminitis: Improving  · CoNS bacteremia- likely contaminant    COVId rapid + 11/3  Influenza A antigen : negative  Procal X2 NL  DD elevated to 7.84-> 2.28  CRP 12.7-> 14.5-> 8.8  Ferretin 24452-> 2953    HIV 1/2- negative  Hepatitis C ab/S antigen -negative. TB serology pending  Improving bio-markers while worsening hypoxia overnight, need further evaluation. Other Medical Issues- Mx per respective team:  · HTN  · Hyperlipidemia  · DM     Recommendation for ID issues I am following:  --------------------------------------------------------------------------  -cont dexamethasone/ remdesivir #5 as per protocol      -> FU biomarkers and TB serology  -> Vit complex/weaning off O2 per primary    Called and DW with son- had DW her about proning thru  yesterday and asked he reminds her whenever he calls. Subjective:  Seen and examined, lying comfortably on her back. No fever   Her O2 supplement is coming down. Her repeat CT didn't show new PE or finding.   CRP/CPK/LFT/ DD coming down    Labs/notes reviewed        HPI:  Rebecca Flores is a 70 y.o. BLACK/ who is unvaccinated with PMH as listed below with limited communication due to [address] barrier. Admitted on 11/3 with hypoxia/symptoms of SOB and fever and body aches X4 days. Was hypoxic to 86% on RA-- apparently with muliplte family members sick with COVID 19 too. Found with fever, B/L lung infiltrate on CXR and CTA chest without PE, elevared D-dimer, high ferretin and CRP. Oxygen demand has been 4litres and is better down to 2 L at exam today. Currently on Remdesvir/dexamethasone. Belinda  [address] from admission 1 set + for [address]. Active Hospital Problems    Diagnosis Date Noted    Acute respiratory failure due to COVID-19 [facility]) 11/03/2021    Pneumonia due to COVID-19 virus 11/03/2021    HTN (hypertension) 11/03/2021    Diabetes mellitus type 2, controlled ([facility] 11/03/2021     Past Medical History:   Diagnosis Date    Diabetes ([facility]     HTN (hypertension)     Hyperlipidemia      History reviewed. No pertinent surgical history. Family History   Problem Relation Age of Onset    Hypertension Mother      Social History     Socioeconomic History    Marital status:      Spouse name: Not on file    Number of children: Not on file    Years of education: Not on file    Highest education level: Not on file   Occupational History    Not on file   Tobacco Use    Smoking status: Never Smoker    Smokeless tobacco: Never Used   Substance and Sexual Activity    Alcohol use: Yes    Drug use: Never    Sexual activity: Not Currently   Other Topics Concern    Not on file   Social History Narrative    Not on file     Social Determinants of Health     Financial Resource Strain:     Difficulty of Paying Living Expenses: Not on file   Food Insecurity:     Worried About Running Out of Food in the Last Year: Not on file    Aston of Food in the Last Year: Not on file   Transportation Needs:     Lack of Transportation (Medical):  Not on file    Lack of Transportation (Non-Medical): Not on file   Physical Activity:     Days of Exercise per Week: Not on file    Minutes of Exercise per Session: Not on file   Stress:     Feeling of Stress : Not on file   Social Connections:     Frequency of Communication with Friends and Family: Not on file    Frequency of Social Gatherings with Friends and Family: Not on file    Attends [employer] Services: Not on file    Active Member of [address] [place] [address] or Organizations: Not on file    Attends Club or Organization Meetings: Not on file    Marital Status: Not on file   Intimate Partner Violence:     Fear of Current or Ex-Partner: Not on file    Emotionally Abused: Not on file    Physically Abused: Not on file    Sexually Abused: Not on file   Housing Stability:     Unable to Pay for Housing in the Last Year: Not on file    Number of [place] in the Last Year: Not on file    Unstable Housing in the Last Year: Not on file       Allergies:  Patient has no known allergies.      Medications:  Current Facility-Administered Medications   Medication Dose Route Frequency    [START ON 11/9/2021] pantoprazole (PROTONIX) tablet 40 mg  40 mg Oral ACB    dexamethasone (DECADRON) 4 mg/mL injection 10 mg  10 mg IntraVENous Q24H    loperamide (IMODIUM) capsule 4 mg  4 mg Oral Q4H PRN    sodium chloride (NS) flush 5-10 mL  5-10 mL IntraVENous PRN    enoxaparin (LOVENOX) injection 30 mg  30 mg SubCUTAneous Q12H    acetaminophen (TYLENOL) tablet 650 mg  650 mg Oral Q6H PRN    Or    acetaminophen (TYLENOL) suppository 650 mg  650 mg Rectal Q6H PRN    guaiFENesin-dextromethorphan (ROBITUSSIN DM) 100-10 mg/5 mL syrup 5 mL  5 mL Oral Q4H PRN    cholecalciferol (VITAMIN D3) (1000 Units /25 mcg) tablet 2,000 Units  2,000 Units Oral DAILY    insulin lispro (HUMALOG) injection   SubCUTAneous AC&HS    glucose chewable tablet 16 g  4 Tablet Oral PRN    glucagon (GLUCAGEN) injection 1 mg  1 mg IntraMUSCular PRN    dextrose (D50W) injection syrg 12.5-25 g  25-50 mL IntraVENous PRN    sodium chloride (NS) flush 5-40 mL  5-40 mL IntraVENous Q8H    sodium chloride (NS) flush 5-40 mL  5-40 mL IntraVENous PRN    polyethylene glycol (MIRALAX) packet 17 g  17 g Oral DAILY PRN    ondansetron (ZOFRAN ODT) tablet 4 mg  4 mg Oral Q8H PRN    Or    ondansetron (ZOFRAN) injection 4 mg  4 mg IntraVENous Q6H PRN    zinc sulfate (ZINCATE) 50 mg zinc (220 mg) capsule 1 Capsule  1 Capsule Oral DAILY    ascorbic acid (vitamin C) (VITAMIN C) tablet 500 mg  500 mg Oral BID    melatonin (rapid dissolve) tablet 10 mg  10 mg Oral QHS    hydrALAZINE (APRESOLINE) 20 mg/mL injection 10 mg  10 mg IntraVENous Q6H PRN          Physical Exam:    Temp (24hrs), Av.1 °F (36.7 °C), Min:97.9 °F (36.6 °C), Max:98.5 °F (36.9 °C)    Visit Vitals  /67   Pulse 87   Temp 98.5 °F (36.9 °C)   Resp 18   Ht 5' 3\" (1.6 m)   Wt 80.6 kg (177 lb 11.1 oz)   SpO2 96%   BMI 31.48 kg/m²          GEN: WDWN, sitting up and eating. On 6L of oxygen via NC  HEENT: Unicteric. EOMI intact  CHEST: Non laboured breathing. CTA  CVS:RRR, no mur/gallop  ABD: Obese/soft. Non tender. PEPE: Deferred  EXT: No apparent swelling or redness on UE/LE joints. Skin: Dry and intact. No rash, no redness. CNS: A, OX3. Moves all extremity. CN grossly ok. Microbiology  All Micro Results     Procedure Component Value Units Date/Time    CULTURE, BLOOD [[id number]]  (Abnormal) Collected: 21 8160    Order Status: Completed Specimen: Blood Updated: 21 1227     Special Requests: NO SPECIAL REQUESTS        GRAM STAIN       AEROBIC BOTTLE GRAM POSITIVE COCCI IN CLUSTERS                  SMEAR CALLED TO AND CORRECTLY REPEATED BY: Khari Zaldivar RN 3N BY CAM ON 21 AT [address] [place] [address]. GRAM POSITIVE COCCI IN CLUSTERS ANAEROBIC BOTTLE                  SMEAR CALLED TO AND CORRECTLY REPEATED BY: Dacia Erazo RN 3N AT 1500 ON 2021 TO [facility].            Culture result:       STAPHYLOCOCCUS SPECIES, COAGULASE NEGATIVE MULTIPLE COLONY TYPES/STRAINS GROWING IN THE AEROBIC AND ANAEROBIC BOTTLES NO SITE INDICATED          LEGIONELLA PNEUMOPHILA AG, URINE [[id number]] Collected: 11/03/21 2045    Order Status: Canceled Specimen: Urine     STREP Clive Logan, URINE [[id number]] Collected: 11/03/21 2045    Order Status: Canceled Specimen: Urine     CULTURE, RESPIRATORY/SPUTUM/BRONCH Margurite Bill STAIN [[id number]] Collected: 11/03/21 2045    Order Status: Canceled Specimen: Sputum     INFLUENZA A & B AG (RAPID TEST) [[id number]] Collected: 11/03/21 1813    Order Status: Completed Specimen: Nasopharyngeal from Nasal washing Updated: 11/03/21 1843     Influenza A Antigen Negative        Comment: A negative result does not exclude influenza virus infection, seasonal or H1N1 due to suboptimal sensitivity. If influenza is circulating in your community, a diagnosis of influenza should be considered based on a patients clinical presentation and empiric antiviral treatment should be considered, if indicated. Influenza B Antigen Negative       COVID-19 RAPID TEST [[id number]]  (Abnormal) Collected: 11/03/21 1813    Order Status: Completed Specimen: Nasopharyngeal Updated: 11/03/21 1840     Specimen source Nasopharyngeal        COVID-19 rapid test Detected        Comment:      The specimen is POSITIVE for SARS-CoV-2, the novel coronavirus associated with COVID-19. This test has been authorized by the FDA under an Emergency Use Authorization (EUA) for use by authorized laboratories. Fact sheet for Healthcare Providers: ConventionUpdate.co.nz  Fact sheet for Patients: ConventionUpdate.co.nz       Methodology: Isothermal Nucleic Acid Amplification  CALLED TO AND CORRECTLY REPEATED BY:  Williams TAY IN ER  [phone] ON 11/03/2021 TO [facility].                   Lines / Catheters:  Lab results:    Chemistry  Recent Labs     11/08/21  0449 11/07/21  1050 11/06/21  2330   * 215* 138*  135* 139   K 4.2 4.1 4.0    101 105   CO2 30 30 26   BUN 14 15 14   CREA 0.60 0.63 0.59*   CA 8.0* 8.0* 7.9*   AGAP 5 4 8   BUCR 23* 24* 24*   AP 88 93 91   TP 5.6* 5.8* 5.8*   ALB 2.0* 2.1* 2.1*   GLOB 3.6 3.7 3.7   AGRAT 0.6* 0.6* 0.6*       CBC w/ Diff  Recent Labs     11/08/21  0449 11/07/21  1050 11/06/21  2330   WBC 12.4 11.2 12.9   RBC 3.78* 3.85* 3.68*   HGB 9.8* 10.3* 9.7*   HCT 32.4* 33.5* 31.4*    342 314   GRANS 85* 82* 89*   LYMPH 8* 8* 5*   EOS 0 0 0       Imaging: report posted below as per radiologist - reports reviewed in EPIC

## 2021-11-08 NOTE — PROGRESS NOTES
PT session held due to:  [x]  Increasing supplemental O2 needs (upcoming CT of chest)  [x]  RN Communication/ suggestion  []  Extreme Pain  []  Dialysis treatment in progress. Will f/u tomorrow, if cleared to resume. Thank you.   Tobi Cooper, PTA

## 2021-11-08 NOTE — DIABETES MGMT
Diabetes/ Glycemic Control Progress  Recommendations:   1. Patient may benefit from low-dose long acting insulin. Recommend 5 units Lantus q 24 hours. Assessment: Blood glucose levels continue to be outside of recommended range. Fasting blood glucose this morning 167 mg/dL. TDD insulin last 24 hours: 8 units correctional lispro      Steroids:   Rx Glucocorticoids (24h ago, onward)             Start     Dose Route Frequency Ordered Stop    11/04/21 2100  dexamethasone (DECADRON) 4 mg/mL injection 10 mg         10 mg IV EVERY 24 HOURS 11/04/21 0850 --                    Recent Glucose Results:   Lab Results   Component Value Date/Time     (H) 11/08/2021 04:49 AM    GLUCPOC 201 (H) 11/08/2021 12:04 PM    GLUCPOC 167 (H) 11/08/2021 06:29 AM    GLUCPOC 131 (H) 11/07/2021 05:44 PM       Within target range (70-180mg/dL): No  Current insulin orders: correctional lispro - normal corrective scale  Total Daily Dose previous 24 hours = 8 units corrective lispro    Plan/Goals:   Blood glucose will be within target of 70 - 180 mg/dl within 72 hours  Reinforce dietary and medication compliance at home.            Education:  [] Refer to Diabetes Education Record                       [] Education not indicated at this time     Kenneth Booker, [address]  Glycemic Control Team  [phone]

## 2021-11-08 NOTE — PROGRESS NOTES
Problem: Mobility Impaired (Adult and Pediatric)  Goal: *Acute Goals and Plan of Care (Insert Text)  Description: Initiated 11/6/2021 and to be accomplished within 7 day(s)  1. Patient will move from supine to sit and sit to supine  in bed with modified independence. 2.  Patient will transfer from bed to chair and chair to bed with modified independence using the least restrictive device. 3.  Patient will perform sit to stand with modified independence. 4.  Patient will ambulate with modified independence for 150 feet with the least restrictive device. 11/8/2021 1341 by Ana Lu PT  Outcome: Progressing Towards Goal  PHYSICAL THERAPY TREATMENT    Patient: Ester Cabello (86 y.o. female)  Date: 11/8/2021  Diagnosis: Pneumonia due to COVID-19 virus [U07.1, J12.82]  Acute respiratory failure due to COVID-19 ([facility] [address] [U07.1, J96.00]   Pneumonia due to COVID-19 virus       Precautions: Fall  PLOF: Pt lives with her children, independent with mobility per chart    ASSESSMENT:  Pt was sitting edge of bed on arrival attempting to untangle her telemetry monitor. Pt stood with CGA and marching in place approximately 30 seconds. Pt became visibly SOB and returned to sitting, her O2 sat was 83%. Pt returned to supine with CGA and then head of the bed elevated. After several minutes of resting patient's breathing relaxed and O2 sat was 94%. Pt was left in bed with needs in reach. Progression toward goals:     []      Improving appropriately and progressing toward goals  [x]      Improving slowly and progressing toward goals  []      Not making progress toward goals and plan of care will be adjusted     PLAN:  Patient continues to benefit from skilled intervention to address the above impairments. Continue treatment per established plan of care.   Discharge Recommendations:  Home Health with increased family support  Further Equipment Recommendations for Discharge:  TBD     SUBJECTIVE:   Patient stated thank you.   when being assisted to get comfortable in bed    OBJECTIVE DATA SUMMARY:   Critical Behavior:  Neurologic State: Alert  Orientation Level: Oriented X4     Safety/Judgement: Fall prevention  Functional Mobility Training:  Bed Mobility:  Sit to Supine: Contact guard assistance     Transfers:  Sit to Stand: Contact guard assistance  Stand to Sit: Contact guard assistance     Balance:  Sitting: Intact  Standing: Without support  Standing - Static: Fair  Standing - Dynamic : Fair      Therapeutic Exercises:         EXERCISE   Sets   Reps   Active Active Assist   Passive Self ROM   Comments   Ankle Pumps 1 10  [x] [] [] []    Quad Sets/Glut Sets    [] [] [] [] Hold for 5 secs   Hamstring Sets   [] [] [] []    Short Arc Quads   [] [] [] []    Heel Slides   [] [] [] []    Straight Leg Raises   [] [] [] []    Hip Add   [] [] [] [] Hold for 5 secs, w/ pillow squeeze   Long Arc Quads 1 10 [x] [] [] []    Seated Marching   [] [] [] []    Standing Marching   [] [] [] []       [] [] [] []        Pain:  Pain level pre-treatment: 0/10  Pain level post-treatment: 0/10   Pain Intervention(s): n/a    Activity Tolerance:   poor  Please refer to the flowsheet for vital signs taken during this treatment. After treatment:   [] Patient left in no apparent distress sitting up in chair  [x] Patient left in no apparent distress in bed  [x] Call bell left within reach  [] Nursing notified  [] Caregiver present  [] Bed alarm activated  [] SCDs applied      COMMUNICATION/EDUCATION:   [x]         Role of Physical Therapy in the acute care setting. [x]         Fall prevention education was provided and the patient/caregiver indicated understanding. [x]         Patient/family have participated as able in working toward goals and plan of care. [x]         Patient/family agree to work toward stated goals and plan of care. []         Patient understands intent and goals of therapy, but is neutral about his/her participation.   [] Patient is unable to participate in stated goals/plan of care: ongoing with therapy staff.  []         Other:        Khari Joseph, PT   Time Calculation: 19 mins

## 2021-11-09 LAB
ALBUMIN SERPL-MCNC: 2 G/DL (ref 3.4–5)
ALBUMIN/GLOB SERPL: 0.4 {RATIO} (ref 0.8–1.7)
ALP SERPL-CCNC: 96 U/L (ref 45–117)
ALT SERPL-CCNC: 63 U/L (ref 13–56)
ANION GAP SERPL CALC-SCNC: 3 MMOL/L (ref 3–18)
AST SERPL-CCNC: 51 U/L (ref 10–38)
BASOPHILS # BLD: 0 K/UL (ref 0–0.1)
BASOPHILS NFR BLD: 0 % (ref 0–2)
BILIRUB SERPL-MCNC: 0.7 MG/DL (ref 0.2–1)
BNP SERPL-MCNC: 91 PG/ML (ref 0–900)
BUN SERPL-MCNC: 13 MG/DL (ref 7–18)
BUN/CREAT SERPL: 21 (ref 12–20)
CALCIUM SERPL-MCNC: 8.4 MG/DL (ref 8.5–10.1)
CHLORIDE SERPL-SCNC: 102 MMOL/L (ref 100–111)
CO2 SERPL-SCNC: 30 MMOL/L (ref 21–32)
CREAT SERPL-MCNC: 0.62 MG/DL (ref 0.6–1.3)
DIFFERENTIAL METHOD BLD: ABNORMAL
EOSINOPHIL # BLD: 0 K/UL (ref 0–0.4)
EOSINOPHIL NFR BLD: 0 % (ref 0–5)
ERYTHROCYTE [DISTWIDTH] IN BLOOD BY AUTOMATED COUNT: 12.8 % (ref 11.6–14.5)
FERRITIN SERPL-MCNC: 1016 NG/ML (ref 8–388)
GLOBULIN SER CALC-MCNC: 4.5 G/DL (ref 2–4)
GLUCOSE BLD STRIP.AUTO-MCNC: 160 MG/DL (ref 70–110)
GLUCOSE BLD STRIP.AUTO-MCNC: 196 MG/DL (ref 70–110)
GLUCOSE BLD STRIP.AUTO-MCNC: 206 MG/DL (ref 70–110)
GLUCOSE SERPL-MCNC: 203 MG/DL (ref 74–99)
HBV CORE AB SERPL QL IA: NEGATIVE
HCT VFR BLD AUTO: 33.3 % (ref 35–45)
HGB BLD-MCNC: 10.1 G/DL (ref 12–16)
LYMPHOCYTES # BLD: 0.7 K/UL (ref 0.9–3.6)
LYMPHOCYTES NFR BLD: 6 % (ref 21–52)
M TB IFN-G BLD-IMP: NEGATIVE
MCH RBC QN AUTO: 26.3 PG (ref 24–34)
MCHC RBC AUTO-ENTMCNC: 30.3 G/DL (ref 31–37)
MCV RBC AUTO: 86.7 FL (ref 78–100)
MONOCYTES # BLD: 0.6 K/UL (ref 0.05–1.2)
MONOCYTES NFR BLD: 5 % (ref 3–10)
NEUTS SEG # BLD: 9.8 K/UL (ref 1.8–8)
NEUTS SEG NFR BLD: 89 % (ref 40–73)
PLATELET # BLD AUTO: 391 K/UL (ref 135–420)
PLATELET COMMENTS,PCOM: ABNORMAL
PMV BLD AUTO: 10.7 FL (ref 9.2–11.8)
POTASSIUM SERPL-SCNC: 4 MMOL/L (ref 3.5–5.5)
PROT SERPL-MCNC: 6.5 G/DL (ref 6.4–8.2)
QUANTIFERON CRITERIA, QFI1T: NORMAL
QUANTIFERON MITOGEN VALUE: >10 IU/ML
QUANTIFERON NIL VALUE: 0.01 IU/ML
QUANTIFERON TB1 AG: 0 IU/ML
QUANTIFERON TB2 AG: 0.01 IU/ML
RBC # BLD AUTO: 3.84 M/UL (ref 4.2–5.3)
RBC MORPH BLD: ABNORMAL
SODIUM SERPL-SCNC: 135 MMOL/L (ref 136–145)
WBC # BLD AUTO: 11.1 K/UL (ref 4.6–13.2)

## 2021-11-09 PROCEDURE — 74011636637 HC RX REV CODE- 636/637: Performed by: INTERNAL MEDICINE

## 2021-11-09 PROCEDURE — 65660000000 HC RM CCU STEPDOWN

## 2021-11-09 PROCEDURE — 77010033678 HC OXYGEN DAILY

## 2021-11-09 PROCEDURE — 82962 GLUCOSE BLOOD TEST: CPT

## 2021-11-09 PROCEDURE — 36415 COLL VENOUS BLD VENIPUNCTURE: CPT

## 2021-11-09 PROCEDURE — 74011250636 HC RX REV CODE- 250/636: Performed by: HOSPITALIST

## 2021-11-09 PROCEDURE — 80053 COMPREHEN METABOLIC PANEL: CPT

## 2021-11-09 PROCEDURE — 74011250637 HC RX REV CODE- 250/637: Performed by: INTERNAL MEDICINE

## 2021-11-09 PROCEDURE — 85025 COMPLETE CBC W/AUTO DIFF WBC: CPT

## 2021-11-09 PROCEDURE — 83880 ASSAY OF NATRIURETIC PEPTIDE: CPT

## 2021-11-09 PROCEDURE — 97116 GAIT TRAINING THERAPY: CPT

## 2021-11-09 PROCEDURE — 74011636637 HC RX REV CODE- 636/637: Performed by: HOSPITALIST

## 2021-11-09 PROCEDURE — 82728 ASSAY OF FERRITIN: CPT

## 2021-11-09 PROCEDURE — 97110 THERAPEUTIC EXERCISES: CPT

## 2021-11-09 PROCEDURE — 74011250636 HC RX REV CODE- 250/636: Performed by: INTERNAL MEDICINE

## 2021-11-09 PROCEDURE — 74011636637 HC RX REV CODE- 636/637

## 2021-11-09 RX ORDER — INSULIN GLARGINE 100 [IU]/ML
5 INJECTION, SOLUTION SUBCUTANEOUS
Status: DISCONTINUED | OUTPATIENT
Start: 2021-11-09 | End: 2021-11-10

## 2021-11-09 RX ADMIN — Medication 2000 UNITS: at 09:23

## 2021-11-09 RX ADMIN — INSULIN LISPRO 2 UNITS: 100 INJECTION, SOLUTION INTRAVENOUS; SUBCUTANEOUS at 12:24

## 2021-11-09 RX ADMIN — ZINC SULFATE 220 MG (50 MG) CAPSULE 1 CAPSULE: CAPSULE at 09:23

## 2021-11-09 RX ADMIN — INSULIN GLARGINE 5 UNITS: 100 INJECTION, SOLUTION SUBCUTANEOUS at 23:29

## 2021-11-09 RX ADMIN — ENOXAPARIN SODIUM 30 MG: 30 INJECTION SUBCUTANEOUS at 09:24

## 2021-11-09 RX ADMIN — DEXAMETHASONE SODIUM PHOSPHATE 10 MG: 4 INJECTION, SOLUTION INTRAMUSCULAR; INTRAVENOUS at 23:28

## 2021-11-09 RX ADMIN — Medication 10 ML: at 06:15

## 2021-11-09 RX ADMIN — Medication 500 MG: at 09:23

## 2021-11-09 RX ADMIN — PANTOPRAZOLE SODIUM 40 MG: 40 TABLET, DELAYED RELEASE ORAL at 06:38

## 2021-11-09 RX ADMIN — ENOXAPARIN SODIUM 30 MG: 30 INJECTION SUBCUTANEOUS at 23:29

## 2021-11-09 RX ADMIN — Medication 10 MG: at 23:27

## 2021-11-09 RX ADMIN — INSULIN LISPRO 4 UNITS: 100 INJECTION, SOLUTION INTRAVENOUS; SUBCUTANEOUS at 18:05

## 2021-11-09 RX ADMIN — Medication 500 MG: at 23:28

## 2021-11-09 RX ADMIN — INSULIN LISPRO 4 UNITS: 100 INJECTION, SOLUTION INTRAVENOUS; SUBCUTANEOUS at 23:00

## 2021-11-09 RX ADMIN — INSULIN LISPRO 2 UNITS: 100 INJECTION, SOLUTION INTRAVENOUS; SUBCUTANEOUS at 06:34

## 2021-11-09 NOTE — PROGRESS NOTES
Problem: Risk for Spread of Infection  Goal: Prevent transmission of infectious organism to others  Description: Prevent the transmission of infectious organisms to other patients, staff members, and visitors. Outcome: Progressing Towards Goal     Problem: Patient Education:  Go to Education Activity  Goal: Patient/Family Education  Outcome: Progressing Towards Goal     Problem: Airway Clearance - Ineffective  Goal: Achieve or maintain patent airway  Outcome: Progressing Towards Goal     Problem: Gas Exchange - Impaired  Goal: Absence of hypoxia  Outcome: Progressing Towards Goal  Goal: Promote optimal lung function  Outcome: Progressing Towards Goal     Problem: Breathing Pattern - Ineffective  Goal: Ability to achieve and maintain a regular respiratory rate  Outcome: Progressing Towards Goal     Problem:  Body Temperature -  Risk of, Imbalanced  Goal: Ability to maintain a body temperature within defined limits  Outcome: Progressing Towards Goal  Goal: Will regain or maintain usual level of consciousness  Outcome: Progressing Towards Goal  Goal: Complications related to the disease process, condition or treatment will be avoided or minimized  Outcome: Progressing Towards Goal     Problem: Isolation Precautions - Risk of Spread of Infection  Goal: Prevent transmission of infectious organism to others  Outcome: Progressing Towards Goal     Problem: Nutrition Deficits  Goal: Optimize nutrtional status  Outcome: Progressing Towards Goal     Problem: Risk for Fluid Volume Deficit  Goal: Maintain normal heart rhythm  Outcome: Progressing Towards Goal  Goal: Maintain absence of muscle cramping  Outcome: Progressing Towards Goal  Goal: Maintain normal serum potassium, sodium, calcium, phosphorus, and pH  Outcome: Progressing Towards Goal     Problem: Loneliness or Risk for Loneliness  Goal: Demonstrate positive use of time alone when socialization is not possible  Outcome: Progressing Towards Goal     Problem: Fatigue  Goal: Verbalize increase energy and improved vitality  Outcome: Progressing Towards Goal     Problem: Patient Education: Go to Patient Education Activity  Goal: Patient/Family Education  Outcome: Progressing Towards Goal     Problem: Falls - Risk of  Goal: *Absence of Falls  Description: Document [place] Fall Risk and appropriate interventions in the flowsheet.   Outcome: Progressing Towards Goal  Note: Fall Risk Interventions:  Mobility Interventions: Bed/chair exit alarm, Patient to call before getting OOB         Medication Interventions: Assess postural VS orthostatic hypotension, Teach patient to arise slowly, Patient to call before getting OOB    Elimination Interventions: Bed/chair exit alarm, Call light in reach, Patient to call for help with toileting needs              Problem: Patient Education: Go to Patient Education Activity  Goal: Patient/Family Education  Outcome: Progressing Towards Goal     Problem: Patient Education: Go to Patient Education Activity  Goal: Patient/Family Education  Outcome: Progressing Towards Goal

## 2021-11-09 NOTE — DIABETES MGMT
Diabetes/ Glycemic Control Plan of Care  Recommendations:   Recommend increasing Lantus to 8 units q 24 hrs. Assessment: Glucose remains outside of recommended range. Fasting  mg/dL. Patient may benefit from initiation of long acting insulin. Steroids:   Rx Glucocorticoids (24h ago, onward)             Start     Dose Route Frequency Ordered Stop    11/04/21 2100  dexamethasone (DECADRON) 4 mg/mL injection 10 mg         10 mg IV EVERY 24 HOURS 11/04/21 0850 --               Recent Glucose Results:   Lab Results   Component Value Date/Time     (H) 11/09/2021 04:40 AM    GLUCPOC 196 (H) 11/09/2021 05:59 AM    GLUCPOC 137 (H) 11/08/2021 10:20 PM    GLUCPOC 159 (H) 11/08/2021 05:00 PM       Within target range (70-180mg/dL): No  Current insulin orders: Corrective lispro - normal corrective scale    Plan/Goals:   Blood glucose will be within target of 70 - 180 mg/dl within 72 hours  Reinforce dietary and medication compliance at home.          Dario Welsh RD  Glycemic Control Team  [phone]

## 2021-11-09 NOTE — PROGRESS NOTES
[facility]  (A Division of [address] [place] [address])                                                                                                                    Lala Babb MD  Office #: - Option # [phone]  Fax #: [phone]     Date of Admission: 11/3/2021Date of Note: 11/9/2021  Reason for FU: Evaluation and antibiotic management of CPVID 19 infection  . Current Antimicrobials:    Prior Antimicrobials:  Dexamethasone 11/3 to date #5     Remdesivir 11/3 to date#5       Assessment- ID related:  --------------------------------------------------------------------------  · COVId 19 infection  · Viral PNA- Bilateral- Patchy infiltrate by CXR and B/L multilobular GGO on CT  · Acute hypoxic respiratory failure PaO2 50 on ABG:  Had gotten worse over weekend, now improving  CTA chest 11/7- no new PE  · Rhabdo 2/2 viral infection with transaminitis: Improving  · CoNS bacteremia- likely contaminant    COVId rapid + 11/3  Influenza A antigen : negative  Procal X2 NL  DD elevated to 7.84-> 2.28  CRP 12.7-> 14.5-> 8.8  Ferretin 01613-> 2953    HIV 1/2- negative  Hepatitis C ab/ Hep B -negative. TB serology: Negative    Improving bio-markers while worsening hypoxia overnight, need further evaluation.    Other Medical Issues- Mx per respective team:  · HTN  · Hyperlipidemia  · DM     Recommendation for ID issues I am following:  --------------------------------------------------------------------------  -cont dexamethasone      -> Biomarkers declining  --> Completed Remdesivir  -> Vit complex/weaning off O2 per primary    DW son to remind her to prone everytime he calls( limited communication)    No additional meds to add at this point ID wise, wean off oxygen as able for DC-> sats % on telemonitor             Subjective:  Afebrile, WBC OK, biomarkers decline  NO acute issue overnight  Sitting, appears comfortable  Limited communication with pt  CRP/CPK/LFT/ DD coming down    Labs/notes reviewed        HPI:  Brayden Vilchis is a 70 y.o. BLACK/ who is unvaccinated with PMH as listed below with limited communication due to [address] barrier. Admitted on 11/3 with hypoxia/symptoms of SOB and fever and body aches X4 days. Was hypoxic to 86% on RA-- apparently with muliplte family members sick with COVID 19 too. Found with fever, B/L lung infiltrate on CXR and CTA chest without PE, elevared D-dimer, high ferretin and CRP. Oxygen demand has been 4litres and is better down to 2 L at exam today. Currently on Remdesvir/dexamethasone. uTest [address] from admission 1 set + for [address]. Active Hospital Problems    Diagnosis Date Noted    Acute respiratory failure due to COVID-19 [facility]) 11/03/2021    Pneumonia due to COVID-19 virus 11/03/2021    HTN (hypertension) 11/03/2021    Diabetes mellitus type 2, controlled ([facility] [address] 11/03/2021     Past Medical History:   Diagnosis Date    Diabetes ([facility] [address]     HTN (hypertension)     Hyperlipidemia      History reviewed. No pertinent surgical history. Family History   Problem Relation Age of Onset    Hypertension Mother      Social History     Socioeconomic History    Marital status:      Spouse name: Not on file    Number of children: Not on file    Years of education: Not on file    Highest education level: Not on file   Occupational History    Not on file   Tobacco Use    Smoking status: Never Smoker    Smokeless tobacco: Never Used   Substance and Sexual Activity    Alcohol use:  Yes    Drug use: Never    Sexual activity: Not Currently   Other Topics Concern    Not on file   Social History Narrative    Not on file     Social Determinants of Health     Financial Resource Strain:     Difficulty of Paying Living Expenses: Not on file   Food Insecurity:     Worried About Running Out of Food in the Last Year: Not on file    Aston of Food in the Last Year: Not on file   Transportation Needs:     Lack of Transportation (Medical): Not on file    Lack of Transportation (Non-Medical): Not on file   Physical Activity:     Days of Exercise per Week: Not on file    Minutes of Exercise per Session: Not on file   Stress:     Feeling of Stress : Not on file   Social Connections:     Frequency of Communication with Friends and Family: Not on file    Frequency of Social Gatherings with Friends and Family: Not on file    Attends Restoration Services: Not on file    Active Member of [address] [place] [address] or Organizations: Not on file    Attends Club or Organization Meetings: Not on file    Marital Status: Not on file   Intimate Partner Violence:     Fear of Current or Ex-Partner: Not on file    Emotionally Abused: Not on file    Physically Abused: Not on file    Sexually Abused: Not on file   Housing Stability:     Unable to Pay for Housing in the Last Year: Not on file    Number of [place] in the Last Year: Not on file    Unstable Housing in the Last Year: Not on file       Allergies:  Patient has no known allergies.      Medications:  Current Facility-Administered Medications   Medication Dose Route Frequency    pantoprazole (PROTONIX) tablet 40 mg  40 mg Oral ACB    dexamethasone (DECADRON) 4 mg/mL injection 10 mg  10 mg IntraVENous Q24H    loperamide (IMODIUM) capsule 4 mg  4 mg Oral Q4H PRN    sodium chloride (NS) flush 5-10 mL  5-10 mL IntraVENous PRN    enoxaparin (LOVENOX) injection 30 mg  30 mg SubCUTAneous Q12H    acetaminophen (TYLENOL) tablet 650 mg  650 mg Oral Q6H PRN    Or    acetaminophen (TYLENOL) suppository 650 mg  650 mg Rectal Q6H PRN    guaiFENesin-dextromethorphan (ROBITUSSIN DM) 100-10 mg/5 mL syrup 5 mL  5 mL Oral Q4H PRN    cholecalciferol (VITAMIN D3) (1000 Units /25 mcg) tablet 2,000 Units  2,000 Units Oral DAILY    insulin lispro (HUMALOG) injection   SubCUTAneous AC&HS    glucose chewable tablet 16 g  4 Tablet Oral PRN    glucagon (GLUCAGEN) injection 1 mg  1 mg IntraMUSCular PRN    dextrose (D50W) injection syrg 12.5-25 g  25-50 mL IntraVENous PRN    sodium chloride (NS) flush 5-40 mL  5-40 mL IntraVENous Q8H    sodium chloride (NS) flush 5-40 mL  5-40 mL IntraVENous PRN    polyethylene glycol (MIRALAX) packet 17 g  17 g Oral DAILY PRN    ondansetron (ZOFRAN ODT) tablet 4 mg  4 mg Oral Q8H PRN    Or    ondansetron (ZOFRAN) injection 4 mg  4 mg IntraVENous Q6H PRN    zinc sulfate (ZINCATE) 50 mg zinc (220 mg) capsule 1 Capsule  1 Capsule Oral DAILY    ascorbic acid (vitamin C) (VITAMIN C) tablet 500 mg  500 mg Oral BID    melatonin (rapid dissolve) tablet 10 mg  10 mg Oral QHS    hydrALAZINE (APRESOLINE) 20 mg/mL injection 10 mg  10 mg IntraVENous Q6H PRN          Physical Exam:    Temp (24hrs), Av °F (36.7 °C), Min:97.5 °F (36.4 °C), Max:98.5 °F (36.9 °C)    Visit Vitals  /68 (BP 1 Location: Left upper arm, BP Patient Position: At rest)   Pulse 84   Temp 97.5 °F (36.4 °C)   Resp 20   Ht 5' 3\" (1.6 m)   Wt 80.6 kg (177 lb 11.1 oz)   SpO2 90%   BMI 31.48 kg/m²          GEN: WDWN, sitting up and eating. On 6L of oxygen via NC  HEENT: Unicteric. EOMI intact  CHEST: Non laboured breathing. ABD: Obese/soft. Non tender. PEPE: Deferred  EXT: No apparent swelling or redness on UE/LE joints. Skin: Dry and intact. No rash, no redness. CNS: A, OX3. Moves all extremity. CN grossly ok. Microbiology  All Micro Results     Procedure Component Value Units Date/Time    CULTURE, BLOOD [[id number]]  (Abnormal) Collected: 21 6193    Order Status: Completed Specimen: Blood Updated: 21 1227     Special Requests: NO SPECIAL REQUESTS        GRAM STAIN       AEROBIC BOTTLE GRAM POSITIVE COCCI IN CLUSTERS                  SMEAR CALLED TO AND CORRECTLY REPEATED BY: Estela Oseguera RN 3N BY CAM ON 21 AT [address] [place] [address]. GRAM POSITIVE COCCI IN CLUSTERS ANAEROBIC BOTTLE                  SMEAR CALLED TO AND CORRECTLY REPEATED BY: Manpreet Whyte RN 3N AT 1500 ON 2021 TO [place]. Culture result:       STAPHYLOCOCCUS SPECIES, COAGULASE NEGATIVE MULTIPLE COLONY TYPES/STRAINS GROWING IN THE AEROBIC AND ANAEROBIC BOTTLES NO SITE INDICATED          LEGIONELLA PNEUMOPHILA AG, URINE [[id number]] Collected: 11/03/21 2045    Order Status: Canceled Specimen: Urine     STREP Ravindra Anderson, URINE [[id number]] Collected: 11/03/21 2045    Order Status: Canceled Specimen: Urine     CULTURE, RESPIRATORY/SPUTUM/BRONCH Mliss Bennett STAIN [[id number]] Collected: 11/03/21 2045    Order Status: Canceled Specimen: Sputum     INFLUENZA A & B AG (RAPID TEST) [[id number]] Collected: 11/03/21 1813    Order Status: Completed Specimen: Nasopharyngeal from Nasal washing Updated: 11/03/21 1843     Influenza A Antigen Negative        Comment: A negative result does not exclude influenza virus infection, seasonal or H1N1 due to suboptimal sensitivity. If influenza is circulating in your community, a diagnosis of influenza should be considered based on a patients clinical presentation and empiric antiviral treatment should be considered, if indicated. Influenza B Antigen Negative       COVID-19 RAPID TEST [[id number]]  (Abnormal) Collected: 11/03/21 1813    Order Status: Completed Specimen: Nasopharyngeal Updated: 11/03/21 1840     Specimen source Nasopharyngeal        COVID-19 rapid test Detected        Comment:      The specimen is POSITIVE for SARS-CoV-2, the novel coronavirus associated with COVID-19. This test has been authorized by the FDA under an Emergency Use Authorization (EUA) for use by authorized laboratories. Fact sheet for Healthcare Providers: ConventionUpdate.co.nz  Fact sheet for Patients: ConventionUpdate.co.nz       Methodology: Isothermal Nucleic Acid Amplification  CALLED TO AND CORRECTLY REPEATED BY:  Jaden TAY IN ER  [place] ON 11/03/2021 TO [facility].                   Lines / Catheters:  Lab results:    Chemistry  Recent Labs     11/09/21  6382 11/08/21 0449 11/07/21  1050   * 165* 215*   * 136 135*   K 4.0 4.2 4.1    101 101   CO2 30 30 30   BUN 13 14 15   CREA 0.62 0.60 0.63   CA 8.4* 8.0* 8.0*   AGAP 3 5 4   BUCR 21* 23* 24*   AP 96 88 93   TP 6.5 5.6* 5.8*   ALB 2.0* 2.0* 2.1*   GLOB 4.5* 3.6 3.7   AGRAT 0.4* 0.6* 0.6*       CBC w/ Diff  Recent Labs     11/09/21 0440 11/08/21 0449 11/07/21  1050   WBC 11.1 12.4 11.2   RBC 3.84* 3.78* 3.85*   HGB 10.1* 9.8* 10.3*   HCT 33.3* 32.4* 33.5*    360 342   GRANS 89* 85* 82*   LYMPH 6* 8* 8*   EOS 0 0 0       Imaging: report posted below as per radiologist - reports reviewed in EPIC

## 2021-11-09 NOTE — PROGRESS NOTES
Hospitalist Progress Note-critical care note     Patient: Chely Flood MRN: [id number]  CSN: [id number]    YOB: 1950  Age: 70 y.o. Sex: female    DOA: 11/3/2021 LOS:  LOS: 6 days            Chief complaint: covid 19 pnam, htn . DM     Assessment/Plan         Hospital Problems  Date Reviewed: 11/3/2021          Codes Class Noted POA    Acute respiratory failure due to COVID-19 [facility]) ICD-10-CM: U07.1, J96.00  ICD-9-CM: 518.81, 079.89  11/3/2021 Unknown        * (Principal) Pneumonia due to COVID-19 virus ICD-10-CM: U07.1, J12.82  ICD-9-CM: 480.8, 079.89  11/3/2021 Unknown        HTN (hypertension) ICD-10-CM: I10  ICD-9-CM: 401.9  11/3/2021 Unknown        Diabetes mellitus type 2, controlled ([facility] ICD-10-CM: E11.9  ICD-9-CM: 250.00  11/3/2021 Unknown               Hypoxia 2nd to COVID PNA:   Improving per oxygen level ,down to 4 L   Repeat CTA chest neg for PE on 11/7,        Pneumonia due to Covid with hypoxia,  Continue iv decadrone 5 day   Completed remdesivir  Continue supportive treatment   duplex of lower extremity neg for DVT  lovenox BID for DVT prevention  Ferritin trending down      Hyperglycemia/diabetes:   Add lantus 5 SSI PRN     Hypertension  Continue hydralazine as needed     Mild increase in liver enzymes,   Mild improving          Hypokalemia-resolved     Pt was seen and examed in full PPE   35 total min's spent on patient care including >50% on counseling/coordinating care. Discussed the above assessments.  also discussed labs, medications and hospital course    Pt looks comfortable on current oxygen level, not in distress   Disposition :tbd,   Review of systems:  Limited due to  language barrier  Live video  has limited ability for [address] [place] [address] signs/Intake and Output:  Visit Vitals  /68 (BP 1 Location: Left upper arm, BP Patient Position: At rest)   Pulse 84   Temp 97.5 °F (36.4 °C)   Resp 20   Ht 5' 3\" (1.6 m)   Wt 80.6 kg (177 lb 11.1 oz)   SpO2 90% BMI 31.48 kg/m²     Current Shift:  No intake/output data recorded. Last three shifts:  No intake/output data recorded. Physical Exam:  General: WD, WN. Alert, cooperative, no acute distress    HEENT: NC, Atraumatic. PERRLA, anicteric sclerae. Lungs: CTA Bilaterally. No Wheezing/Rhonchi/Rales. Heart:  Regular  rhythm,  No murmur, No Rubs, No Gallops  Abdomen: Soft, Non distended, Non tender. +Bowel sounds,   Extremities: No c/c/e  Psych:   Not anxious or agitated. Neurologic:  No acute neurological deficit. Labs: Results:       Chemistry Recent Labs     11/09/21 0440 11/08/21 0449 11/07/21  1050   * 165* 215*   * 136 135*   K 4.0 4.2 4.1    101 101   CO2 30 30 30   BUN 13 14 15   CREA 0.62 0.60 0.63   CA 8.4* 8.0* 8.0*   AGAP 3 5 4   BUCR 21* 23* 24*   AP 96 88 93   TP 6.5 5.6* 5.8*   ALB 2.0* 2.0* 2.1*   GLOB 4.5* 3.6 3.7   AGRAT 0.4* 0.6* 0.6*      CBC w/Diff Recent Labs     11/09/21 0440 11/08/21 0449 11/07/21  1050   WBC 11.1 12.4 11.2   RBC 3.84* 3.78* 3.85*   HGB 10.1* 9.8* 10.3*   HCT 33.3* 32.4* 33.5*    360 342   GRANS 89* 85* 82*   LYMPH 6* 8* 8*   EOS 0 0 0      Cardiac Enzymes No results for input(s): CPK, CKND1, ROSINA in the last 72 hours. No lab exists for component: CKRMB, TROIP   Coagulation No results for input(s): PTP, INR, APTT, INREXT in the last 72 hours. Lipid Panel No results found for: CHOL, CHOLPOCT, CHOLX, CHLST, CHOLV, 954519, HDL, HDLP, LDL, LDLC, DLDLP, 109678, VLDLC, VLDL, TGLX, TRIGL, TRIGP, TGLPOCT, CHHD, CHHDX   BNP No results for input(s): BNPP in the last 72 hours.    Liver Enzymes Recent Labs     11/09/21  0440   TP 6.5   ALB 2.0*   AP 96      Thyroid Studies No results found for: T4, T3U, TSH, TSHEXT     Procedures/imaging: see electronic medical records for all procedures/Xrays and details which were not copied into this note but were reviewed prior to creation of Plan    CTA CHEST W OR W WO CONT    Result Date: 11/8/2021  EXAM: CTA chest INDICATION: Pain. COMPARISON: November 3, 2021. TECHNIQUE: Axial CT imaging from the thoracic inlet through the diaphragm with intravenous contrast. Coronal and sagittal MIP reformats were generated. Dose reduction techniques used: automated exposure control, adjustment of the mAs and/or kVp according to patient size, and iterative reconstruction techniques. Digital imaging and communications in Medicine (DICOM) format image data are available to nonaffiliated external healthcare facilities or entities on a secure, media free, reciprocally searchable basis with patient authorization for at least 12 months after this study. _______________ FINDINGS: EXAM QUALITY: Adequate. PULMONARY ARTERIES: No evidence of pulmonary embolism. MEDIASTINUM: Normal heart size. No evidence of right heart strain. Aorta is unremarkable. No pericardial effusion. LUNGS: There are again seen numerous bilateral multilobar groundglass infiltrates. PLEURA: There are small bilateral pleural effusions. AIRWAY: Normal. LYMPH NODES: No enlarged nodes. UPPER ABDOMEN: Unremarkable. OTHER: No acute or aggressive osseous abnormalities identified. _______________     1. No evidence of pulmonary embolism. 2.  Numerous bilateral multilobar groundglass infiltrates again seen. 3. Small bilateral pleural effusions. CTA CHEST W OR W WO CONT    Result Date: 11/3/2021  EXAM: CTA chest CLINICAL INDICATION/HISTORY: COVID pneumonia, shortness of breath COMPARISON: Chest radiograph from the same day TECHNIQUE: Axial CT imaging from the thoracic inlet through the diaphragm with intravenous contrast. Coronal and sagittal MIP reformats were generated. One or more dose reduction techniques were used on this CT: automated exposure control, adjustment of the mAs and/or kVp according to patient size, and iterative reconstruction techniques.   The specific techniques used on this CT exam have been documented in the patient's electronic medical record. Digital Imaging and Communications in Medicine (DICOM) format image data are available to nonaffiliated external healthcare facilities or entities on a secured, media free, reciprocally searchable basis with patient authorization for at least a 12-month period after this study. _______________ FINDINGS: EXAM QUALITY: Quit PULMONARY ARTERIES: No pulmonary embolism identified. MEDIASTINUM: Normal heart size. Aorta is unremarkable. No pericardial effusion. LUNGS: Bilateral multilobar groundglass infiltrates secondary to COVID 19 pneumonia. AIRWAY: Normal. PLEURA: Normal. LYMPH NODES: No enlarged nodes. UPPER ABDOMEN: Unremarkable. BONES: No acute or aggressive osseous abnormalities identified. OTHER: None. _______________     1. No pulmonary embolism identified. 2. Bilateral multilobar groundglass infiltrates secondary to COVID 19 pneumonia.  ABD LTD    Result Date: 11/4/2021  EXAM: Limited right upper quadrant abdominal ultrasound INDICATION: Elevated liver function tests. COMPARISON: CT angiogram of the chest 11/3/2021. TECHNIQUE: Real-time abdomen/right upper quadrant sonography in multiple planes was performed with image documentation. Grayscale, color flow Doppler imaging, and velocity spectral waveform analysis of the portal vein was performed (duplex imaging). _______________ FINDINGS: LIVER: Mildly increased hepatic parenchymal echogenicity is present. No focal mass Color flow Doppler and velocity spectral waveform analysis of the portal vein shows normal (hepatopedal) direction of flow. [place] [employer] BILIARY SYSTEM: No intrahepatic biliary dilatation. Common bile duct is normal in caliber measuring 0.4 cm. GALLBLADDER: No gallstones or gallbladder wall thickening. No pericholecystic fluid. RIGHT KIDNEY: 8.6 cm in length. No hydronephrosis or renal mass. No visible calculi. PANCREAS: Head and body are unremarkable in appearance though the tail is obscured by overlying bowel gas.  IVC: Visualized portions are unremarkable in appearance. OTHER: No free intraperitoneal fluid. _______________     Mildly increased hepatic parenchymal echotexture which can be seen in the context of steatosis and/or hepatocellular dysfunction. No biliary ductal dilatation or mass lesion. XR CHEST PORT    Result Date: 11/7/2021  EXAM: XR CHEST PORT CLINICAL INDICATION/HISTORY: Worsening hypoxia   > Additional: None. COMPARISON: November 3, 2021 TECHNIQUE: Portable chest _______________ FINDINGS: SUPPORT DEVICES: None. HEART AND MEDIASTINUM: The heart is stable in size and contour. LUNGS AND PLEURAL SPACES: Extensive multifocal airspace opacities and consolidation are seen diffusely throughout the lungs little interval change. BONY THORAX AND SOFT TISSUES: No acute osseous abnormality. _______________     Extensive multifocal airspace opacities similar to prior study. XR CHEST PORT    Result Date: 11/3/2021  EXAM: CHEST RADIOGRAPH CLINICAL INDICATION/HISTORY: meets SIRS criteria   > Additional: Short of breath and cough COMPARISON: None TECHNIQUE: Portable frontal view of the chest _______________ FINDINGS: SUPPORT DEVICES: None. HEART AND MEDIASTINUM: Heart size is normal. LUNGS AND PLEURAL SPACES: Patchy bilateral consolidations. No pleural effusion or pneumothorax. BONES AND SOFT TISSUES: Unremarkable. _______________     Patchy bilateral consolidations could relate to COVID 19 pneumonia. ECHO ADULT COMPLETE    Result Date: 11/4/2021  · Left Ventricle: Normal cavity size, wall thickness and systolic function (ejection fraction normal). The estimated EF is 60 - 65%. There is mild (grade 1) left ventricular diastolic dysfunction. Wall Scoring: The left ventricular wall motion is normal. · Tricuspid Valve: Normal valve structure and no stenosis. Tricuspid regurgitation is inadequate for estimation of right ventricular systolic pressure.       DUPLEX LOWER EXT VENOUS BILAT    Result Date: 11/5/2021  · No evidence of deep vein thrombosis in the right lower extremity. · No evidence of deep vein thrombosis in the left lower extremity.         Carol Toribio MD

## 2021-11-09 NOTE — PROGRESS NOTES
DC Plan: home with [facility] when medically stable    Patient is still requiring 6L NC; care manager will continue to monitor and assist with home needs as O2 use decreases. Care Management Interventions  PCP Verified by CM:  Yes  Last Visit to PCP: 11/03/21  Mode of Transport at Discharge: Self  Transition of Care Consult (CM Consult): Discharge Planning  Support Systems: Child(ama)  Confirm Follow Up Transport: Family  The Plan for Transition of Care is Related to the Following Treatment Goals : covid 19 pneumonia  The Patient and/or Patient Representative was Provided with a Choice of Provider and Agrees with the Discharge Plan?: Yes  Name of the Patient Representative Who was Provided with a Choice of Provider and Agrees with the Discharge Plan: Vangie Robison, son  Discharge Location  Discharge Placement: Home with family assistance

## 2021-11-09 NOTE — ROUTINE PROCESS
Bedside and Verbal shift change report given to Marek Ferreira RN (oncoming nurse) by Cynthia Sandoval RN (offgoing nurse). Report included the following information SBAR, Kardex, ED Summary, Intake/Output, MAR, Recent Results, Med Rec Status and Cardiac Rhythm NSR.     1940 Patient assessment complete. Patient is resting quietly with eyes wide open and chest rising and falling evenly. No signs of pain or complaints of discomfort. Call bell is within reach. Shift was uneventful. Bedside and Verbal shift change report given to Cynthia Sandoval RN (oncoming nurse) by Chun Núñez RN (offgoing nurse). Report included the following information SBAR, Kardex, ED Summary, Intake/Output, MAR, Recent Results, Med Rec Status and Cardiac Rhythm NSR.

## 2021-11-09 NOTE — PROGRESS NOTES
Problem: Mobility Impaired (Adult and Pediatric)  Goal: *Acute Goals and Plan of Care (Insert Text)  Description: Initiated 11/6/2021 and to be accomplished within 7 day(s)  1. Patient will move from supine to sit and sit to supine  in bed with modified independence. 2.  Patient will transfer from bed to chair and chair to bed with modified independence using the least restrictive device. 3.  Patient will perform sit to stand with modified independence. 4.  Patient will ambulate with modified independence for 150 feet with the least restrictive device. Outcome: Progressing Towards Goal   PHYSICAL THERAPY TREATMENT    Patient: Twyla Carey (73 y.o. female)  Date: 11/9/2021  Diagnosis: Pneumonia due to COVID-19 virus [U07.1, J12.82]  [facility] [U07.1, J96.00]   Pneumonia due to COVID-19 virus       Precautions: Fall  PLOF: lives with family     ASSESSMENT:  Pt was supine in bed on arrival and agreeable to getting out of bed to walk. Pt transferred supine to sit with SBA. Patient's O2 sat was 94% sitting edge of bed. Pt stood with CGA and ambulated 16 feet with RW and CGA/SBA. After ambulation patients O2 sat was 84% and returned to >90% in approximately 2 minutes. Pt then performed exercises as noted below. Pt was left in bed with needs in reach. Progression toward goals:   []      Improving appropriately and progressing toward goals  [x]      Improving slowly and progressing toward goals  []      Not making progress toward goals and plan of care will be adjusted     PLAN:  Patient continues to benefit from skilled intervention to address the above impairments. Continue treatment per established plan of care. Discharge Recommendations:  Home Health with increased family support  Further Equipment Recommendations for Discharge:  rolling walker     SUBJECTIVE:   Patient stated I'm ok.     OBJECTIVE DATA SUMMARY:   Critical Behavior:  Calm and cooperative  Functional Mobility Training:  Bed Mobility:  Supine to Sit: Stand-by assistance  Sit to Supine: Stand-by assistance  Transfers:  Sit to Stand: Contact guard assistance  Stand to Sit: Contact guard assistance     Balance:  Sitting: Intact  Standing: With support  Standing - Static: Good  Standing - Dynamic :  (fair+)   Ambulation/Gait Training:  Distance (ft): 16 Feet (ft)  Assistive Device: Walker, rolling  Ambulation - Level of Assistance: Contact guard assistance; Stand-by assistance  Gait Abnormalities: Decreased step clearance  Base of Support: Widened  Therapeutic Exercises:         EXERCISE   Sets   Reps   Active Active Assist   Passive Self ROM   Comments   Ankle Pumps 1 10  [x] [] [] []    Quad Sets/Glut Sets    [] [] [] [] Hold for 5 secs   Hamstring Sets   [] [] [] []    Short Arc Quads   [] [] [] []    Heel Slides   [] [] [] []    Straight Leg Raises   [] [] [] []    Hip Add   [] [] [] [] Hold for 5 secs, w/ pillow squeeze   Long Arc Quads 1 10 [x] [] [] []    Seated Marching 1 10 [x] [] [] []    Standing Marching   [] [] [] []       [] [] [] []        Pain:  Pain level pre-treatment: 0/10  Pain level post-treatment: 0/10   Pain Intervention(s): n/a    Activity Tolerance:   Poor+  Please refer to the flowsheet for vital signs taken during this treatment. After treatment:   [] Patient left in no apparent distress sitting up in chair  [x] Patient left in no apparent distress in bed  [x] Call bell left within reach  [] Nursing notified  [] Caregiver present  [] Bed alarm activated  [] SCDs applied      COMMUNICATION/EDUCATION:   [x]         Role of Physical Therapy in the acute care setting. [x]         Fall prevention education was provided and the patient/caregiver indicated understanding. [x]         Patient/family have participated as able in working toward goals and plan of care. [x]         Patient/family agree to work toward stated goals and plan of care.   []         Patient understands intent and goals of therapy, but is neutral about his/her participation.   []         Patient is unable to participate in stated goals/plan of care: ongoing with therapy staff.  []         Other:        Andrei Neri, PT   Time Calculation: 27 mins

## 2021-11-10 LAB
ALBUMIN SERPL-MCNC: 2 G/DL (ref 3.4–5)
ALBUMIN/GLOB SERPL: 0.5 {RATIO} (ref 0.8–1.7)
ALP SERPL-CCNC: 102 U/L (ref 45–117)
ALT SERPL-CCNC: 53 U/L (ref 13–56)
ANION GAP SERPL CALC-SCNC: 4 MMOL/L (ref 3–18)
AST SERPL-CCNC: 37 U/L (ref 10–38)
BASOPHILS # BLD: 0 K/UL (ref 0–0.1)
BASOPHILS NFR BLD: 0 % (ref 0–2)
BILIRUB SERPL-MCNC: 0.6 MG/DL (ref 0.2–1)
BUN SERPL-MCNC: 16 MG/DL (ref 7–18)
BUN/CREAT SERPL: 24 (ref 12–20)
CALCIUM SERPL-MCNC: 8.2 MG/DL (ref 8.5–10.1)
CHLORIDE SERPL-SCNC: 101 MMOL/L (ref 100–111)
CO2 SERPL-SCNC: 31 MMOL/L (ref 21–32)
CREAT SERPL-MCNC: 0.68 MG/DL (ref 0.6–1.3)
DIFFERENTIAL METHOD BLD: ABNORMAL
EOSINOPHIL # BLD: 0 K/UL (ref 0–0.4)
EOSINOPHIL NFR BLD: 0 % (ref 0–5)
ERYTHROCYTE [DISTWIDTH] IN BLOOD BY AUTOMATED COUNT: 13.1 % (ref 11.6–14.5)
FERRITIN SERPL-MCNC: 788 NG/ML (ref 8–388)
GLOBULIN SER CALC-MCNC: 3.8 G/DL (ref 2–4)
GLUCOSE BLD STRIP.AUTO-MCNC: 210 MG/DL (ref 70–110)
GLUCOSE BLD STRIP.AUTO-MCNC: 215 MG/DL (ref 70–110)
GLUCOSE BLD STRIP.AUTO-MCNC: 287 MG/DL (ref 70–110)
GLUCOSE BLD STRIP.AUTO-MCNC: 94 MG/DL (ref 70–110)
GLUCOSE BLD STRIP.AUTO-MCNC: 97 MG/DL (ref 70–110)
GLUCOSE SERPL-MCNC: 189 MG/DL (ref 74–99)
HCT VFR BLD AUTO: 33.3 % (ref 35–45)
HGB BLD-MCNC: 10.2 G/DL (ref 12–16)
LYMPHOCYTES # BLD: 0.8 K/UL (ref 0.9–3.6)
LYMPHOCYTES NFR BLD: 6 % (ref 21–52)
MCH RBC QN AUTO: 26.8 PG (ref 24–34)
MCHC RBC AUTO-ENTMCNC: 30.6 G/DL (ref 31–37)
MCV RBC AUTO: 87.6 FL (ref 78–100)
MONOCYTES # BLD: 0.6 K/UL (ref 0.05–1.2)
MONOCYTES NFR BLD: 5 % (ref 3–10)
NEUTS SEG # BLD: 11.5 K/UL (ref 1.8–8)
NEUTS SEG NFR BLD: 89 % (ref 40–73)
PLATELET # BLD AUTO: 439 K/UL (ref 135–420)
PLATELET COMMENTS,PCOM: ABNORMAL
PMV BLD AUTO: 10.8 FL (ref 9.2–11.8)
POTASSIUM SERPL-SCNC: 4.5 MMOL/L (ref 3.5–5.5)
PROT SERPL-MCNC: 5.8 G/DL (ref 6.4–8.2)
RBC # BLD AUTO: 3.8 M/UL (ref 4.2–5.3)
RBC MORPH BLD: ABNORMAL
SODIUM SERPL-SCNC: 136 MMOL/L (ref 136–145)
WBC # BLD AUTO: 12.9 K/UL (ref 4.6–13.2)

## 2021-11-10 PROCEDURE — 77010033711 HC HIGH FLOW OXYGEN

## 2021-11-10 PROCEDURE — 74011636637 HC RX REV CODE- 636/637: Performed by: INTERNAL MEDICINE

## 2021-11-10 PROCEDURE — 65660000000 HC RM CCU STEPDOWN

## 2021-11-10 PROCEDURE — 74011636637 HC RX REV CODE- 636/637

## 2021-11-10 PROCEDURE — 36415 COLL VENOUS BLD VENIPUNCTURE: CPT

## 2021-11-10 PROCEDURE — 74011250637 HC RX REV CODE- 250/637: Performed by: INTERNAL MEDICINE

## 2021-11-10 PROCEDURE — 74011250636 HC RX REV CODE- 250/636: Performed by: HOSPITALIST

## 2021-11-10 PROCEDURE — 74011250636 HC RX REV CODE- 250/636: Performed by: INTERNAL MEDICINE

## 2021-11-10 PROCEDURE — 97116 GAIT TRAINING THERAPY: CPT

## 2021-11-10 PROCEDURE — 82962 GLUCOSE BLOOD TEST: CPT

## 2021-11-10 PROCEDURE — 85025 COMPLETE CBC W/AUTO DIFF WBC: CPT

## 2021-11-10 PROCEDURE — 97530 THERAPEUTIC ACTIVITIES: CPT

## 2021-11-10 PROCEDURE — 80053 COMPREHEN METABOLIC PANEL: CPT

## 2021-11-10 PROCEDURE — 74011636637 HC RX REV CODE- 636/637: Performed by: HOSPITALIST

## 2021-11-10 PROCEDURE — 82728 ASSAY OF FERRITIN: CPT

## 2021-11-10 RX ORDER — INSULIN GLARGINE 100 [IU]/ML
10 INJECTION, SOLUTION SUBCUTANEOUS
Status: DISCONTINUED | OUTPATIENT
Start: 2021-11-10 | End: 2021-11-17

## 2021-11-10 RX ORDER — FUROSEMIDE 10 MG/ML
40 INJECTION INTRAMUSCULAR; INTRAVENOUS ONCE
Status: COMPLETED | OUTPATIENT
Start: 2021-11-10 | End: 2021-11-10

## 2021-11-10 RX ADMIN — Medication 500 MG: at 09:46

## 2021-11-10 RX ADMIN — INSULIN LISPRO 6 UNITS: 100 INJECTION, SOLUTION INTRAVENOUS; SUBCUTANEOUS at 17:31

## 2021-11-10 RX ADMIN — Medication 500 MG: at 21:22

## 2021-11-10 RX ADMIN — PANTOPRAZOLE SODIUM 40 MG: 40 TABLET, DELAYED RELEASE ORAL at 07:25

## 2021-11-10 RX ADMIN — Medication 2000 UNITS: at 09:46

## 2021-11-10 RX ADMIN — Medication 10 ML: at 07:24

## 2021-11-10 RX ADMIN — DEXAMETHASONE SODIUM PHOSPHATE 10 MG: 4 INJECTION, SOLUTION INTRAMUSCULAR; INTRAVENOUS at 21:22

## 2021-11-10 RX ADMIN — Medication 10 ML: at 21:22

## 2021-11-10 RX ADMIN — ENOXAPARIN SODIUM 30 MG: 30 INJECTION SUBCUTANEOUS at 09:46

## 2021-11-10 RX ADMIN — INSULIN LISPRO 4 UNITS: 100 INJECTION, SOLUTION INTRAVENOUS; SUBCUTANEOUS at 12:41

## 2021-11-10 RX ADMIN — ZINC SULFATE 220 MG (50 MG) CAPSULE 1 CAPSULE: CAPSULE at 09:46

## 2021-11-10 RX ADMIN — INSULIN LISPRO 4 UNITS: 100 INJECTION, SOLUTION INTRAVENOUS; SUBCUTANEOUS at 07:24

## 2021-11-10 RX ADMIN — INSULIN GLARGINE 10 UNITS: 100 INJECTION, SOLUTION SUBCUTANEOUS at 21:23

## 2021-11-10 RX ADMIN — Medication 10 MG: at 21:22

## 2021-11-10 RX ADMIN — Medication 10 ML: at 00:45

## 2021-11-10 RX ADMIN — ENOXAPARIN SODIUM 30 MG: 30 INJECTION SUBCUTANEOUS at 21:22

## 2021-11-10 RX ADMIN — FUROSEMIDE 40 MG: 10 INJECTION, SOLUTION INTRAMUSCULAR; INTRAVENOUS at 17:31

## 2021-11-10 NOTE — PROGRESS NOTES
Hospitalist Progress Note-critical care note     Patient: Camilo Davenport MRN: [id number]  CSN: [id number]    YOB: 1950  Age: 70 y.o. Sex: female    DOA: 11/3/2021 LOS:  LOS: 7 days            Chief complaint: covid 19 pnam, htn . DM     Assessment/Plan         Hospital Problems  Date Reviewed: 11/3/2021          Codes Class Noted POA    Acute respiratory failure due to COVID-19 [facility]) ICD-10-CM: U07.1, J96.00  ICD-9-CM: 518.81, 079.89  11/3/2021 Unknown        * (Principal) Pneumonia due to COVID-19 virus ICD-10-CM: U07.1, J12.82  ICD-9-CM: 480.8, 079.89  11/3/2021 Unknown        HTN (hypertension) ICD-10-CM: I10  ICD-9-CM: 401.9  11/3/2021 Unknown        Diabetes mellitus type 2, controlled ([facility] ICD-10-CM: E11.9  ICD-9-CM: 250.00  11/3/2021 Unknown               Hypoxia 2nd to COVID PNA  Worsening AM, put back high flow oxygen    Repeat CTA chest neg for PE on 11/7,        Pneumonia due to Covid with hypoxia,  Continue iv deca drone, ID f/u   Completed remdesivir  Continue supportive treatment   duplex of lower extremity neg for DVT  lovenox BID for DVT prevention  Ferritin trending down     Hyperglycemia/diabetes:   Continue lantus 10  SSI PRN     Hypertension  Continue hydralazine as needed     Mild increase in liver enzymes,   Due to viral infection   lft wnl          Hypokalemia-resolved     Pt was seen and examed in full PPE     Son was updated for pt's condition   35 total min's spent on patient care including >50% on counseling/coordinating care. Discussed the above assessments. also discussed labs, medications and hospital course    Live video  used during the visit     Subjective: I feel fine, not so bad     Encourage pt to prone and using incentive spirometry. Recommend do not remove oxygen without notifying RN. She indicated verbal understanding     Rn: need more nc O2.      Disposition :tbd,   Review of systems:  General: no fever /no chills   Lung :+ sob, no wheezing   Heart : no chest pain , no palpitation   Gi: no abdomen pain, no n/v     Vital signs/Intake and Output:  Visit Vitals  /70   Pulse 89   Temp 99.1 °F (37.3 °C)   Resp 19   Ht 5' 3\" (1.6 m)   Wt 78.7 kg (173 lb 8 oz)   SpO2 90%   BMI 30.73 kg/m²     Current Shift:  No intake/output data recorded. Last three shifts:  11/08 1901 - 11/10 0700  In: 480 [P.O.:480]  Out: -     Physical Exam:  General: WD, WN. Alert, cooperative, no acute distress    HEENT: NC, Atraumatic. PERRLA, anicteric sclerae. Nc oxygen   Lungs: CTA Bilaterally. No Wheezing/Rhonchi/Rales. Heart:  Regular  rhythm,  No murmur, No Rubs, No Gallops  Abdomen: Soft, Non distended, Non tender. +Bowel sounds,   Extremities: No c/c/e  Psych:   Not anxious or agitated. Neurologic:  No acute neurological deficit. Labs: Results:       Chemistry Recent Labs     11/10/21  0555 11/09/21 0440 11/08/21 0449   * 203* 165*    135* 136   K 4.5 4.0 4.2    102 101   CO2 31 30 30   BUN 16 13 14   CREA 0.68 0.62 0.60   CA 8.2* 8.4* 8.0*   AGAP 4 3 5   BUCR 24* 21* 23*    96 88   TP 5.8* 6.5 5.6*   ALB 2.0* 2.0* 2.0*   GLOB 3.8 4.5* 3.6   AGRAT 0.5* 0.4* 0.6*      CBC w/Diff Recent Labs     11/10/21  0555 11/09/21 0440 11/08/21 0449   WBC 12.9 11.1 12.4   RBC 3.80* 3.84* 3.78*   HGB 10.2* 10.1* 9.8*   HCT 33.3* 33.3* 32.4*   * 391 360   GRANS 89* 89* 85*   LYMPH 6* 6* 8*   EOS 0 0 0      Cardiac Enzymes No results for input(s): CPK, CKND1, ROSINA in the last 72 hours. No lab exists for component: CKRMB, TROIP   Coagulation No results for input(s): PTP, INR, APTT, INREXT, INREXT in the last 72 hours. Lipid Panel No results found for: CHOL, CHOLPOCT, CHOLX, CHLST, CHOLV, 227983, HDL, HDLP, LDL, LDLC, DLDLP, 376865, VLDLC, VLDL, TGLX, TRIGL, TRIGP, TGLPOCT, CHHD, CHHDX   BNP No results for input(s): BNPP in the last 72 hours.    Liver Enzymes Recent Labs     11/10/21  0555   TP 5.8*   ALB 2.0*         Thyroid Studies No results found for: T4, T3U, TSH, TSHEXT, TSHEXT     Procedures/imaging: see electronic medical records for all procedures/Xrays and details which were not copied into this note but were reviewed prior to creation of Plan    CTA CHEST W OR W WO CONT    Result Date: 11/8/2021  EXAM: CTA chest INDICATION: Pain. COMPARISON: November 3, 2021. TECHNIQUE: Axial CT imaging from the thoracic inlet through the diaphragm with intravenous contrast. Coronal and sagittal MIP reformats were generated. Dose reduction techniques used: automated exposure control, adjustment of the mAs and/or kVp according to patient size, and iterative reconstruction techniques. Digital imaging and communications in Medicine (DICOM) format image data are available to nonaffiliated external healthcare facilities or entities on a secure, media free, reciprocally searchable basis with patient authorization for at least 12 months after this study. _______________ FINDINGS: EXAM QUALITY: Adequate. PULMONARY ARTERIES: No evidence of pulmonary embolism. MEDIASTINUM: Normal heart size. No evidence of right heart strain. Aorta is unremarkable. No pericardial effusion. LUNGS: There are again seen numerous bilateral multilobar groundglass infiltrates. PLEURA: There are small bilateral pleural effusions. AIRWAY: Normal. LYMPH NODES: No enlarged nodes. UPPER ABDOMEN: Unremarkable. OTHER: No acute or aggressive osseous abnormalities identified. _______________     1. No evidence of pulmonary embolism. 2.  Numerous bilateral multilobar groundglass infiltrates again seen. 3. Small bilateral pleural effusions. CTA CHEST W OR W WO CONT    Result Date: 11/3/2021  EXAM: CTA chest CLINICAL INDICATION/HISTORY: COVID pneumonia, shortness of breath COMPARISON: Chest radiograph from the same day TECHNIQUE: Axial CT imaging from the thoracic inlet through the diaphragm with intravenous contrast. Coronal and sagittal MIP reformats were generated.  One or more dose reduction techniques were used on this CT: automated exposure control, adjustment of the mAs and/or kVp according to patient size, and iterative reconstruction techniques. The specific techniques used on this CT exam have been documented in the patient's electronic medical record. Digital Imaging and Communications in Medicine (DICOM) format image data are available to nonaffiliated external healthcare facilities or entities on a secured, media free, reciprocally searchable basis with patient authorization for at least a 12-month period after this study. _______________ FINDINGS: EXAM QUALITY: Quit PULMONARY ARTERIES: No pulmonary embolism identified. MEDIASTINUM: Normal heart size. Aorta is unremarkable. No pericardial effusion. LUNGS: Bilateral multilobar groundglass infiltrates secondary to COVID 19 pneumonia. AIRWAY: Normal. PLEURA: Normal. LYMPH NODES: No enlarged nodes. UPPER ABDOMEN: Unremarkable. BONES: No acute or aggressive osseous abnormalities identified. OTHER: None. _______________     1. No pulmonary embolism identified. 2. Bilateral multilobar groundglass infiltrates secondary to COVID 19 pneumonia.  ABD LTD    Result Date: 11/4/2021  EXAM: Limited right upper quadrant abdominal ultrasound INDICATION: Elevated liver function tests. COMPARISON: CT angiogram of the chest 11/3/2021. TECHNIQUE: Real-time abdomen/right upper quadrant sonography in multiple planes was performed with image documentation. Grayscale, color flow Doppler imaging, and velocity spectral waveform analysis of the portal vein was performed (duplex imaging). _______________ FINDINGS: LIVER: Mildly increased hepatic parenchymal echogenicity is present. No focal mass Color flow Doppler and velocity spectral waveform analysis of the portal vein shows normal (hepatopedal) direction of flow. Shayy Hunter BILIARY SYSTEM: No intrahepatic biliary dilatation. Common bile duct is normal in caliber measuring 0.4 cm.  GALLBLADDER: No gallstones or gallbladder wall thickening. No pericholecystic fluid. RIGHT KIDNEY: 8.6 cm in length. No hydronephrosis or renal mass. No visible calculi. PANCREAS: Head and body are unremarkable in appearance though the tail is obscured by overlying bowel gas. IVC: Visualized portions are unremarkable in appearance. OTHER: No free intraperitoneal fluid. _______________     Mildly increased hepatic parenchymal echotexture which can be seen in the context of steatosis and/or hepatocellular dysfunction. No biliary ductal dilatation or mass lesion. XR CHEST PORT    Result Date: 11/7/2021  EXAM: XR CHEST PORT CLINICAL INDICATION/HISTORY: Worsening hypoxia   > Additional: None. COMPARISON: November 3, 2021 TECHNIQUE: Portable chest _______________ FINDINGS: SUPPORT DEVICES: None. HEART AND MEDIASTINUM: The heart is stable in size and contour. LUNGS AND PLEURAL SPACES: Extensive multifocal airspace opacities and consolidation are seen diffusely throughout the lungs little interval change. BONY THORAX AND SOFT TISSUES: No acute osseous abnormality. _______________     Extensive multifocal airspace opacities similar to prior study. XR CHEST PORT    Result Date: 11/3/2021  EXAM: CHEST RADIOGRAPH CLINICAL INDICATION/HISTORY: meets SIRS criteria   > Additional: Short of breath and cough COMPARISON: None TECHNIQUE: Portable frontal view of the chest _______________ FINDINGS: SUPPORT DEVICES: None. HEART AND MEDIASTINUM: Heart size is normal. LUNGS AND PLEURAL SPACES: Patchy bilateral consolidations. No pleural effusion or pneumothorax. BONES AND SOFT TISSUES: Unremarkable. _______________     Patchy bilateral consolidations could relate to COVID 19 pneumonia. ECHO ADULT COMPLETE    Result Date: 11/4/2021  · Left Ventricle: Normal cavity size, wall thickness and systolic function (ejection fraction normal). The estimated EF is 60 - 65%. There is mild (grade 1) left ventricular diastolic dysfunction. Wall Scoring:  The left ventricular wall motion is normal. · Tricuspid Valve: Normal valve structure and no stenosis. Tricuspid regurgitation is inadequate for estimation of right ventricular systolic pressure. DUPLEX LOWER EXT VENOUS BILAT    Result Date: 11/5/2021  · No evidence of deep vein thrombosis in the right lower extremity. · No evidence of deep vein thrombosis in the left lower extremity.         Gregory Walden MD

## 2021-11-10 NOTE — ROUTINE PROCESS
Bedside and Verbal shift change report given to Tony Asif RN (oncoming nurse) by PRICILA Elena RN (offgoing nurse). Report included the following information SBAR, Kardex, ED Summary, Intake/Output, MAR and Recent Results.

## 2021-11-10 NOTE — PROGRESS NOTES
Comprehensive Nutrition Assessment    Type and Reason for Visit: Initial, RD nutrition re-screen/LOS    Nutrition Recommendations/Plan: Continue current diet and POC. Nutrition Assessment:  (P) PMHx: HTN, T2DM, hyperlipidemia. Came in w/ worsening cough, SOB, fever, and after several children and grandchildren sick with COVID-19- tests positive and dx given for PNA. Currently receiving oxygen via HFNC. Last BM 11/10. Labs- glucose (189) + all recent POC tests H (avg. 197). meds- vit C 500 mg, lantus, humalog, imodium, zofran, miralax, zinc.    Estimated Daily Nutrient Needs:  Energy (kcal): 1568; Weight Used for Energy Requirements: Ideal  Protein (g): 79; Weight Used for Protein Requirements: Current (1g)  Fluid (ml/day): 1568; Method Used for Fluid Requirements: 1 ml/kcal    Nutrition Related Findings:  (P) Current diet regular/controlled carbohydrate with 60 gm/meal. Minimal PO intake documented- only 1 recorded meal on 11/3 for %. Pt has lost wt since admission, though it was a stated wt. Lost 3% in past wk, or 2.5% since first measured wt (177 lbs 14.6 oz). Spoke to nurse- stated pt is currently eating >75% of most meals. Stated she would only eat soup when first admitted, but is now eating everything. Wounds:    None       Current Nutrition Therapies:  ADULT DIET Regular; 4 carb choices (60 gm/meal)    Anthropometric Measures:  Height:  5' 3\" (160 cm)  Current Body Wt:  78.7 kg (173 lb 8 oz)   Admission Body Wt:  179 lb (pt stated)    Usual Body Wt:   (no recent wt hx in chart)     Ideal Body Wt:  115 lbs:  150.9 %   BMI Category:  Obese class 1 (BMI 30.0-34. 9)       Nutrition Diagnosis:   Overweight/obesity related to excessive energy intake as evidenced by BMI    Nutrition Interventions:   Food and/or Nutrient Delivery: Continue current diet  Nutrition Education and Counseling: No recommendations at this time  Coordination of Nutrition Care: Continue to monitor while inpatient    Goals:  (P) Pt to continue with PO intake >50% for most meals throughout the next 5-7 days. Pt will maintain wt or lose 1-2 lbs of wt throughout the next 5-7 days.        Nutrition Monitoring and Evaluation:   Behavioral-Environmental Outcomes: None identified  Food/Nutrient Intake Outcomes: Food and nutrient intake  Physical Signs/Symptoms Outcomes:      Discharge Planning:    Continue current diet     Electronically signed by Rosalynd Gowers, RD on 11/10/2021 at 2:45 PM

## 2021-11-10 NOTE — PROGRESS NOTES
Care manager noted O2 needs have increased to HFNC 8L; will continue to monitor patient progress; patient has been encouraged to self prone. Care Management Interventions  PCP Verified by CM:  Yes  Last Visit to PCP: 11/03/21  Mode of Transport at Discharge: Self  Transition of Care Consult (CM Consult): Discharge Planning  Support Systems: Child(ama)  Confirm Follow Up Transport: Family  The Plan for Transition of Care is Related to the Following Treatment Goals : covid 19 pneumonia  The Patient and/or Patient Representative was Provided with a Choice of Provider and Agrees with the Discharge Plan?: Yes  Name of the Patient Representative Who was Provided with a Choice of Provider and Agrees with the Discharge Plan: Augusto Nissen, son  Discharge Location  Discharge Placement: Home with family assistance

## 2021-11-10 NOTE — PROGRESS NOTES
Problem: Risk for Spread of Infection  Goal: Prevent transmission of infectious organism to others  Description: Prevent the transmission of infectious organisms to other patients, staff members, and visitors. Outcome: Progressing Towards Goal     Problem: Patient Education:  Go to Education Activity  Goal: Patient/Family Education  Outcome: Progressing Towards Goal     Problem: Airway Clearance - Ineffective  Goal: Achieve or maintain patent airway  Outcome: Progressing Towards Goal     Problem: Gas Exchange - Impaired  Goal: Absence of hypoxia  Outcome: Progressing Towards Goal  Goal: Promote optimal lung function  Outcome: Progressing Towards Goal     Problem: Breathing Pattern - Ineffective  Goal: Ability to achieve and maintain a regular respiratory rate  Outcome: Progressing Towards Goal     Problem:  Body Temperature -  Risk of, Imbalanced  Goal: Ability to maintain a body temperature within defined limits  Outcome: Progressing Towards Goal  Goal: Will regain or maintain usual level of consciousness  Outcome: Progressing Towards Goal  Goal: Complications related to the disease process, condition or treatment will be avoided or minimized  Outcome: Progressing Towards Goal     Problem: Isolation Precautions - Risk of Spread of Infection  Goal: Prevent transmission of infectious organism to others  Outcome: Progressing Towards Goal     Problem: Risk for Fluid Volume Deficit  Goal: Maintain normal heart rhythm  Outcome: Progressing Towards Goal  Goal: Maintain absence of muscle cramping  Outcome: Progressing Towards Goal  Goal: Maintain normal serum potassium, sodium, calcium, phosphorus, and pH  Outcome: Progressing Towards Goal     Problem: Fatigue  Goal: Verbalize increase energy and improved vitality  Outcome: Progressing Towards Goal     Problem: Loneliness or Risk for Loneliness  Goal: Demonstrate positive use of time alone when socialization is not possible  Outcome: Progressing Towards Goal     Problem: Patient Education: Go to Patient Education Activity  Goal: Patient/Family Education  Outcome: Progressing Towards Goal     Problem: Falls - Risk of  Goal: *Absence of Falls  Description: Document Jaziel Maki Fall Risk and appropriate interventions in the flowsheet.   Outcome: Progressing Towards Goal  Note: Fall Risk Interventions:  Mobility Interventions: Patient to call before getting OOB         Medication Interventions: Evaluate medications/consider consulting pharmacy, Teach patient to arise slowly    Elimination Interventions: Call light in reach, Patient to call for help with toileting needs              Problem: Patient Education: Go to Patient Education Activity  Goal: Patient/Family Education  Outcome: Progressing Towards Goal     Problem: Patient Education: Go to Patient Education Activity  Goal: Patient/Family Education  Outcome: Progressing Towards Goal

## 2021-11-10 NOTE — PROGRESS NOTES
[facility]  (A Division of [address] [place] [address])                                                                                                                    Jaclyn Nuñez MD  Office #: - Option # [phone]  Fax #: [phone]     Date of Admission: 11/3/2021Date of Note: 11/10/2021  Reason for FU: Evaluation and antibiotic management of CPVID 19 infection  . Current Antimicrobials:    Prior Antimicrobials:  Dexamethasone 11/3 to date #7     Remdesivir 11/3 to date#5       Assessment- ID related:  --------------------------------------------------------------------------  · COVId 19 infection  S/P remdesvir  · Viral PNA- Bilateral- Patchy infiltrate by CXR and B/L multilobular GGO on CT  · Acute hypoxic respiratory failure PaO2 50 on ABG:  Had gotten worse over weekend, now improving  CTA chest 11/7- no new PE  · Rhabdo 2/2 viral infection with transaminitis: Improving  · CoNS bacteremia- likely contaminant    COVId rapid + 11/3  Influenza A antigen : negative  Procal X2 NL  DD elevated to 7.84-> 2.28  CRP 12.7-> 14.5-> 8.8  Ferretin 63428-> 2953    HIV 1/2- negative  Hepatitis C ab/ Hep B -negative. TB serology: Negative    Improving bio-markers while worsening hypoxia overnight, need further evaluation.    Other Medical Issues- Mx per respective team:  · HTN  · Hyperlipidemia  · DM     Recommendation for ID issues I am following:  --------------------------------------------------------------------------  -cont dexamethasone per protcol    -> Biomarkers declining but slow process with improvement of hypoxia  DW pt via audio  about proning, IS   BNP looks ok and congestion less likely, but will give 1 dose of lasix and monitor, RANDELL RN  CPK/CRP/ cbc , cmp, cbc with diff in am  -> Vit complex/weaning off O2 per primary             Subjective:  Afebrile, WBC at 12.9K, biomarkers decline  No acute issue overnight  Her progress with weaning off oxygen fluctuates, oxygen on the low side again today  Rosette Mail her via  at length. She states to feel fine, no complaint of distress/sob/chest pain/abd pain/N/V.  -many cans of fruit drink- but states she takes half can per day  -she is aware of her diagnosis and she has been proning since last discussion. She was on her side initially, but was able to prone easily      Labs/notes reviewed        HPI:  Lauren Pickard is a 70 y.o. BLACK/ who is unvaccinated with PMH as listed below with limited communication due to [address] barrier. Admitted on 11/3 with hypoxia/symptoms of SOB and fever and body aches X4 days. Was hypoxic to 86% on RA-- apparently with muliplte family members sick with COVID 19 too. Found with fever, B/L lung infiltrate on CXR and CTA chest without PE, elevared D-dimer, high ferretin and CRP. Oxygen demand has been 4litres and is better down to 2 L at exam today. Currently on Remdesvir/dexamethasone. [employer] [address] from admission 1 set + for [facility]. Active Hospital Problems    Diagnosis Date Noted    Acute respiratory failure due to COVID-19 [facility]) 11/03/2021    Pneumonia due to COVID-19 virus 11/03/2021    HTN (hypertension) 11/03/2021    Diabetes mellitus type 2, controlled ([facility] 11/03/2021     Past Medical History:   Diagnosis Date    Diabetes ([facility]     HTN (hypertension)     Hyperlipidemia      History reviewed. No pertinent surgical history. Family History   Problem Relation Age of Onset    Hypertension Mother      Social History     Socioeconomic History    Marital status:      Spouse name: Not on file    Number of children: Not on file    Years of education: Not on file    Highest education level: Not on file   Occupational History    Not on file   Tobacco Use    Smoking status: Never Smoker    Smokeless tobacco: Never Used   Substance and Sexual Activity    Alcohol use:  Yes    Drug use: Never    Sexual activity: Not Currently   Other Topics Concern    Not on file   Social History Narrative    Not on file     Social Determinants of Health     Financial Resource Strain:     Difficulty of Paying Living Expenses: Not on file   Food Insecurity:     Worried About Running Out of Food in the Last Year: Not on file    Aston of Food in the Last Year: Not on file   Transportation Needs:     Lack of Transportation (Medical): Not on file    Lack of Transportation (Non-Medical): Not on file   Physical Activity:     Days of Exercise per Week: Not on file    Minutes of Exercise per Session: Not on file   Stress:     Feeling of Stress : Not on file   Social Connections:     Frequency of Communication with Friends and Family: Not on file    Frequency of Social Gatherings with Friends and Family: Not on file    Attends [employer] Services: Not on file    Active Member of [address] [place] [address] or Organizations: Not on file    Attends Club or Organization Meetings: Not on file    Marital Status: Not on file   Intimate Partner Violence:     Fear of Current or Ex-Partner: Not on file    Emotionally Abused: Not on file    Physically Abused: Not on file    Sexually Abused: Not on file   Housing Stability:     Unable to Pay for Housing in the Last Year: Not on file    Number of [place] in the Last Year: Not on file    Unstable Housing in the Last Year: Not on file       Allergies:  Patient has no known allergies.      Medications:  Current Facility-Administered Medications   Medication Dose Route Frequency    insulin glargine (LANTUS) injection 10 Units  10 Units SubCUTAneous QHS    furosemide (LASIX) injection 40 mg  40 mg IntraVENous ONCE    pantoprazole (PROTONIX) tablet 40 mg  40 mg Oral ACB    dexamethasone (DECADRON) 4 mg/mL injection 10 mg  10 mg IntraVENous Q24H    loperamide (IMODIUM) capsule 4 mg  4 mg Oral Q4H PRN    sodium chloride (NS) flush 5-10 mL  5-10 mL IntraVENous PRN    enoxaparin (LOVENOX) injection 30 mg  30 mg SubCUTAneous Q12H  acetaminophen (TYLENOL) tablet 650 mg  650 mg Oral Q6H PRN    Or    acetaminophen (TYLENOL) suppository 650 mg  650 mg Rectal Q6H PRN    guaiFENesin-dextromethorphan (ROBITUSSIN DM) 100-10 mg/5 mL syrup 5 mL  5 mL Oral Q4H PRN    cholecalciferol (VITAMIN D3) (1000 Units /25 mcg) tablet 2,000 Units  2,000 Units Oral DAILY    insulin lispro (HUMALOG) injection   SubCUTAneous AC&HS    glucose chewable tablet 16 g  4 Tablet Oral PRN    glucagon (GLUCAGEN) injection 1 mg  1 mg IntraMUSCular PRN    dextrose (D50W) injection syrg 12.5-25 g  25-50 mL IntraVENous PRN    sodium chloride (NS) flush 5-40 mL  5-40 mL IntraVENous Q8H    sodium chloride (NS) flush 5-40 mL  5-40 mL IntraVENous PRN    polyethylene glycol (MIRALAX) packet 17 g  17 g Oral DAILY PRN    ondansetron (ZOFRAN ODT) tablet 4 mg  4 mg Oral Q8H PRN    Or    ondansetron (ZOFRAN) injection 4 mg  4 mg IntraVENous Q6H PRN    zinc sulfate (ZINCATE) 50 mg zinc (220 mg) capsule 1 Capsule  1 Capsule Oral DAILY    ascorbic acid (vitamin C) (VITAMIN C) tablet 500 mg  500 mg Oral BID    melatonin (rapid dissolve) tablet 10 mg  10 mg Oral QHS    hydrALAZINE (APRESOLINE) 20 mg/mL injection 10 mg  10 mg IntraVENous Q6H PRN          Physical Exam:    Temp (24hrs), Av.5 °F (36.9 °C), Min:97.7 °F (36.5 °C), Max:99.9 °F (37.7 °C)    Visit Vitals  /63   Pulse 92   Temp 99.9 °F (37.7 °C)   Resp 19   Ht 5' 3\" (1.6 m)   Wt 78.7 kg (173 lb 8 oz)   SpO2 91%   BMI 30.73 kg/m²          GEN: WD Pbese, on her side. On 8L of oxygen via NC  HEENT: Unicteric. EOMI intact  CHEST: Non laboured breathing. No wheezes, no rales  ABD: Obese/soft. Non tender. PEPE: Deferred  EXT: No apparent swelling or redness on UE/LE joints. Skin: Dry and intact. No rash, no redness. CNS: A, OX3. Moves all extremity. CN grossly ok.       Microbiology  All Micro Results     Procedure Component Value Units Date/Time    CULTURE, BLOOD [[id number]]  (Abnormal) Collected: 21 1746    Order Status: Completed Specimen: Blood Updated: 11/05/21 1227     Special Requests: NO SPECIAL REQUESTS        GRAM STAIN       AEROBIC BOTTLE GRAM POSITIVE COCCI IN CLUSTERS                  SMEAR CALLED TO AND CORRECTLY REPEATED BY: Keny Barrett RN 3N BY CAM ON 11/4/21 AT 1233 [address]. GRAM POSITIVE COCCI IN CLUSTERS ANAEROBIC BOTTLE                  SMEAR CALLED TO AND CORRECTLY REPEATED BY: Darion Dela Cruz RN 3N AT 1500 ON 11/04/2021 TO [facility]. Culture result:       STAPHYLOCOCCUS SPECIES, COAGULASE NEGATIVE MULTIPLE COLONY TYPES/STRAINS GROWING IN THE AEROBIC AND ANAEROBIC BOTTLES NO SITE INDICATED          LEGIONELLA PNEUMOPHILA AG, URINE [[id number]] Collected: 11/03/21 2045    Order Status: Canceled Specimen: Urine     STREP Erinsirisha Crenshawosse, URINE [[id number]] Collected: 11/03/21 2045    Order Status: Canceled Specimen: Urine     CULTURE, RESPIRATORY/SPUTUM/BRONCH Alisson Ly STAIN [[id number]] Collected: 11/03/21 2045    Order Status: Canceled Specimen: Sputum     INFLUENZA A & B AG (RAPID TEST) [[id number]] Collected: 11/03/21 1813    Order Status: Completed Specimen: Nasopharyngeal from Nasal washing Updated: 11/03/21 1843     Influenza A Antigen Negative        Comment: A negative result does not exclude influenza virus infection, seasonal or H1N1 due to suboptimal sensitivity. If influenza is circulating in your community, a diagnosis of influenza should be considered based on a patients clinical presentation and empiric antiviral treatment should be considered, if indicated. Influenza B Antigen Negative       COVID-19 RAPID TEST [[id number]]  (Abnormal) Collected: 11/03/21 1813    Order Status: Completed Specimen: Nasopharyngeal Updated: 11/03/21 1840     Specimen source Nasopharyngeal        COVID-19 rapid test Detected        Comment:      The specimen is POSITIVE for SARS-CoV-2, the novel coronavirus associated with COVID-19.        This test has been authorized by the FDA under an Emergency Use Authorization (EUA) for use by authorized laboratories. Fact sheet for Healthcare Providers: ConventionUpdate.co.nz  Fact sheet for Patients: ConventionUpdate.co.nz       Methodology: Isothermal Nucleic Acid Amplification  CALLED TO AND CORRECTLY REPEATED BY:  Lacey TAY IN ER  [place] ON 11/03/2021 TO [facility].                   Lines / Catheters:  Lab results:    Chemistry  Recent Labs     11/10/21  0555 11/09/21 0440 11/08/21 0449   * 203* 165*    135* 136   K 4.5 4.0 4.2    102 101   CO2 31 30 30   BUN 16 13 14   CREA 0.68 0.62 0.60   CA 8.2* 8.4* 8.0*   AGAP 4 3 5   BUCR 24* 21* 23*    96 88   TP 5.8* 6.5 5.6*   ALB 2.0* 2.0* 2.0*   GLOB 3.8 4.5* 3.6   AGRAT 0.5* 0.4* 0.6*       CBC w/ Diff  Recent Labs     11/10/21  0555 11/09/21 0440 11/08/21 0449   WBC 12.9 11.1 12.4   RBC 3.80* 3.84* 3.78*   HGB 10.2* 10.1* 9.8*   HCT 33.3* 33.3* 32.4*   * 391 360   GRANS 89* 89* 85*   LYMPH 6* 6* 8*   EOS 0 0 0       Imaging: report posted below as per radiologist - reports reviewed in Epic  Total duration of time spent with patient interview and exam and discussion of plan of care, review of chart in care everywhere, discussion with medical staff- nursing/attending and additional specialities as indicated: 32 minutes

## 2021-11-10 NOTE — ROUTINE PROCESS
Patient sitting up in bed. A&Ox4 and verbal. Patient noted to be experiencing SOB with NC cord over her head. O2 NC placed back on nose. Patient was pointing to an empty bottle of water. I asked the patient does if she wanted some water in her language which this nurse also speak and she replied \"oui\" (yes) and stated that she was very thirsty. This nurse already brought in a cup of ice water but patient refused it and stated she didn't want ice in it. New cup of water was given. O2 sat in the mid 80's. Patient is increase to 5L from 4L, not effective. Continuous increase in O2 was done, still not effective. 7.5L 93%. Patient is encourage to keep NC in place. Continuous O2 sensor replace. Patient's needs was met. Bedtime snacks given.

## 2021-11-11 ENCOUNTER — APPOINTMENT (OUTPATIENT)
Dept: VASCULAR SURGERY | Age: 71
DRG: 177 | End: 2021-11-11
Attending: HOSPITALIST
Payer: MEDICARE

## 2021-11-11 ENCOUNTER — APPOINTMENT (OUTPATIENT)
Dept: GENERAL RADIOLOGY | Age: 71
DRG: 177 | End: 2021-11-11
Attending: INTERNAL MEDICINE
Payer: MEDICARE

## 2021-11-11 LAB
ALBUMIN SERPL-MCNC: 2 G/DL (ref 3.4–5)
ALBUMIN/GLOB SERPL: 0.5 {RATIO} (ref 0.8–1.7)
ALP SERPL-CCNC: 112 U/L (ref 45–117)
ALT SERPL-CCNC: 46 U/L (ref 13–56)
ANION GAP SERPL CALC-SCNC: 4 MMOL/L (ref 3–18)
AST SERPL-CCNC: 28 U/L (ref 10–38)
BASE EXCESS BLD CALC-SCNC: 3.7 MMOL/L
BASOPHILS # BLD: 0 K/UL (ref 0–0.1)
BASOPHILS NFR BLD: 0 % (ref 0–2)
BDY SITE: ABNORMAL
BILIRUB SERPL-MCNC: 0.6 MG/DL (ref 0.2–1)
BUN SERPL-MCNC: 18 MG/DL (ref 7–18)
BUN/CREAT SERPL: 21 (ref 12–20)
CALCIUM SERPL-MCNC: 8 MG/DL (ref 8.5–10.1)
CHLORIDE SERPL-SCNC: 98 MMOL/L (ref 100–111)
CK SERPL-CCNC: 67 U/L (ref 26–192)
CO2 SERPL-SCNC: 32 MMOL/L (ref 21–32)
CREAT SERPL-MCNC: 0.86 MG/DL (ref 0.6–1.3)
CRP SERPL-MCNC: 3.3 MG/DL (ref 0–0.3)
DIFFERENTIAL METHOD BLD: ABNORMAL
EOSINOPHIL # BLD: 0 K/UL (ref 0–0.4)
EOSINOPHIL NFR BLD: 0 % (ref 0–5)
ERYTHROCYTE [DISTWIDTH] IN BLOOD BY AUTOMATED COUNT: 13.1 % (ref 11.6–14.5)
FERRITIN SERPL-MCNC: 630 NG/ML (ref 8–388)
GAS FLOW.O2 O2 DELIVERY SYS: ABNORMAL L/MIN
GLOBULIN SER CALC-MCNC: 3.9 G/DL (ref 2–4)
GLUCOSE BLD STRIP.AUTO-MCNC: 123 MG/DL (ref 70–110)
GLUCOSE BLD STRIP.AUTO-MCNC: 138 MG/DL (ref 70–110)
GLUCOSE BLD STRIP.AUTO-MCNC: 157 MG/DL (ref 70–110)
GLUCOSE BLD STRIP.AUTO-MCNC: 313 MG/DL (ref 70–110)
GLUCOSE SERPL-MCNC: 355 MG/DL (ref 74–99)
HCO3 BLD-SCNC: 27.8 MMOL/L (ref 22–26)
HCT VFR BLD AUTO: 33.2 % (ref 35–45)
HGB BLD-MCNC: 10.4 G/DL (ref 12–16)
LYMPHOCYTES # BLD: 0.6 K/UL (ref 0.9–3.6)
LYMPHOCYTES NFR BLD: 6 % (ref 21–52)
MCH RBC QN AUTO: 27 PG (ref 24–34)
MCHC RBC AUTO-ENTMCNC: 31.3 G/DL (ref 31–37)
MCV RBC AUTO: 86.2 FL (ref 78–100)
MONOCYTES # BLD: 0.5 K/UL (ref 0.05–1.2)
MONOCYTES NFR BLD: 4 % (ref 3–10)
NEUTS SEG # BLD: 9.9 K/UL (ref 1.8–8)
NEUTS SEG NFR BLD: 85 % (ref 40–73)
O2/TOTAL GAS SETTING VFR VENT: 90 %
PCO2 BLD: 39.3 MMHG (ref 35–45)
PH BLD: 7.46 [PH] (ref 7.35–7.45)
PLATELET # BLD AUTO: 443 K/UL (ref 135–420)
PMV BLD AUTO: 10.7 FL (ref 9.2–11.8)
PO2 BLD: 66 MMHG (ref 80–100)
POTASSIUM SERPL-SCNC: 4.4 MMOL/L (ref 3.5–5.5)
PROT SERPL-MCNC: 5.9 G/DL (ref 6.4–8.2)
RBC # BLD AUTO: 3.85 M/UL (ref 4.2–5.3)
SAO2 % BLD: 93.6 % (ref 92–97)
SERVICE CMNT-IMP: ABNORMAL
SODIUM SERPL-SCNC: 134 MMOL/L (ref 136–145)
SPECIMEN TYPE: ABNORMAL
WBC # BLD AUTO: 11.6 K/UL (ref 4.6–13.2)

## 2021-11-11 PROCEDURE — 74011250636 HC RX REV CODE- 250/636: Performed by: INTERNAL MEDICINE

## 2021-11-11 PROCEDURE — 74011250636 HC RX REV CODE- 250/636: Performed by: HOSPITALIST

## 2021-11-11 PROCEDURE — 71045 X-RAY EXAM CHEST 1 VIEW: CPT

## 2021-11-11 PROCEDURE — 74011250637 HC RX REV CODE- 250/637: Performed by: INTERNAL MEDICINE

## 2021-11-11 PROCEDURE — 36415 COLL VENOUS BLD VENIPUNCTURE: CPT

## 2021-11-11 PROCEDURE — 65610000006 HC RM INTENSIVE CARE

## 2021-11-11 PROCEDURE — 82550 ASSAY OF CK (CPK): CPT

## 2021-11-11 PROCEDURE — 74011636637 HC RX REV CODE- 636/637

## 2021-11-11 PROCEDURE — 74011636637 HC RX REV CODE- 636/637: Performed by: INTERNAL MEDICINE

## 2021-11-11 PROCEDURE — 74011636637 HC RX REV CODE- 636/637: Performed by: HOSPITALIST

## 2021-11-11 PROCEDURE — 93970 EXTREMITY STUDY: CPT

## 2021-11-11 PROCEDURE — 82962 GLUCOSE BLOOD TEST: CPT

## 2021-11-11 PROCEDURE — 74011000258 HC RX REV CODE- 258: Performed by: INTERNAL MEDICINE

## 2021-11-11 PROCEDURE — 82803 BLOOD GASES ANY COMBINATION: CPT

## 2021-11-11 PROCEDURE — 80053 COMPREHEN METABOLIC PANEL: CPT

## 2021-11-11 PROCEDURE — 82728 ASSAY OF FERRITIN: CPT

## 2021-11-11 PROCEDURE — 86140 C-REACTIVE PROTEIN: CPT

## 2021-11-11 PROCEDURE — 77010026065 HC OXYGEN MINIMUM MEDICAL AIR

## 2021-11-11 PROCEDURE — 85025 COMPLETE CBC W/AUTO DIFF WBC: CPT

## 2021-11-11 PROCEDURE — 36600 WITHDRAWAL OF ARTERIAL BLOOD: CPT

## 2021-11-11 RX ORDER — ASPIRIN 325 MG/1
100 TABLET, FILM COATED ORAL DAILY
Status: DISCONTINUED | OUTPATIENT
Start: 2021-11-12 | End: 2021-11-18 | Stop reason: HOSPADM

## 2021-11-11 RX ORDER — INSULIN LISPRO 100 [IU]/ML
3 INJECTION, SOLUTION INTRAVENOUS; SUBCUTANEOUS
Status: DISCONTINUED | OUTPATIENT
Start: 2021-11-11 | End: 2021-11-17

## 2021-11-11 RX ADMIN — ENOXAPARIN SODIUM 30 MG: 30 INJECTION SUBCUTANEOUS at 21:42

## 2021-11-11 RX ADMIN — ZINC SULFATE 220 MG (50 MG) CAPSULE 1 CAPSULE: CAPSULE at 08:47

## 2021-11-11 RX ADMIN — INSULIN LISPRO 8 UNITS: 100 INJECTION, SOLUTION INTRAVENOUS; SUBCUTANEOUS at 06:54

## 2021-11-11 RX ADMIN — ENOXAPARIN SODIUM 30 MG: 30 INJECTION SUBCUTANEOUS at 08:47

## 2021-11-11 RX ADMIN — PANTOPRAZOLE SODIUM 40 MG: 40 TABLET, DELAYED RELEASE ORAL at 06:54

## 2021-11-11 RX ADMIN — Medication 500 MG: at 08:47

## 2021-11-11 RX ADMIN — INSULIN GLARGINE 10 UNITS: 100 INJECTION, SOLUTION SUBCUTANEOUS at 21:43

## 2021-11-11 RX ADMIN — Medication 10 ML: at 14:00

## 2021-11-11 RX ADMIN — INSULIN LISPRO 2 UNITS: 100 INJECTION, SOLUTION INTRAVENOUS; SUBCUTANEOUS at 11:30

## 2021-11-11 RX ADMIN — TOCILIZUMAB 600 MG: 20 INJECTION, SOLUTION, CONCENTRATE INTRAVENOUS at 13:11

## 2021-11-11 RX ADMIN — INSULIN LISPRO 3 UNITS: 100 INJECTION, SOLUTION INTRAVENOUS; SUBCUTANEOUS at 18:00

## 2021-11-11 RX ADMIN — Medication 500 MG: at 21:44

## 2021-11-11 RX ADMIN — Medication 10 ML: at 06:55

## 2021-11-11 RX ADMIN — Medication 10 ML: at 21:50

## 2021-11-11 RX ADMIN — Medication 2000 UNITS: at 08:47

## 2021-11-11 RX ADMIN — DEXAMETHASONE SODIUM PHOSPHATE 10 MG: 4 INJECTION, SOLUTION INTRAMUSCULAR; INTRAVENOUS at 21:44

## 2021-11-11 RX ADMIN — Medication 10 MG: at 21:42

## 2021-11-11 NOTE — PROGRESS NOTES
DC Plan: TBD based on patient response    Care manager noted increase in patient O2 demands - now on HFNC 50L/90% and is pending transfer to ICU for monitoring and further care; care manager will continue to follow for discharge planning as appropriate. Care Management Interventions  PCP Verified by CM:  Yes  Last Visit to PCP: 11/03/21  Mode of Transport at Discharge: Self  Transition of Care Consult (CM Consult): Discharge Planning  Support Systems: Child(ama)  Confirm Follow Up Transport: Family  The Plan for Transition of Care is Related to the Following Treatment Goals : covid 19 pneumonia  The Patient and/or Patient Representative was Provided with a Choice of Provider and Agrees with the Discharge Plan?: Yes  Name of the Patient Representative Who was Provided with a Choice of Provider and Agrees with the Discharge Plan: Yazminjenny Aburtodante, son  Discharge Location  Discharge Placement: Home with family assistance/TBD

## 2021-11-11 NOTE — PROGRESS NOTES
Problem: Risk for Spread of Infection  Goal: Prevent transmission of infectious organism to others  Description: Prevent the transmission of infectious organisms to other patients, staff members, and visitors. Outcome: Progressing Towards Goal     Problem: Patient Education:  Go to Education Activity  Goal: Patient/Family Education  Outcome: Progressing Towards Goal     Problem: Airway Clearance - Ineffective  Goal: Achieve or maintain patent airway  Outcome: Progressing Towards Goal     Problem: Gas Exchange - Impaired  Goal: Absence of hypoxia  Outcome: Progressing Towards Goal  Goal: Promote optimal lung function  Outcome: Progressing Towards Goal     Problem: Breathing Pattern - Ineffective  Goal: Ability to achieve and maintain a regular respiratory rate  Outcome: Progressing Towards Goal     Problem:  Body Temperature -  Risk of, Imbalanced  Goal: Ability to maintain a body temperature within defined limits  Outcome: Progressing Towards Goal  Goal: Will regain or maintain usual level of consciousness  Outcome: Progressing Towards Goal  Goal: Complications related to the disease process, condition or treatment will be avoided or minimized  Outcome: Progressing Towards Goal     Problem: Isolation Precautions - Risk of Spread of Infection  Goal: Prevent transmission of infectious organism to others  Outcome: Progressing Towards Goal     Problem: Nutrition Deficits  Goal: Optimize nutrtional status  Outcome: Progressing Towards Goal     Problem: Risk for Fluid Volume Deficit  Goal: Maintain normal heart rhythm  Outcome: Progressing Towards Goal  Goal: Maintain absence of muscle cramping  Outcome: Progressing Towards Goal  Goal: Maintain normal serum potassium, sodium, calcium, phosphorus, and pH  Outcome: Progressing Towards Goal     Problem: Loneliness or Risk for Loneliness  Goal: Demonstrate positive use of time alone when socialization is not possible  Outcome: Progressing Towards Goal     Problem: Fatigue  Goal: Verbalize increase energy and improved vitality  Outcome: Progressing Towards Goal     Problem: Patient Education: Go to Patient Education Activity  Goal: Patient/Family Education  Outcome: Progressing Towards Goal     Problem: Falls - Risk of  Goal: *Absence of Falls  Description: Document Loy Dorado Fall Risk and appropriate interventions in the flowsheet.   Outcome: Progressing Towards Goal  Note: Fall Risk Interventions:  Mobility Interventions: Patient to call before getting OOB, Bed/chair exit alarm         Medication Interventions: Assess postural VS orthostatic hypotension, Patient to call before getting OOB, Teach patient to arise slowly    Elimination Interventions: Call light in reach, Bed/chair exit alarm, Patient to call for help with toileting needs              Problem: Patient Education: Go to Patient Education Activity  Goal: Patient/Family Education  Outcome: Progressing Towards Goal     Problem: Patient Education: Go to Patient Education Activity  Goal: Patient/Family Education  Outcome: Progressing Towards Goal

## 2021-11-11 NOTE — PROGRESS NOTES
Problem: Mobility Impaired (Adult and Pediatric)  Goal: *Acute Goals and Plan of Care (Insert Text)  Description: Initiated 11/6/2021 and to be accomplished within 7 day(s)  1. Patient will move from supine to sit and sit to supine  in bed with modified independence. 2.  Patient will transfer from bed to chair and chair to bed with modified independence using the least restrictive device. 3.  Patient will perform sit to stand with modified independence. 4.  Patient will ambulate with modified independence for 150 feet with the least restrictive device. Outcome: Progressing Towards Goal   physical Therapy TREATMENT    Patient: Camilo Davenport (67 y.o. female)  Date: 11/10/2021  Diagnosis: Pneumonia due to COVID-19 virus [U07.1, J12.82]  Acute respiratory failure due to COVID-19 (HCC) [U07.1, J96.00]   Pneumonia due to COVID-19 virus       Precautions: Fall   Chart, physical therapy assessment, plan of care and goals were reviewed. ASSESSMENT:  RN cleared pt for PT session. Full PPE donned. Pt found in prone position in bed, with SPO2 of 93% on 9L NC. Assistance provided to reach EOB sitting, where SPO2 dropped to 86%. IS education attempted, but limited by language barrier. Recover to 92% SPO2, after 5 min of focused breathing. Pt is able to stand and perform marching and  stepping (4 trials) activities with RW, but switched to HHA (poor understanding of RW spacing). SPO2 briefly dropped to 77%, but recovered to 90% within 3 min). Pt has no SOB or excessive fatigue. Will need O2 saturation improvement to meet home mobility needs. Progression toward goals:  []      Improving appropriately and progressing toward goals  [x]      Improving slowly and progressing toward goals  []      Not making progress toward goals and plan of care will be adjusted     PLAN:  Patient continues to benefit from skilled intervention to address the above impairments.   Continue treatment per established plan of care.  Discharge Recommendations:  Home Health or To Be Determined  Further Equipment Recommendations for Discharge:  gait belt, portable oxygen, and rolling walker     SUBJECTIVE:   Patient stated I'm not .     OBJECTIVE DATA SUMMARY:   Critical Behavior:  Neurologic State: Alert, Appropriate for age  Orientation Level: Oriented X4  Safety/Judgement: Fall prevention  Functional Mobility Training:  Bed Mobility:  Rolling: Supervision  Supine to Sit: Stand-by assistance  Sit to Supine: Stand-by assistance   Transfers:  Sit to Stand: Contact guard assistance  Stand to Sit: Contact guard assistance  Balance:  Sitting: Intact  Standing: With support  Standing - Static: Good  Ambulation/Gait Training:  Distance (ft): 30 Feet (ft) (5' F/B x 3)  Assistive Device: Gait belt (HHA)  Ambulation - Level of Assistance: Contact guard assistance; Stand-by assistance  Gait Abnormalities: Decreased step clearance  Base of Support: Widened  Pain:  Pain Scale 1: Numeric (0 - 10)  Pain Intensity 1: 0  Pain out: 0  Activity Tolerance:   Fair  Please refer to the flowsheet for vital signs taken during this treatment.   After treatment:   [] Patient left in no apparent distress sitting up in chair  [x] Patient left in no apparent distress in bed (Prone)  [x] Call bell left within reach  [x] Nursing notified  [] Caregiver present  [x] Bed alarm activated      Louretta Boas, PTA   Time Calculation: 28 mins

## 2021-11-11 NOTE — CONSULTS
Pulmonary Specialists  Pulmonary, Critical Care, and Sleep Medicine    Name: Camilo Davenport MRN: [id number]   : 1950 Hospital: [facility]    Date: 2021  Room: [facility]/[address] [place] [address] Note                                              Consult requesting physician: Dr. Veronica Ramos  Reason for Consult: Acute hypoxic respiratory failure    IMPRESSION:   ·   Patient Active Problem List   Diagnosis Code    Acute respiratory failure due to COVID-19 ([facility] [facility] U07.1, J96.00    Pneumonia due to COVID-19 virus U07.1, J12.82    HTN (hypertension) I10    Diabetes mellitus type 2, controlled ([facility] E11.9 ·   Anemia  · Code status: Full code   RECOMMENDATIONS:   Respiratory: Acute hypoxic respiratory failure due to diffuse severe COVID-19 pneumonia  CXR and ABG reviewed. HFNC vs PAP vs early intubation: Avoid CPAP/BiPAP. Data suggest against early intubation with SPO2 goal > 85% acceptable. C/w HFNC. Patient made aware of using high O2 can lead to oxygen toxicity and its consequences including pulmonary fibrosis. FiO2: 90%, flow 50 L/min  Proning: early prone positioning recommended. Patient remains undecided  If intubated, avoid early high PEEP if possible. Inhaled NO and prostacyclin may be beneficial, but not available at THE [facility]. Keep SPO2 >= 85%. HOB 30 degree elevation all the time. Aggressive pulmonary toileting. Aspiration precautions. Incentive spirometry when not intubated. CVS: Hemodynamically stable  Cardiac enzymes and pro BNP normal  Anticoagulation: Lovenox 30 mg every 12. If increasing d-dimer and suspected pulmonary microthrombi or VTE, consider full dose anticoagulation. PVL BL LE 21  · No evidence of deep vein thrombosis in the right lower extremity. · No evidence of deep vein thrombosis in the left lower extremity    ID: ID following  Severe COVID-19 pneumonia with hypoxic respiratory failure.   COVID-19 vaccine status: not taken  COVID-19 test: 11/3/21  Not candidate for antibodies such as Bamlanivimab, Regeneron or Casirivimib plus Imdevimab   Remdesivir: finished 5 days from 11/3/21  Dexamethasone: 10 mg daily  Actemra: 11/11/21  Monitor for cytokine surge  Difficulty enrolling in research trial.  Vit C, Vit D3, Thiamine 100 mg daily, Zinc sulfate 1 cap daily, Melatonin 5 mg Qhs. Avoid NSAID. Use tylenol for fever and pain. Rapid influenza test: Negative. Blood cx 11/3/21: Stapanthony Coag negative - likely contamination    Viral hepatitis panel: negative   HIV: negative   QuantiFERON-TB gold: negative  Daily CBC with diff, CMP, d-dimer, LDH, CRP. IL-6 (send out test and may not result timely) per clinical course    Hematology/Oncology: Monitor daily CBC, D-Dimer  Anticoagulation Lovenox 30 mg Q12   Renal: normal renal function  Hypovolemia due to fever and hyperventilation; and renal microthrombi which could occur in COVID19 infection; leading to renal failure and need for CRRT (which is not available at THE [facility]) or HD. Avoid hypervolemia. Gentle hydration and monitor volume status, UOP and renal functions closely. GI/: Monitor LFT daily. Endocrine: Monitor FSBS. SSI as needed  Neurology: AAOx 3, non-focal exam  Toxicology: none  Pain/Sedation: none  Skin/Wound: none3  Electrolytes: Replace electrolytes per ICU electrolyte replacement protocol. Nutrition: diet  Prophylaxis: DVT Prophylaxis: SCD/Lovenox. GI Prophylaxis: Protonix. Restraints: none  Lines/Tubes: PIV  Advance Directive/Palliative Care: full code, palliative care consulted per clinical course    Will defer respective systems problem management to primary and other respective consultant and follow patient in ICU with primary and other medical team.  Further recommendations will be based on the patient's response to recommended treatment and results of the investigation ordered. Quality Care: PPI, DVT prophylaxis, HOB elevated, Infection control all reviewed and addressed.     Care of plan d/w RN, RT, hospitalist team, ID  D/w patient (answered all questions to satisfaction). High complexity decision making was performed during the evaluation of this patient at high risk for decompensation with multiple organ involvement. Total critical care time spent rendering care exclusive of procedures/family discussion/coordination of care: 60 minutes. NOTE:  Positive COVID19 infection (Coronavirus), unknown disease course and outcome, consequences including but not limited to respiratory failure requiring ventilator use, cardiac failure and multiorgan failure leading to death, limited investigational treatment based on limited data which may or may not help and can be harmful. [id number] patients on ventilator support are being reported to have a poor prognosis with severe hypoxemia and/or viral cardiomyopathy related cardiac arrest, and high mortality compared to illness. The clinical spectrum seems to fluctuate in severity between patients, variant and also the clinical spectrum changes in the same patient. Medications such as Tocilizumab, Bamlanivimab, Regeneron or Casirivimib plus Imdevimab, Baricitinib plus Ramdesivir, anti-viral medications, plasma and other are investigational, and mostly EUA by FDA recommended, some have fallen out of favor due to lack of clinical benefit. Difficulty to enroll in clinical trials as research centers may not be located locally or easily accessible. Not all medications are easily available and response may vary from variants, patient to patient. These meds are being used based on experience from SARS-CoV infection in past experiences from [facility], [place], [facility] and other world Covid 19 cases. Certain management strategies are based on anecdotal evidence seen in COVID-19 patients.  Since it is a recent pandemic, multiple variants and evolving, many trials, studies and case reports are being reported early, and no specific consensus guidelines from big societies are evident. Patient discussion:  Patient is educated about COVID19 infection, spread of infection, unknown disease course and outcome, multiple variants, consequences including but not limited to respiratory failure requiring ventilator use, cardiac failure and multiorgan failure leading to death, limited investigational treatment based on limited data which may or may not help and can be harmful. She verbalized understanding and agreed to try investigational therapy with unknown outcome. The management strategies including medications and ventilator support are based on recommendations from center such as [address] [place] [address] [place], [facility], [place] [address], simin in COVID-19 patients, and medications and ventilator management strategies are evolving based on new data. Anticoagulation seems to be current standard of care for people with hypoxemic respiratory failure with COVID pneumonia. Dexamethasone (Recovery trial-[place]) or Solu-Medrol being recommended for treatment of COVID pneumonia needing oxygen supplementation. Proning is recommended for patients with hypoxemic respiratory failure. This provided treatment/management may cover potential off-label and compassionate use of medications and/or immunosuppressant for COVID19 positive patient in the context of a public health emergency related to Jose Martin Ordonez (a.k.a the Coronavirus) and in connection with the state of emergency declared by the [facility] [address] [place] [address]. Given emerging nature of this infection, further treatment could be underway simultaneously and may not be available to the provider. Some of the services which may be beneficial but are not available at THE [facility] facility (like inhaled nitric oxide, CRRT, ECMO). Some of the labs are not available and send out and may not be resulted on timely manner.  Recommended management may not guarantee outcome nor substitute for the practice of medicine for any medical society, clinical evidence based professional advice, work up or treatments and meant to adopt to each specific patient based on the current condition, presentation, consideration of the patient's needs, the resources available at the location from where the medical professional services are being provided and any other unique circumstances. The CDC federal and state guidelines have emphasized repeatedly the importance of vaccination to prevent severe infection, mortality, disabilities from [id number] with several SARS-CoV2 strains currently in the [place]. The treatment protocols are followed based on local hospital protocols, with guidance from centers such as [facility], [facility], other. The local hospital protocols have been guided by THE [facility] pharmacy department. THE [facility] is not part of any research protocol. Several trials with different medications are ongoing in the [place] and rest of the world, with changes being recommended. These changes are just recommendations, and may not be available at local facilities, and may not be approved by FDA for the [facility]. The ICU team are in constant communication with hospital pharmacy and administration, ID to provide best possible management for COVID 19 pneumonia patients in this hospital. This is discussed with the patient who verbalized understanding. NOTE  UPTODATE- REMDESIVIR  Dosing: Hepatic Impairment: Adult  Baseline hepatic impairment: There are no dosage adjustments provided (has not been studied); not recommended to be used in patients with baseline ALT ? 5 times the ULN (FDA 2020a). Hepatoxicity during therapy:  ALT ? 5 times the ULN: Discontinue remdesivir; may resume when ALT is <5 times the ULN (FDA 2020a). ALT elevation AND signs or symptoms of liver inflammation or increasing conjugated bilirubin, alkaline phosphatase, or INR: Discontinue remdesivir (FDA 2020a).     Upper limit of transaminases variable, and clinical context needed; first do no harm with medicationsespecially if the medications have not been established to be conclusively beneficial; consider risks of side effects versus potential benefits with medications such as remdesivir, tocilizumab in the setting of acute hepatitis. Remdesivir has been recently approved by FDA, but recent WHO trials failed to show mortality benefit. These meds are being used based on experience from [address] [place] [address] and global treatment protocols from various institutions. Positive COVID19 infection (Coronavirus), unknown disease course and outcome, multiple variants, consequences including but not limited to respiratory failure requiring ventilator use, cardiac failure and multiorgan failure leading to death, limited investigational treatment based on limited data which may or may not help, and may not change the prognosis. [id number] patients on ventilator support are being reported to have a poor prognosis with severe hypoxemia and/or viral cardiomyopathy related cardiac arrest, and high mortality compared to influenza illness. The clinical spectrum seems to fluctuate in severity between patients, variant to variant and also the clinical spectrum changes in the same patient. Tocilizumab, Bamlanivimab, Regeneron or Casirivimib plus Imdevimab, Baricitinib plus Ramdesivir, anti-viral medications, plasma and other are investigational, and mostly EUA by FDA recommended, some have fallen out of favor due to lack of clinical benefit. Difficulty to enroll in clinical trials as research centers may not be located locally or easily accessible. Subjective/History of Present Illness:   Patient is a 70 y.o. female with PMHx significant for HTN seen at the request of Dr. Yolanda Salinas to assist during ICU care for acute hypoxic respiratory failure with Covid19 pneumonia.  help provided through hospital used during this evaluation.   She is unvaccinated female admitted on 11/3/2021 with progressive shortness of breath, fever, body ache for at least 4 days prior to admission. In ER, noted to be hypoxic requiring supplemental oxygen and further testing suggested COVID-19 infection, multilobar pneumonia without PE on CTA chest.  Patient denies any family member with COVID-19 infection while historically multiple family members were diagnosed with COVID-19 infection per staff. She received remdesivir, steroid and anticoagulation prophylaxis. Her hospital course is significant for progressive increasing need of supplemental oxygen to HFNC when hospitalist transferred the patient to the ICU. Patient seen at bedside in room #353. She feels no worsening in her dyspnea compared to admission despite increased need for O2. The patient reports wet cough denies phlegm, chest pain, wheezing or hemoptysis, diarrhea, nausea, vomiting, change in the taste or smell. She has received today Actemra. Travel Hx: none  Exposure to suspected COVID19 patient: unknown  COVID19 vaccination: not taken    : Stan ID# [id number]    I/O last 24 hrs:      Intake/Output Summary (Last 24 hours) at 11/11/2021 1549  Last data filed at 11/11/2021 0853  Gross per 24 hour   Intake 240 ml   Output 600 ml   Net -360 ml       Review of Systems:  General ROS: negative for - fever, chills, weight loss  Psychological ROS: negative for - anxiety or depression  Ophthalmic ROS: negative for - eye pain, loss of vision or photophobia  ENT ROS: negative for - epistaxis, headaches, sinus pain or vocal changes  Allergy and Immunology ROS: negative for - hives or postnasal drip  Hematological and Lymphatic ROS: negative for - bleeding problems, blood clots, jaundice, pallor or swollen lymph nodes  Endocrine ROS: negative for - skin changes, temperature intolerance or unexpected weight changes  Cardiovascular ROS: negative for - chest pain, edema, murmur, orthopnea, palpitations or pnd  Gastrointestinal ROS: no nausea, vomiting, abdominal pain, change in bowel habits, or black or bloody stools  Genito-Urinary ROS: no dysuria, trouble voiding, or hematuria  Musculoskeletal ROS: negative for - muscle pain or muscular weakness  Neurological ROS: no TIA or stroke symptoms  Dermatological ROS: negative for - pruritus, rash or skin lesion changes     No Known Allergies     Past Medical History:   Diagnosis Date    Diabetes ([address]     HTN (hypertension)     Hyperlipidemia       History reviewed. No pertinent surgical history.    Social History     Tobacco Use    Smoking status: Never Smoker    Smokeless tobacco: Never Used   Substance Use Topics    Alcohol use: Yes      Family History   Problem Relation Age of Onset    Hypertension Mother       Prior to Admission medications    Not on File     Current Facility-Administered Medications   Medication Dose Route Frequency    insulin glargine (LANTUS) injection 10 Units  10 Units SubCUTAneous QHS    pantoprazole (PROTONIX) tablet 40 mg  40 mg Oral ACB    dexamethasone (DECADRON) 4 mg/mL injection 10 mg  10 mg IntraVENous Q24H    enoxaparin (LOVENOX) injection 30 mg  30 mg SubCUTAneous Q12H    cholecalciferol (VITAMIN D3) (1000 Units /25 mcg) tablet 2,000 Units  2,000 Units Oral DAILY    insulin lispro (HUMALOG) injection   SubCUTAneous AC&HS    sodium chloride (NS) flush 5-40 mL  5-40 mL IntraVENous Q8H    zinc sulfate (ZINCATE) 50 mg zinc (220 mg) capsule 1 Capsule  1 Capsule Oral DAILY    ascorbic acid (vitamin C) (VITAMIN C) tablet 500 mg  500 mg Oral BID    melatonin (rapid dissolve) tablet 10 mg  10 mg Oral QHS         Objective:   Vital Signs:    Visit Vitals  /60   Pulse 83   Temp 98.9 °F (37.2 °C)   Resp 18   Ht 5' 3\" (1.6 m)   Wt 78.7 kg (173 lb 8 oz)   SpO2 96%   BMI 30.73 kg/m²       O2 Device: Hi flow nasal cannula   O2 Flow Rate (L/min): 50 l/min   Temp (24hrs), Av.1 °F (36.7 °C), Min:97.5 °F (36.4 °C), Max:98.9 °F (37.2 °C)       Intake/Output:   Last shift:      701 - 1900  In: 240 [P.O.:240]  Out: 600 [Urine:600]    Last 3 shifts: 11/09 1901 - 11/11 0700  In: 1320 [P.O.:1320]  Out: -       Intake/Output Summary (Last 24 hours) at 11/11/2021 1549  Last data filed at 11/11/2021 0853  Gross per 24 hour   Intake 240 ml   Output 600 ml   Net -360 ml       Last 3 Recorded Weights in this Encounter    11/07/21 0128 11/08/21 0422 11/10/21 0723   Weight: 81 kg (178 lb 8 oz) 80.6 kg (177 lb 11.1 oz) 78.7 kg (173 lb 8 oz)         Recent Labs     11/11/21  1032   PHI 7.46*   PCO2I 39.3   PO2I 66*   HCO3I 27.8*   FIO2I 90       Physical Exam: Limitations in exam due to covid 19 isolation and full PPE requirement    General/Neurology: Alert, Awake, NAD. On HFNC  Head:   Normocephalic, without obvious abnormality, atraumatic. Eye:   EOM intact, no scleral icterus, no pallor, no cyanosis. Nose:   No sinus tenderness  Throat:  Lips, mucosa, and tongue normal. No oral thrush. Neck:   Supple, symmetric. No lymphadenopathy. Trachea midline  Lung: Moderate air entry bilateral equal. No rales. Few rhonchi BL. No wheezing. No stridors. No prolongded expiration. No accessory muscle use. Heart:   Regular rate & rhythm. S1 S2 present. No murmur. No JVD. Abdomen:  Soft. NT. ND. +BS. No masses. Extremities:  No pedal edema. No cyanosis. No clubbing. Pulses: 2+ and symmetric in DP. Capillary refill: normal  Lymphatic:  No cervical or supraclavicular palpable lymphadenopathy. Musculoskeletal: No joint swelling. No tenderness. Skin:   Color, texture, turgor normal. No rashes or lesions.       Data:       Recent Results (from the past 24 hour(s))   GLUCOSE, POC    Collection Time: 11/10/21  4:53 PM   Result Value Ref Range    Glucose (POC) 287 (H) 70 - 110 mg/dL   GLUCOSE, POC    Collection Time: 11/10/21  9:18 PM   Result Value Ref Range    Glucose (POC) 94 70 - 110 mg/dL   GLUCOSE, POC    Collection Time: 11/10/21  9:20 PM   Result Value Ref Range    Glucose (POC) 97 70 - 110 mg/dL   FERRITIN    Collection Time: 11/11/21  4:46 AM   Result Value Ref Range    Ferritin 630 (H) 8 - 388 NG/ML   CBC WITH AUTOMATED DIFF    Collection Time: 11/11/21  4:46 AM   Result Value Ref Range    WBC 11.6 4.6 - 13.2 K/uL    RBC 3.85 (L) 4.20 - 5.30 M/uL    HGB 10.4 (L) 12.0 - 16.0 g/dL    HCT 33.2 (L) 35.0 - 45.0 %    MCV 86.2 78.0 - 100.0 FL    MCH 27.0 24.0 - 34.0 PG    MCHC 31.3 31.0 - 37.0 g/dL    RDW 13.1 11.6 - 14.5 %    PLATELET 426 (H) 294 - 420 K/uL    MPV 10.7 9.2 - 11.8 FL    NEUTROPHILS 85 (H) 40 - 73 %    LYMPHOCYTES 6 (L) 21 - 52 %    MONOCYTES 4 3 - 10 %    EOSINOPHILS 0 0 - 5 %    BASOPHILS 0 0 - 2 %    ABS. NEUTROPHILS 9.9 (H) 1.8 - 8.0 K/UL    ABS. LYMPHOCYTES 0.6 (L) 0.9 - 3.6 K/UL    ABS. MONOCYTES 0.5 0.05 - 1.2 K/UL    ABS. EOSINOPHILS 0.0 0.0 - 0.4 K/UL    ABS. BASOPHILS 0.0 0.0 - 0.1 K/UL    DF AUTOMATED     METABOLIC PANEL, COMPREHENSIVE    Collection Time: 11/11/21  4:46 AM   Result Value Ref Range    Sodium 134 (L) 136 - 145 mmol/L    Potassium 4.4 3.5 - 5.5 mmol/L    Chloride 98 (L) 100 - 111 mmol/L    CO2 32 21 - 32 mmol/L    Anion gap 4 3.0 - 18 mmol/L    Glucose 355 (H) 74 - 99 mg/dL    BUN 18 7.0 - 18 MG/DL    Creatinine 0.86 0.6 - 1.3 MG/DL    BUN/Creatinine ratio 21 (H) 12 - 20      GFR est AA >60 >60 ml/min/1.73m2    GFR est non-AA >60 >60 ml/min/1.73m2    Calcium 8.0 (L) 8.5 - 10.1 MG/DL    Bilirubin, total 0.6 0.2 - 1.0 MG/DL    ALT (SGPT) 46 13 - 56 U/L    AST (SGOT) 28 10 - 38 U/L    Alk.  phosphatase 112 45 - 117 U/L    Protein, total 5.9 (L) 6.4 - 8.2 g/dL    Albumin 2.0 (L) 3.4 - 5.0 g/dL    Globulin 3.9 2.0 - 4.0 g/dL    A-G Ratio 0.5 (L) 0.8 - 1.7     CK    Collection Time: 11/11/21  4:46 AM   Result Value Ref Range    CK 67 26 - 192 U/L   C REACTIVE PROTEIN, QT    Collection Time: 11/11/21  4:46 AM   Result Value Ref Range    C-Reactive protein 3.3 (H) 0 - 0.3 mg/dL   GLUCOSE, POC    Collection Time: 11/11/21  6:26 AM   Result Value Ref Range    Glucose (POC) 313 (H) 70 - 110 mg/dL   BLOOD GAS, ARTERIAL POC Collection Time: 11/11/21 10:32 AM   Result Value Ref Range    Device: High Flow Nasal Cannula      FIO2 (POC) 90 %    pH (POC) 7.46 (H) 7.35 - 7.45      pCO2 (POC) 39.3 35.0 - 45.0 MMHG    pO2 (POC) 66 (L) 80 - 100 MMHG    HCO3 (POC) 27.8 (H) 22 - 26 MMOL/L    sO2 (POC) 93.6 92 - 97 %    Base excess (POC) 3.7 mmol/L    Site RIGHT RADIAL      Specimen type (POC) ARTERIAL      Performed by Lazarus App Ott[facility])    GLUCOSE, POC    Collection Time: 11/11/21 12:21 PM   Result Value Ref Range    Glucose (POC) 157 (H) 70 - 110 mg/dL         Chemistry Recent Labs     11/11/21 0446 11/10/21  0555 11/09/21 0440   * 189* 203*   * 136 135*   K 4.4 4.5 4.0   CL 98* 101 102   CO2 32 31 30   BUN 18 16 13   CREA 0.86 0.68 0.62   CA 8.0* 8.2* 8.4*   AGAP 4 4 3   BUCR 21* 24* 21*    102 96   TP 5.9* 5.8* 6.5   ALB 2.0* 2.0* 2.0*   GLOB 3.9 3.8 4.5*   AGRAT 0.5* 0.5* 0.4*        Lactic Acid No results found for: LAC  No results for input(s): LAC in the last 72 hours. Liver Enzymes Protein, total   Date Value Ref Range Status   11/11/2021 5.9 (L) 6.4 - 8.2 g/dL Final     Albumin   Date Value Ref Range Status   11/11/2021 2.0 (L) 3.4 - 5.0 g/dL Final     Globulin   Date Value Ref Range Status   11/11/2021 3.9 2.0 - 4.0 g/dL Final     A-G Ratio   Date Value Ref Range Status   11/11/2021 0.5 (L) 0.8 - 1.7   Final     Alk.  phosphatase   Date Value Ref Range Status   11/11/2021 112 45 - 117 U/L Final     Recent Labs     11/11/21  0446 11/10/21  0555 11/09/21 0440   TP 5.9* 5.8* 6.5   ALB 2.0* 2.0* 2.0*   GLOB 3.9 3.8 4.5*   AGRAT 0.5* 0.5* 0.4*    102 96        CBC w/Diff Recent Labs     11/11/21  0446 11/10/21  0555 11/09/21  0440   WBC 11.6 12.9 11.1   RBC 3.85* 3.80* 3.84*   HGB 10.4* 10.2* 10.1*   HCT 33.2* 33.3* 33.3*   * 439* 391   GRANS 85* 89* 89*   LYMPH 6* 6* 6*   EOS 0 0 0        Cardiac Enzymes Lab Results   Component Value Date/Time    CPK 67 11/11/2021 04:46 AM        BNP No results found for: BNP, BNPP, XBNPT     Coagulation No results for input(s): PTP, INR, APTT, INREXT in the last 72 hours. Thyroid  No results found for: T4, T3U, TSH, TSHEXT    No results found for: T4     Urinalysis No results found for: COLOR, APPRN, SPGRU, REFSG, [place], PROTU, GLUCU, KETU, BILU, UROU, STAR, LEUKU, GLUKE, EPSU, BACTU, WBCU, RBCU, CASTS, UCRY     Micro  No results for input(s): SDES, CULT in the last 72 hours. No results for input(s): CULT in the last 72 hours. Culture data during this hospitalization. All Micro Results     Procedure Component Value Units Date/Time    CULTURE, BLOOD [[id number]]  (Abnormal) Collected: 11/03/21 1746    Order Status: Completed Specimen: Blood Updated: 11/05/21 1227     Special Requests: NO SPECIAL REQUESTS        GRAM STAIN       AEROBIC BOTTLE GRAM POSITIVE COCCI IN CLUSTERS                  SMEAR CALLED TO AND CORRECTLY REPEATED BY: Luis Alfredo Bello RN 3N BY CAM ON 11/4/21 AT 1233 [address] GRAM POSITIVE COCCI IN CLUSTERS ANAEROBIC BOTTLE                  SMEAR CALLED TO AND CORRECTLY REPEATED BY: Jonelle Camejo RN 3N AT 1500 ON 11/04/2021 TO [facility].            Culture result:       STAPHYLOCOCCUS SPECIES, COAGULASE NEGATIVE MULTIPLE COLONY TYPES/STRAINS GROWING IN THE AEROBIC AND ANAEROBIC BOTTLES NO SITE INDICATED          LEGIONELLA PNEUMOPHILA AG, URINE [[id number]] Collected: 11/03/21 2045    Order Status: Canceled Specimen: Urine     STREP Oscar John, URINE [[id number]] Collected: 11/03/21 2045    Order Status: Canceled Specimen: Urine     CULTURE, RESPIRATORY/SPUTUM/BRONCH Danay Neha STAIN [[id number]] Collected: 11/03/21 2045    Order Status: Canceled Specimen: Sputum     INFLUENZA A & B AG (RAPID TEST) [[id number]] Collected: 11/03/21 1813    Order Status: Completed Specimen: Nasopharyngeal from Nasal washing Updated: 11/03/21 1843     Influenza A Antigen Negative        Comment: A negative result does not exclude influenza virus infection, seasonal or H1N1 due to suboptimal sensitivity. If influenza is circulating in your community, a diagnosis of influenza should be considered based on a patients clinical presentation and empiric antiviral treatment should be considered, if indicated. Influenza B Antigen Negative       COVID-19 RAPID TEST [[id number]]  (Abnormal) Collected: 11/03/21 1813    Order Status: Completed Specimen: Nasopharyngeal Updated: 11/03/21 1840     Specimen source Nasopharyngeal        COVID-19 rapid test Detected        Comment:      The specimen is POSITIVE for SARS-CoV-2, the novel coronavirus associated with COVID-19. This test has been authorized by the FDA under an Emergency Use Authorization (EUA) for use by authorized laboratories. Fact sheet for Healthcare Providers: [employer]  Fact sheet for Patients: [employer]fi       Methodology: Isothermal Nucleic Acid Amplification  CALLED TO AND CORRECTLY REPEATED BY:  Romana TAY IN ER AT [address] [place] ON 11/03/2021 TO [facility]. Images report reviewed by me:  CT (Most Recent) (CT chest reviewed by me) Results from Hospital Encounter encounter on 11/03/21    CTA CHEST W OR W WO CONT    Narrative  EXAM: CTA chest    INDICATION: Pain. COMPARISON: November 3, 2021. TECHNIQUE: Axial CT imaging from the thoracic inlet through the diaphragm with  intravenous contrast. Coronal and sagittal MIP reformats were generated. Dose  reduction techniques used: automated exposure control, adjustment of the mAs  and/or kVp according to patient size, and iterative reconstruction techniques. Digital imaging and communications in Medicine (DICOM) format image data are  available to nonaffiliated external healthcare facilities or entities on a  secure, media free, reciprocally searchable basis with patient authorization for  at least 12 months after this study. _______________    FINDINGS:    EXAM QUALITY: Adequate.     PULMONARY ARTERIES: No evidence of pulmonary embolism. MEDIASTINUM: Normal heart size. No evidence of right heart strain. Aorta is  unremarkable. No pericardial effusion. LUNGS: There are again seen numerous bilateral multilobar groundglass  infiltrates. PLEURA: There are small bilateral pleural effusions. AIRWAY: Normal.    LYMPH NODES: No enlarged nodes. UPPER ABDOMEN: Unremarkable. OTHER: No acute or aggressive osseous abnormalities identified. _______________    Impression  1. No evidence of pulmonary embolism. 2.  Numerous bilateral multilobar groundglass infiltrates again seen. 3. Small bilateral pleural effusions. CXR reviewed by me:  XR (Most Recent). CXR  reviewed by me and compared with previous CXR Results from Hospital Encounter encounter on 11/03/21    XR CHEST PORT    Narrative  CHEST AP PORTABLE    Indication: Worsening hypoxia, Covid pneumonia. Comparison: X-ray 11/07/2021. Findings: Shallow inspiration. There is persistent bilateral diffuse reticular  and patchy alveolar opacities with lower lung zone predominance, stable to  slightly increased from comparison exam. For example left lung and disease in  right upper lung zone appears slightly more conspicuous. The cardiac silhouette  and pulmonary vascularity appear within normal limits. No evidence for  pneumothorax or pleural effusion. Impression  Bilateral diffuse disease, stable to slightly increased from comparison exam.           ·Please note: Voice-recognition software may have been used to generate this report, which may have resulted in some phonetic-based errors in grammar and contents. Even though attempts were made to correct all the mistakes, some may have been missed, and remained in the body of the document.       Jacqueline Shahid MD  11/11/2021

## 2021-11-11 NOTE — ROUTINE PROCESS
Bedside and Verbal shift change report given to Mara Essex, RN (oncoming nurse) by Candida Rose RN (offgoing nurse). Report included the following information SBAR, Kardex, ED Summary, Intake/Output, MAR, Recent Results, Med Rec Status and Cardiac Rhythm NSR.     1150 Patient assessment completed. Patient is resting quietly with eyes wide open and chest rising and falling evenly. No complaints of pain. Call bell is within reach. 1400 Patients O2 stats were in the low 80s. When I went to go assess, the patient did not have her nasal cannula in. Once it was put back in the patient went up to 90s. 2115 Patients O2 was 82. Went in to assess the patient and patient had her nasal cannula on. The patient was placed on a non-rebreather and o2 went back up to 94.

## 2021-11-11 NOTE — PROGRESS NOTES
Tocilizumab Consult for Betty Phelps a 70 y.o. female. Consulted by Dr. Lisa Sahu. The following criteria was confirmed by the information found in the patient's chart and compared to [facility] Tocilizumab criteria for use reference. Clinical status: 11 days since symptom onset, 8 days since hospitalization     Concomitant therapy: dexamethasone 10 mg IV daily    Contraindications: MD aware of clinical status criteria - continue with therapy     Patient weight: 78.8 kg     Order places for Actemra 600 mg IV x once dose to be given starting 1200 @11/11/21.

## 2021-11-11 NOTE — PROGRESS NOTES
Hospitalist Progress Note-critical care note     Patient: Barby Mack MRN: [id number]  CSN: [id number]    YOB: 1950  Age: 70 y.o. Sex: female    DOA: 11/3/2021 LOS:  LOS: 8 days            Chief complaint: covid 19 pnam, htn . DM     Assessment/Plan         Hospital Problems  Date Reviewed: 11/3/2021          Codes Class Noted POA    Acute respiratory failure due to COVID-19 [facility]) ICD-10-CM: U07.1, J96.00  ICD-9-CM: 518.81, 079.89  11/3/2021 Unknown        * (Principal) Pneumonia due to COVID-19 virus ICD-10-CM: U07.1, J12.82  ICD-9-CM: 480.8, 079.89  11/3/2021 Unknown        HTN (hypertension) ICD-10-CM: I10  ICD-9-CM: 401.9  11/3/2021 Unknown        Diabetes mellitus type 2, controlled ([facility] [address] ICD-10-CM: E11.9  ICD-9-CM: 250.00  11/3/2021 Unknown             acute respiratory failure with hypoxia   need high flow 50 L , 90 % oxyg, abg hypoxia   Case discussed with Dr. Anel Giordano   Will transfer to icu          Pneumonia due to Covid with hypoxia,  Continue iv deca drone, ID f/u -planning [place]   Completed remdesivir  Continue supportive treatment   duplex of lower extremity neg for DVT  lovenox BID for DVT prevention  Ferritin trending down     Hyperglycemia/diabetes:   Continue lantus 10  SSI PRN add premeal insulin     Hypertension  Continue hydralazine as needed     Mild increase in liver enzymes,   Due to viral infection   lft wnl          Hypokalemia-resolved     Pt was seen and examed in full PPE     Son was updated for pt's condition   35 min critical time   Live video  used during the visit   Code status discussed with her.      Subjective: sob , I want to go home to take care of sick hudsband    She is not vaccinated     Disposition :tbd,   Review of systems:  General: no fever /no chills   Lung :+ sob, no wheezing   Heart : no chest pain , no palpitation   Gi: no abdomen pain, no n/v     Vital signs/Intake and Output:  Visit Vitals  /60   Pulse 83   Temp 98.9 °F (37.2 °C)   Resp 18   Ht 5' 3\" (1.6 m)   Wt 78.7 kg (173 lb 8 oz)   SpO2 96%   BMI 30.73 kg/m²     Current Shift:  11/11 0701 - 11/11 1900  In: 240 [P.O.:240]  Out: 600 [Urine:600]  Last three shifts:  11/09 1901 - 11/11 0700  In: 1320 [P.O.:1320]  Out: -     Physical Exam:  General: WD, WN. Alert, cooperative, in distress    HEENT: NC, Atraumatic. PERRLA, anicteric sclerae. Nc oxygen   Lungs: CTA Bilaterally. No Wheezing/Rhonchi/Rales. Heart:  Regular  rhythm,  No murmur, No Rubs, No Gallops  Abdomen: Soft, Non distended, Non tender. +Bowel sounds,   Extremities: No c/c/e  Psych:   Not anxious or agitated. Neurologic:  No acute neurological deficit. Labs: Results:       Chemistry Recent Labs     11/11/21  0446 11/10/21  0555 11/09/21  0440   * 189* 203*   * 136 135*   K 4.4 4.5 4.0   CL 98* 101 102   CO2 32 31 30   BUN 18 16 13   CREA 0.86 0.68 0.62   CA 8.0* 8.2* 8.4*   AGAP 4 4 3   BUCR 21* 24* 21*    102 96   TP 5.9* 5.8* 6.5   ALB 2.0* 2.0* 2.0*   GLOB 3.9 3.8 4.5*   AGRAT 0.5* 0.5* 0.4*      CBC w/Diff Recent Labs     11/11/21  0446 11/10/21  0555 11/09/21  0440   WBC 11.6 12.9 11.1   RBC 3.85* 3.80* 3.84*   HGB 10.4* 10.2* 10.1*   HCT 33.2* 33.3* 33.3*   * 439* 391   GRANS 85* 89* 89*   LYMPH 6* 6* 6*   EOS 0 0 0      Cardiac Enzymes Recent Labs     11/11/21 0446   CPK 67      Coagulation No results for input(s): PTP, INR, APTT, INREXT, INREXT in the last 72 hours. Lipid Panel No results found for: CHOL, CHOLPOCT, CHOLX, CHLST, CHOLV, 204550, HDL, HDLP, LDL, LDLC, DLDLP, 231096, VLDLC, VLDL, TGLX, TRIGL, TRIGP, TGLPOCT, CHHD, CHHDX   BNP No results for input(s): BNPP in the last 72 hours.    Liver Enzymes Recent Labs     11/11/21  0446   TP 5.9*   ALB 2.0*         Thyroid Studies No results found for: T4, T3U, TSH, TSHEXT, TSHEXT     Procedures/imaging: see electronic medical records for all procedures/Xrays and details which were not copied into this note but were reviewed prior to creation of Plan    CTA CHEST W OR W WO CONT    Result Date: 11/8/2021  EXAM: CTA chest INDICATION: Pain. COMPARISON: November 3, 2021. TECHNIQUE: Axial CT imaging from the thoracic inlet through the diaphragm with intravenous contrast. Coronal and sagittal MIP reformats were generated. Dose reduction techniques used: automated exposure control, adjustment of the mAs and/or kVp according to patient size, and iterative reconstruction techniques. Digital imaging and communications in Medicine (DICOM) format image data are available to nonaffiliated external healthcare facilities or entities on a secure, media free, reciprocally searchable basis with patient authorization for at least 12 months after this study. _______________ FINDINGS: EXAM QUALITY: Adequate. PULMONARY ARTERIES: No evidence of pulmonary embolism. MEDIASTINUM: Normal heart size. No evidence of right heart strain. Aorta is unremarkable. No pericardial effusion. LUNGS: There are again seen numerous bilateral multilobar groundglass infiltrates. PLEURA: There are small bilateral pleural effusions. AIRWAY: Normal. LYMPH NODES: No enlarged nodes. UPPER ABDOMEN: Unremarkable. OTHER: No acute or aggressive osseous abnormalities identified. _______________     1. No evidence of pulmonary embolism. 2.  Numerous bilateral multilobar groundglass infiltrates again seen. 3. Small bilateral pleural effusions. CTA CHEST W OR W WO CONT    Result Date: 11/3/2021  EXAM: CTA chest CLINICAL INDICATION/HISTORY: COVID pneumonia, shortness of breath COMPARISON: Chest radiograph from the same day TECHNIQUE: Axial CT imaging from the thoracic inlet through the diaphragm with intravenous contrast. Coronal and sagittal MIP reformats were generated. One or more dose reduction techniques were used on this CT: automated exposure control, adjustment of the mAs and/or kVp according to patient size, and iterative reconstruction techniques.   The specific techniques used on this CT exam have been documented in the patient's electronic medical record. Digital Imaging and Communications in Medicine (DICOM) format image data are available to nonaffiliated external healthcare facilities or entities on a secured, media free, reciprocally searchable basis with patient authorization for at least a 12-month period after this study. _______________ FINDINGS: EXAM QUALITY: Quit PULMONARY ARTERIES: No pulmonary embolism identified. MEDIASTINUM: Normal heart size. Aorta is unremarkable. No pericardial effusion. LUNGS: Bilateral multilobar groundglass infiltrates secondary to COVID 19 pneumonia. AIRWAY: Normal. PLEURA: Normal. LYMPH NODES: No enlarged nodes. UPPER ABDOMEN: Unremarkable. BONES: No acute or aggressive osseous abnormalities identified. OTHER: None. _______________     1. No pulmonary embolism identified. 2. Bilateral multilobar groundglass infiltrates secondary to COVID 19 pneumonia. US ABD LTD    Result Date: 11/4/2021  EXAM: Limited right upper quadrant abdominal ultrasound INDICATION: Elevated liver function tests. COMPARISON: CT angiogram of the chest 11/3/2021. TECHNIQUE: Real-time abdomen/right upper quadrant sonography in multiple planes was performed with image documentation. Grayscale, color flow Doppler imaging, and velocity spectral waveform analysis of the portal vein was performed (duplex imaging). _______________ FINDINGS: LIVER: Mildly increased hepatic parenchymal echogenicity is present. No focal mass Color flow Doppler and velocity spectral waveform analysis of the portal vein shows normal (hepatopedal) direction of flow. [place] BILIARY SYSTEM: No intrahepatic biliary dilatation. Common bile duct is normal in caliber measuring 0.4 cm. GALLBLADDER: No gallstones or gallbladder wall thickening. No pericholecystic fluid. RIGHT KIDNEY: 8.6 cm in length. No hydronephrosis or renal mass. No visible calculi.  PANCREAS: Head and body are unremarkable in appearance though the tail is obscured by overlying bowel gas. IVC: Visualized portions are unremarkable in appearance. OTHER: No free intraperitoneal fluid. _______________     Mildly increased hepatic parenchymal echotexture which can be seen in the context of steatosis and/or hepatocellular dysfunction. No biliary ductal dilatation or mass lesion. XR CHEST PORT    Result Date: 11/7/2021  EXAM: XR CHEST PORT CLINICAL INDICATION/HISTORY: Worsening hypoxia   > Additional: None. COMPARISON: November 3, 2021 TECHNIQUE: Portable chest _______________ FINDINGS: SUPPORT DEVICES: None. HEART AND MEDIASTINUM: The heart is stable in size and contour. LUNGS AND PLEURAL SPACES: Extensive multifocal airspace opacities and consolidation are seen diffusely throughout the lungs little interval change. BONY THORAX AND SOFT TISSUES: No acute osseous abnormality. _______________     Extensive multifocal airspace opacities similar to prior study. XR CHEST PORT    Result Date: 11/3/2021  EXAM: CHEST RADIOGRAPH CLINICAL INDICATION/HISTORY: meets SIRS criteria   > Additional: Short of breath and cough COMPARISON: None TECHNIQUE: Portable frontal view of the chest _______________ FINDINGS: SUPPORT DEVICES: None. HEART AND MEDIASTINUM: Heart size is normal. LUNGS AND PLEURAL SPACES: Patchy bilateral consolidations. No pleural effusion or pneumothorax. BONES AND SOFT TISSUES: Unremarkable. _______________     Patchy bilateral consolidations could relate to COVID 19 pneumonia. ECHO ADULT COMPLETE    Result Date: 11/4/2021  · Left Ventricle: Normal cavity size, wall thickness and systolic function (ejection fraction normal). The estimated EF is 60 - 65%. There is mild (grade 1) left ventricular diastolic dysfunction. Wall Scoring: The left ventricular wall motion is normal. · Tricuspid Valve: Normal valve structure and no stenosis.  Tricuspid regurgitation is inadequate for estimation of right ventricular systolic pressure. DUPLEX LOWER EXT VENOUS BILAT    Result Date: 11/5/2021  · No evidence of deep vein thrombosis in the right lower extremity. · No evidence of deep vein thrombosis in the left lower extremity.         Neil Wolff MD

## 2021-11-11 NOTE — DIABETES MGMT
Diabetes/ Glycemic Control Plan of Care  Recommendations:  Recommend increasing Lantus to 15  units q 24 hrs. Assessment: TDD insulin x last 24 hours: 30 units (10 units lantus  + 20 unit lispro)      Fasting/ Morning blood glucose:   Lab Results   Component Value Date/Time    Glucose 355 (H) 11/11/2021 04:46 AM    Glucose (POC) 157 (H) 11/11/2021 12:21 PM         Plan/Goals:   Blood glucose will be within target of 70 - 180 mg/dl within 72 hours  Reinforce dietary and medication compliance at home.          Education:  [] Refer to Diabetes Education Record                       [x] Education not indicated at this time        Gloria Kelley, [address] [place] [address]  Glycemic Control Team  [phone]

## 2021-11-11 NOTE — PROGRESS NOTES
abg hypoxia, transfer to icu- discussed with emmy gary and Dr. Rosenda Sorensen and dr. Greta Sewell

## 2021-11-11 NOTE — PROGRESS NOTES
[facility]  (A Division of [address] [place] [address])                                                                                                                    Kayy Benavides MD  Office #: [phone] Option # [phone]  Fax #: [phone]     Date of Admission: 11/3/2021Date of Note: 11/11/2021  Reason for FU: Evaluation and antibiotic management of CPVID 19 infection  . Current Antimicrobials:    Prior Antimicrobials:  Dexamethasone 11/3 to date #7     Remdesivir 11/3 to date#5       Assessment- ID related:  --------------------------------------------------------------------------  · Acute hypoxic respiratory failure PaO2 50 on ABG:  Worse on HNC  CTA chest 11/3 and 11/7- no PE  · Viral PNA- Bilateral- Patchy infiltrate by CXR and B/L multilobular GGO on CT  · COVId 19 infection  S/P remdesvir  · Rhabdo 2/2 viral infection with transaminitis: Improving  · CoNS bacteremia- likely contaminant    COVId rapid + 11/3  Influenza A antigen : negative  Procal X2 NL  DD elevated to 7.84-> 2.28  CRP 12.7-> 14.5-> 8.8  Ferretin 02488-> 2953    IL 6 elevated 11/3  HIV 1/2- negative  Hepatitis C ab/ Hep B -negative. TB serology: Negative    Improving bio-markers while worsening hypoxia overnight, need further evaluation. Other Medical Issues- Mx per respective team:  · HTN  · Hyperlipidemia  · DM     Recommendation for ID issues I am following:  --------------------------------------------------------------------------  -cont dexamethasone   - progressed to HFNC overnight  DW via  regarding Tociluzumab/barcitinab and side effects. She agrees for treatment.  Order placed to pharmacy for Tociluzuma    -> Biomarkers FU, stressed proning  -> CXR portable ordered stat  -> Vit complex/resp support with O2 per primary    DW with Dr Paul Franco, her nurse  Her decline progress worrisome, high risk for intubation on current course and ICU transfer           The following information was discussed with patient or caregiver regarding the use of tocilizumab, which is under Emergency Use Authorization:     FDA has authorized the emergency use of tocilizumab to treat COVID-19 in hospitalized adults who are receiving systemic corticosteroids and require supplemental oxygen, non-invasive or invasive mechanical ventilation, or ECMO. This is not an FDA-approved use of tocilizumab.  The patient or parent/caregiver has the option to accept or refuse tocilizumab.  The significant known and potential risks and benefits of tocilizumab, and the extent of which the potential risks and benefits are unknown.  Information on available alternative treatments and the risks and benefits of those alternatives, including clinical trials. The information is consistent with the EUA Fact Sheet for Patients, Parents and Caregivers and a copy of the sheet was made available to patient and/or caregiver. Subjective:  Afebrile, WBC at 11.69K, biomarkers decline curve. Labs pending from this am  Hypoxia worsened on 6 L last night, currently on 50L HfNC. She was proning  States she feels fine, no cough/SOB/Chest pain via [employer]   -Denies N/V/abd pain.  -wondering about her sick  and if she can go home to see him. Reviewed with her the severity of her hypoxia and not ready for plans to DC  DW her about IL6/ WALTER 2 inhibitors - she consents to treatment  Labs/notes reviewed        HPI:  Andrew Barrios is a 70 y.o. BLACK/ who is unvaccinated with PMH as listed below with limited communication due to [address] barrier. Admitted on 11/3 with hypoxia/symptoms of SOB and fever and body aches X4 days. Was hypoxic to 86% on RA-- apparently with muliplte family members sick with COVID 19 too. Found with fever, B/L lung infiltrate on CXR and CTA chest without PE, elevared D-dimer, high ferretin and CRP.  Oxygen demand has been 4litres and is better down to 2 L at exam today.    Currently on Remdesvir/dexamethasone. Live Pfeiffer [address] from admission 1 set + for [address]. Active Hospital Problems    Diagnosis Date Noted    Acute respiratory failure due to COVID-19 [facility]) 11/03/2021    Pneumonia due to COVID-19 virus 11/03/2021    HTN (hypertension) 11/03/2021    Diabetes mellitus type 2, controlled ([facility] [address] 11/03/2021     Past Medical History:   Diagnosis Date    Diabetes ([facility] [address] [facility]     HTN (hypertension)     Hyperlipidemia      History reviewed. No pertinent surgical history. Family History   Problem Relation Age of Onset    Hypertension Mother      Social History     Socioeconomic History    Marital status:      Spouse name: Not on file    Number of children: Not on file    Years of education: Not on file    Highest education level: Not on file   Occupational History    Not on file   Tobacco Use    Smoking status: Never Smoker    Smokeless tobacco: Never Used   Substance and Sexual Activity    Alcohol use: Yes    Drug use: Never    Sexual activity: Not Currently   Other Topics Concern    Not on file   Social History Narrative    Not on file     Social Determinants of Health     Financial Resource Strain:     Difficulty of Paying Living Expenses: Not on file   Food Insecurity:     Worried About Running Out of Food in the Last Year: Not on file    Aston of Food in the Last Year: Not on file   Transportation Needs:     Lack of Transportation (Medical): Not on file    Lack of Transportation (Non-Medical):  Not on file   Physical Activity:     Days of Exercise per Week: Not on file    Minutes of Exercise per Session: Not on file   Stress:     Feeling of Stress : Not on file   Social Connections:     Frequency of Communication with Friends and Family: Not on file    Frequency of Social Gatherings with Friends and Family: Not on file    Attends Cheondoism Services: Not on file    Active Member of Clubs or Organizations: Not on file    Attends Club or Organization Meetings: Not on file    Marital Status: Not on file   Intimate Partner Violence:     Fear of Current or Ex-Partner: Not on file    Emotionally Abused: Not on file    Physically Abused: Not on file    Sexually Abused: Not on file   Housing Stability:     Unable to Pay for Housing in the Last Year: Not on file    Number of [place] in the Last Year: Not on file    Unstable Housing in the Last Year: Not on file       Allergies:  Patient has no known allergies.      Medications:  Current Facility-Administered Medications   Medication Dose Route Frequency    insulin glargine (LANTUS) injection 10 Units  10 Units SubCUTAneous QHS    pantoprazole (PROTONIX) tablet 40 mg  40 mg Oral ACB    dexamethasone (DECADRON) 4 mg/mL injection 10 mg  10 mg IntraVENous Q24H    loperamide (IMODIUM) capsule 4 mg  4 mg Oral Q4H PRN    sodium chloride (NS) flush 5-10 mL  5-10 mL IntraVENous PRN    enoxaparin (LOVENOX) injection 30 mg  30 mg SubCUTAneous Q12H    acetaminophen (TYLENOL) tablet 650 mg  650 mg Oral Q6H PRN    Or    acetaminophen (TYLENOL) suppository 650 mg  650 mg Rectal Q6H PRN    guaiFENesin-dextromethorphan (ROBITUSSIN DM) 100-10 mg/5 mL syrup 5 mL  5 mL Oral Q4H PRN    cholecalciferol (VITAMIN D3) (1000 Units /25 mcg) tablet 2,000 Units  2,000 Units Oral DAILY    insulin lispro (HUMALOG) injection   SubCUTAneous AC&HS    glucose chewable tablet 16 g  4 Tablet Oral PRN    glucagon (GLUCAGEN) injection 1 mg  1 mg IntraMUSCular PRN    dextrose (D50W) injection syrg 12.5-25 g  25-50 mL IntraVENous PRN    sodium chloride (NS) flush 5-40 mL  5-40 mL IntraVENous Q8H    sodium chloride (NS) flush 5-40 mL  5-40 mL IntraVENous PRN    polyethylene glycol (MIRALAX) packet 17 g  17 g Oral DAILY PRN    ondansetron (ZOFRAN ODT) tablet 4 mg  4 mg Oral Q8H PRN    Or    ondansetron (ZOFRAN) injection 4 mg  4 mg IntraVENous Q6H PRN    zinc sulfate (ZINCATE) 50 mg zinc (220 mg) capsule 1 Capsule  1 Capsule Oral DAILY    ascorbic acid (vitamin C) (VITAMIN C) tablet 500 mg  500 mg Oral BID    melatonin (rapid dissolve) tablet 10 mg  10 mg Oral QHS    hydrALAZINE (APRESOLINE) 20 mg/mL injection 10 mg  10 mg IntraVENous Q6H PRN          Physical Exam:    Temp (24hrs), Av.3 °F (36.8 °C), Min:97.5 °F (36.4 °C), Max:99.9 °F (37.7 °C)    Visit Vitals  BP (!) 126/55   Pulse 87   Temp 98 °F (36.7 °C)   Resp 18   Ht 5' 3\" (1.6 m)   Wt 78.7 kg (173 lb 8 oz)   SpO2 95%   BMI 30.73 kg/m²          GEN: WD Pbese, on her side. On 8L of oxygen via NC  HEENT: Unicteric. EOMI intact  CHEST: Non laboured breathing. No wheezes, no rales  ABD: Obese/soft. Non tender. PEPE: Deferred  EXT: No apparent swelling or redness on UE/LE joints. Skin: Dry and intact. No rash, no redness. CNS: A, OX3. Moves all extremity. CN grossly ok. Microbiology  All Micro Results     Procedure Component Value Units Date/Time    CULTURE, BLOOD [[id number]]  (Abnormal) Collected: 21 1746    Order Status: Completed Specimen: Blood Updated: 21 1227     Special Requests: NO SPECIAL REQUESTS        GRAM STAIN       AEROBIC BOTTLE GRAM POSITIVE COCCI IN CLUSTERS                  SMEAR CALLED TO AND CORRECTLY REPEATED BY: Lonnie Tellez RN 3N BY CAM ON 21 AT 12[address]. GRAM POSITIVE COCCI IN CLUSTERS ANAEROBIC BOTTLE                  SMEAR CALLED TO AND CORRECTLY REPEATED BY: Jing Mccoy RN 3N AT 1500 ON 2021 TO [address]            Culture result:       STAPHYLOCOCCUS SPECIES, COAGULASE NEGATIVE MULTIPLE COLONY TYPES/STRAINS GROWING IN THE AEROBIC AND ANAEROBIC BOTTLES NO SITE INDICATED          LEGIONELLA PNEUMOPHILA AG, URINE [[id number]] Collected: 21    Order Status: Canceled Specimen: Urine     STREP Enmanuel Ambrocio, URINE [[id number]] Collected: 21    Order Status: Canceled Specimen: Urine     CULTURE, RESPIRATORY/SPUTUM/BRONCH Radha Ban [[id number]] Collected: 21    Order Status: Canceled Specimen: Sputum INFLUENZA A & B AG (RAPID TEST) [[id number]] Collected: 11/03/21 1813    Order Status: Completed Specimen: Nasopharyngeal from Nasal washing Updated: 11/03/21 1843     Influenza A Antigen Negative        Comment: A negative result does not exclude influenza virus infection, seasonal or H1N1 due to suboptimal sensitivity. If influenza is circulating in your community, a diagnosis of influenza should be considered based on a patients clinical presentation and empiric antiviral treatment should be considered, if indicated. Influenza B Antigen Negative       COVID-19 RAPID TEST [[id number]]  (Abnormal) Collected: 11/03/21 1813    Order Status: Completed Specimen: Nasopharyngeal Updated: 11/03/21 1840     Specimen source Nasopharyngeal        COVID-19 rapid test Detected        Comment:      The specimen is POSITIVE for SARS-CoV-2, the novel coronavirus associated with COVID-19. This test has been authorized by the FDA under an Emergency Use Authorization (EUA) for use by authorized laboratories. Fact sheet for Healthcare Providers: ConventionUpdate.co.nz  Fact sheet for Patients: ConventionUpdate.co.nz       Methodology: Isothermal Nucleic Acid Amplification  CALLED TO AND CORRECTLY REPEATED BY:  Edd TAY IN ER  [place] ON 11/03/2021 TO [facility].                   Lines / Catheters:  Lab results:    Chemistry  Recent Labs     11/11/21  0446 11/10/21  0555 11/09/21  0440   * 189* 203*   * 136 135*   K 4.4 4.5 4.0   CL 98* 101 102   CO2 32 31 30   BUN 18 16 13   CREA 0.86 0.68 0.62   CA 8.0* 8.2* 8.4*   AGAP 4 4 3   BUCR 21* 24* 21*    102 96   TP 5.9* 5.8* 6.5   ALB 2.0* 2.0* 2.0*   GLOB 3.9 3.8 4.5*   AGRAT 0.5* 0.5* 0.4*       CBC w/ Diff  Recent Labs     11/11/21  0446 11/10/21  0555 11/09/21  0440   WBC 11.6 12.9 11.1   RBC 3.85* 3.80* 3.84*   HGB 10.4* 10.2* 10.1*   HCT 33.2* 33.3* 33.3*   * 439* 391   GRANS 85* 89* 89*   LYMPH 6* 6* 6*   EOS 0 0 0       Imaging: report posted below as per radiologist - reports reviewed in Epic  Total duration of time spent with patient interview and exam and discussion of plan of care, review of chart in care everywhere, discussion with medical staff- nursing/attending and additional specialities as indicated: 33  minutes

## 2021-11-11 NOTE — PROGRESS NOTES
11/11/2021  PT treatment not completed due to:  [] Off Unit for testing/procedure  [] Pain  [] Eating  [] Patient too lethargic  [] Nausea/vomiting  [] Dialysis treatment in progress   [x] Other: pt for transfer to ICU due to abg hypoxia. Discussed with nurse Taylor and made aware of hold PT at this time. Will f/u as appropriate. Thank you.    Severiano Bayley, PT

## 2021-11-11 NOTE — PROGRESS NOTES
Problem: Risk for Spread of Infection  Goal: Prevent transmission of infectious organism to others  Description: Prevent the transmission of infectious organisms to other patients, staff members, and visitors. Outcome: Progressing Towards Goal     Problem: Patient Education:  Go to Education Activity  Goal: Patient/Family Education  Outcome: Progressing Towards Goal     Problem: Airway Clearance - Ineffective  Goal: Achieve or maintain patent airway  Outcome: Progressing Towards Goal     Problem: Gas Exchange - Impaired  Goal: Absence of hypoxia  Outcome: Progressing Towards Goal  Goal: Promote optimal lung function  Outcome: Progressing Towards Goal     Problem: Breathing Pattern - Ineffective  Goal: Ability to achieve and maintain a regular respiratory rate  Outcome: Progressing Towards Goal     Problem:  Body Temperature -  Risk of, Imbalanced  Goal: Ability to maintain a body temperature within defined limits  Outcome: Progressing Towards Goal  Goal: Will regain or maintain usual level of consciousness  Outcome: Progressing Towards Goal  Goal: Complications related to the disease process, condition or treatment will be avoided or minimized  Outcome: Progressing Towards Goal     Problem: Isolation Precautions - Risk of Spread of Infection  Goal: Prevent transmission of infectious organism to others  Outcome: Progressing Towards Goal     Problem: Nutrition Deficits  Goal: Optimize nutrtional status  Outcome: Progressing Towards Goal     Problem: Risk for Fluid Volume Deficit  Goal: Maintain normal heart rhythm  Outcome: Progressing Towards Goal  Goal: Maintain absence of muscle cramping  Outcome: Progressing Towards Goal  Goal: Maintain normal serum potassium, sodium, calcium, phosphorus, and pH  Outcome: Progressing Towards Goal     Problem: Loneliness or Risk for Loneliness  Goal: Demonstrate positive use of time alone when socialization is not possible  Outcome: Progressing Towards Goal     Problem: Fatigue  Goal: Verbalize increase energy and improved vitality  Outcome: Progressing Towards Goal     Problem: Patient Education: Go to Patient Education Activity  Goal: Patient/Family Education  Outcome: Progressing Towards Goal     Problem: Falls - Risk of  Goal: *Absence of Falls  Description: Document Slim Ricks Fall Risk and appropriate interventions in the flowsheet.   Outcome: Progressing Towards Goal  Note: Fall Risk Interventions:  Mobility Interventions: Bed/chair exit alarm, Communicate number of staff needed for ambulation/transfer, Patient to call before getting OOB         Medication Interventions: Bed/chair exit alarm, Evaluate medications/consider consulting pharmacy, Patient to call before getting OOB, Teach patient to arise slowly    Elimination Interventions: Bed/chair exit alarm, Call light in reach, Patient to call for help with toileting needs, Toileting schedule/hourly rounds              Problem: Patient Education: Go to Patient Education Activity  Goal: Patient/Family Education  Outcome: Progressing Towards Goal     Problem: Patient Education: Go to Patient Education Activity  Goal: Patient/Family Education  Outcome: Progressing Towards Goal

## 2021-11-11 NOTE — PROGRESS NOTES
TRANSFER - IN REPORT:    Verbal report received from 7600 Varinder Morillo RN on Adah Mean  being received from [employer] for change in patient condition(increasing oxygen requirement)      Report consisted of patients Situation, Background, Assessment and   Recommendations(SBAR). Information from the following report(s) SBAR, Kardex, ED Summary, Intake/Output, MAR, Recent Results, Cardiac Rhythm SR and Alarm Parameters  was reviewed with the receiving nurse. Opportunity for questions and clarification was provided. 1600: Assessment completed upon patients arrival to unit and care assumed by Anurag Garcia RN.

## 2021-11-11 NOTE — PROGRESS NOTES
Assumed care of pt from [facility]. 0935: Dr. Fitzgerald Number saw pt Pt on High flow and ABG's were ordered Respiratory coming to do ABG. 1124: Pt has orders to transfer pt. No bed available for pt yet per Supervisor.  notified there is no bed available yet. Will continue to monitor pt more frequently. 1225: Pt resting in prone position. O2 saturation is 95% on high flow. 1400: Family updated about pt moving to ICU.  1415: bed available for pt in ICU bed 10.  1440: Attempted to call report.

## 2021-11-12 LAB
ALBUMIN SERPL-MCNC: 2 G/DL (ref 3.4–5)
ALBUMIN/GLOB SERPL: 0.5 {RATIO} (ref 0.8–1.7)
ALP SERPL-CCNC: 97 U/L (ref 45–117)
ALT SERPL-CCNC: 40 U/L (ref 13–56)
ANION GAP SERPL CALC-SCNC: 4 MMOL/L (ref 3–18)
AST SERPL-CCNC: 23 U/L (ref 10–38)
BASOPHILS # BLD: 0 K/UL (ref 0–0.1)
BASOPHILS NFR BLD: 0 % (ref 0–2)
BILIRUB SERPL-MCNC: 0.5 MG/DL (ref 0.2–1)
BUN SERPL-MCNC: 18 MG/DL (ref 7–18)
BUN/CREAT SERPL: 27 (ref 12–20)
CALCIUM SERPL-MCNC: 8.5 MG/DL (ref 8.5–10.1)
CHLORIDE SERPL-SCNC: 102 MMOL/L (ref 100–111)
CO2 SERPL-SCNC: 32 MMOL/L (ref 21–32)
CREAT SERPL-MCNC: 0.66 MG/DL (ref 0.6–1.3)
CRP SERPL-MCNC: 2.2 MG/DL (ref 0–0.3)
D DIMER PPP FEU-MCNC: 1.2 UG/ML(FEU)
DIFFERENTIAL METHOD BLD: ABNORMAL
EOSINOPHIL # BLD: 0 K/UL (ref 0–0.4)
EOSINOPHIL NFR BLD: 0 % (ref 0–5)
ERYTHROCYTE [DISTWIDTH] IN BLOOD BY AUTOMATED COUNT: 13.3 % (ref 11.6–14.5)
FERRITIN SERPL-MCNC: 549 NG/ML (ref 8–388)
GLOBULIN SER CALC-MCNC: 3.8 G/DL (ref 2–4)
GLUCOSE BLD STRIP.AUTO-MCNC: 150 MG/DL (ref 70–110)
GLUCOSE BLD STRIP.AUTO-MCNC: 156 MG/DL (ref 70–110)
GLUCOSE BLD STRIP.AUTO-MCNC: 162 MG/DL (ref 70–110)
GLUCOSE BLD STRIP.AUTO-MCNC: 190 MG/DL (ref 70–110)
GLUCOSE SERPL-MCNC: 189 MG/DL (ref 74–99)
HCT VFR BLD AUTO: 34.1 % (ref 35–45)
HGB BLD-MCNC: 10.3 G/DL (ref 12–16)
IMM GRANULOCYTES # BLD AUTO: 0.2 K/UL (ref 0–0.04)
IMM GRANULOCYTES NFR BLD AUTO: 2 % (ref 0–0.5)
LDH SERPL L TO P-CCNC: 379 U/L (ref 81–234)
LYMPHOCYTES # BLD: 0.6 K/UL (ref 0.9–3.6)
LYMPHOCYTES NFR BLD: 7 % (ref 21–52)
MCH RBC QN AUTO: 26.2 PG (ref 24–34)
MCHC RBC AUTO-ENTMCNC: 30.2 G/DL (ref 31–37)
MCV RBC AUTO: 86.8 FL (ref 78–100)
MONOCYTES # BLD: 0.3 K/UL (ref 0.05–1.2)
MONOCYTES NFR BLD: 3 % (ref 3–10)
NEUTS SEG # BLD: 7.3 K/UL (ref 1.8–8)
NEUTS SEG NFR BLD: 87 % (ref 40–73)
NRBC # BLD: 0 K/UL (ref 0–0.01)
NRBC BLD-RTO: 0 PER 100 WBC
PLATELET # BLD AUTO: 466 K/UL (ref 135–420)
PMV BLD AUTO: 10.5 FL (ref 9.2–11.8)
POTASSIUM SERPL-SCNC: 4.8 MMOL/L (ref 3.5–5.5)
PROCALCITONIN SERPL-MCNC: <0.05 NG/ML
PROT SERPL-MCNC: 5.8 G/DL (ref 6.4–8.2)
RBC # BLD AUTO: 3.93 M/UL (ref 4.2–5.3)
SODIUM SERPL-SCNC: 138 MMOL/L (ref 136–145)
WBC # BLD AUTO: 8.4 K/UL (ref 4.6–13.2)

## 2021-11-12 PROCEDURE — 85379 FIBRIN DEGRADATION QUANT: CPT

## 2021-11-12 PROCEDURE — 36415 COLL VENOUS BLD VENIPUNCTURE: CPT

## 2021-11-12 PROCEDURE — 74011250636 HC RX REV CODE- 250/636: Performed by: HOSPITALIST

## 2021-11-12 PROCEDURE — 74011250636 HC RX REV CODE- 250/636: Performed by: INTERNAL MEDICINE

## 2021-11-12 PROCEDURE — 86140 C-REACTIVE PROTEIN: CPT

## 2021-11-12 PROCEDURE — 83615 LACTATE (LD) (LDH) ENZYME: CPT

## 2021-11-12 PROCEDURE — 85025 COMPLETE CBC W/AUTO DIFF WBC: CPT

## 2021-11-12 PROCEDURE — 74011250637 HC RX REV CODE- 250/637: Performed by: INTERNAL MEDICINE

## 2021-11-12 PROCEDURE — 74011636637 HC RX REV CODE- 636/637: Performed by: INTERNAL MEDICINE

## 2021-11-12 PROCEDURE — 65610000006 HC RM INTENSIVE CARE

## 2021-11-12 PROCEDURE — 84145 PROCALCITONIN (PCT): CPT

## 2021-11-12 PROCEDURE — 83520 IMMUNOASSAY QUANT NOS NONAB: CPT

## 2021-11-12 PROCEDURE — 82962 GLUCOSE BLOOD TEST: CPT

## 2021-11-12 PROCEDURE — 74011636637 HC RX REV CODE- 636/637: Performed by: HOSPITALIST

## 2021-11-12 PROCEDURE — 77010033711 HC HIGH FLOW OXYGEN

## 2021-11-12 PROCEDURE — 82728 ASSAY OF FERRITIN: CPT

## 2021-11-12 PROCEDURE — 80053 COMPREHEN METABOLIC PANEL: CPT

## 2021-11-12 RX ADMIN — INSULIN LISPRO 3 UNITS: 100 INJECTION, SOLUTION INTRAVENOUS; SUBCUTANEOUS at 12:19

## 2021-11-12 RX ADMIN — Medication 500 MG: at 08:29

## 2021-11-12 RX ADMIN — INSULIN LISPRO 2 UNITS: 100 INJECTION, SOLUTION INTRAVENOUS; SUBCUTANEOUS at 17:03

## 2021-11-12 RX ADMIN — Medication 10 ML: at 06:01

## 2021-11-12 RX ADMIN — INSULIN LISPRO 2 UNITS: 100 INJECTION, SOLUTION INTRAVENOUS; SUBCUTANEOUS at 22:00

## 2021-11-12 RX ADMIN — INSULIN LISPRO 2 UNITS: 100 INJECTION, SOLUTION INTRAVENOUS; SUBCUTANEOUS at 08:28

## 2021-11-12 RX ADMIN — Medication 10 ML: at 22:00

## 2021-11-12 RX ADMIN — Medication 10 ML: at 14:23

## 2021-11-12 RX ADMIN — INSULIN LISPRO 2 UNITS: 100 INJECTION, SOLUTION INTRAVENOUS; SUBCUTANEOUS at 12:19

## 2021-11-12 RX ADMIN — ENOXAPARIN SODIUM 30 MG: 30 INJECTION SUBCUTANEOUS at 20:40

## 2021-11-12 RX ADMIN — ZINC SULFATE 220 MG (50 MG) CAPSULE 1 CAPSULE: CAPSULE at 08:29

## 2021-11-12 RX ADMIN — PANTOPRAZOLE SODIUM 40 MG: 40 TABLET, DELAYED RELEASE ORAL at 08:29

## 2021-11-12 RX ADMIN — THIAMINE HCL TAB 100 MG 100 MG: 100 TAB at 08:29

## 2021-11-12 RX ADMIN — INSULIN LISPRO 3 UNITS: 100 INJECTION, SOLUTION INTRAVENOUS; SUBCUTANEOUS at 17:04

## 2021-11-12 RX ADMIN — ENOXAPARIN SODIUM 30 MG: 30 INJECTION SUBCUTANEOUS at 08:27

## 2021-11-12 RX ADMIN — INSULIN GLARGINE 10 UNITS: 100 INJECTION, SOLUTION SUBCUTANEOUS at 22:00

## 2021-11-12 RX ADMIN — Medication 10 MG: at 22:00

## 2021-11-12 RX ADMIN — Medication 2000 UNITS: at 08:29

## 2021-11-12 RX ADMIN — DEXAMETHASONE SODIUM PHOSPHATE 10 MG: 4 INJECTION, SOLUTION INTRAMUSCULAR; INTRAVENOUS at 21:00

## 2021-11-12 RX ADMIN — Medication 500 MG: at 21:00

## 2021-11-12 NOTE — PROGRESS NOTES
0110: Found pt out of bed attempting to get to the bathroom. Pt voided on the floor.  notified of situation and order for hawk placed. 0120:Called  to aid in communicating with pt in preferred language. Call consisted of educating patient of using call bell when needing assistance. As well as keeping HiFlow NC on at all times. Due to O2 desaturation when off. Pt verbalized understanding. Will need reinforcement. 0150: Hawk catheter inserted. Urine return on placement. Pt tolerated well.   0400: No changes in condition at this time. 0710:Bedside shift change report given to PaulRN (oncoming nurse) by Anil Caballero RN (offgoing nurse). Report included the following information SBAR, Kardx, ED Summary, I&O, MAR, and Recent Results.

## 2021-11-12 NOTE — PROGRESS NOTES
0715-received report from [address] included SBAR MAR and Kardex. Shift Summary-pt maintained on HFNC. Appetite good, eating meals. Small BM today. Shook draining well. Bedside and Verbal shift change report given to [id number] [facility] [place] (oncoming nurse) by France Green RN (offgoing nurse). Report included the following information SBAR, Kardex and MAR.

## 2021-11-12 NOTE — PROGRESS NOTES
Hospitalist Progress Note-critical care note     Patient: Rosie Ballard MRN: [id number]  CSN: [id number]    YOB: 1950  Age: 70 y.o. Sex: female    DOA: 11/3/2021 LOS:  LOS: 9 days            Chief complaint: covid 19 pnam, htn . DM     Assessment/Plan         Hospital Problems  Date Reviewed: 11/3/2021          Codes Class Noted POA    Acute respiratory failure due to COVID-19 [facility]) ICD-10-CM: U07.1, J96.00  ICD-9-CM: 518.81, 079.89  11/3/2021 Unknown        * (Principal) Pneumonia due to COVID-19 virus ICD-10-CM: U07.1, J12.82  ICD-9-CM: 480.8, 079.89  11/3/2021 Unknown        HTN (hypertension) ICD-10-CM: I10  ICD-9-CM: 401.9  11/3/2021 Unknown        Diabetes mellitus type 2, [facility] ICD-10-CM: E11.9  ICD-9-CM: 250.00  11/3/2021 Unknown             acute respiratory failure with hypoxia   need high flow 40 L , 90 % oxyg, abg hypoxia   Continue supportive treatment      Pneumonia due to Covid with hypoxia,  Continue iv deca drone, ID f/u -received Tociluzuma   Completed remdesivir  Continue supportive treatment   duplex of lower extremity neg for DVT  Ferritin trending down     Hyperglycemia/diabetes:   Continue lantus 10  SSI PRN add premeal insulin     Hypertension  Continue hydralazine as needed     Mild increase in liver enzymes,   Due to viral infection   lft wnl          Hypokalemia-resolved     Pt was seen through glass window  due to high risk of spreading of infection, she looks comfortable on high flow-much better than yesterday   Son was updated for pt's condition       She is not vaccinated     Disposition :tbd,   Review of systems:  Limited due to through the glass window   Vital signs/Intake and Output:  Visit Vitals  /62   Pulse 68   Temp 99.3 °F (37.4 °C)   Resp 19   Ht 5' 3\" (1.6 m)   Wt 79.1 kg (174 lb 6.1 oz)   SpO2 99%   BMI 30.89 kg/m²     Current Shift:  No intake/output data recorded.   Last three shifts:  11/10 1901 - 11/12 0700  In: 340 [P.O.:240; I.V.:100]  Out: 1100 [Urine:1100]    Physical Exam:  General: WD, WN. Alert, cooperative, in not  distress    HEENT: NC, Atraumatic. PERRLA, anicteric sclerae. High flow   Lungs: Deferred   Heart:              Deferred   Abdomen: Deferred   Extremities: No c/c/e  Psych:   Not anxious or agitated. Neurologic:  Alert, feeding her self           Labs: Results:       Chemistry Recent Labs     11/12/21  0419 11/11/21  0446 11/10/21  0555   * 355* 189*    134* 136   K 4.8 4.4 4.5    98* 101   CO2 32 32 31   BUN 18 18 16   CREA 0.66 0.86 0.68   CA 8.5 8.0* 8.2*   AGAP 4 4 4   BUCR 27* 21* 24*   AP 97 112 102   TP 5.8* 5.9* 5.8*   ALB 2.0* 2.0* 2.0*   GLOB 3.8 3.9 3.8   AGRAT 0.5* 0.5* 0.5*      CBC w/Diff Recent Labs     11/12/21  0419 11/11/21  0446 11/10/21  0555   WBC 8.4 11.6 12.9   RBC 3.93* 3.85* 3.80*   HGB 10.3* 10.4* 10.2*   HCT 34.1* 33.2* 33.3*   * 443* 439*   GRANS 87* 85* 89*   LYMPH 7* 6* 6*   EOS 0 0 0      Cardiac Enzymes Recent Labs     11/11/21 0446   CPK 67      Coagulation No results for input(s): PTP, INR, APTT, INREXT, INREXT in the last 72 hours. Lipid Panel No results found for: CHOL, CHOLPOCT, CHOLX, CHLST, CHOLV, 544743, HDL, HDLP, LDL, LDLC, DLDLP, 470438, VLDLC, VLDL, TGLX, TRIGL, TRIGP, TGLPOCT, CHHD, CHHDX   BNP No results for input(s): BNPP in the last 72 hours. Liver Enzymes Recent Labs     11/12/21 0419   TP 5.8*   ALB 2.0*   AP 97      Thyroid Studies No results found for: T4, T3U, TSH, TSHEXT, TSHEXT     Procedures/imaging: see electronic medical records for all procedures/Xrays and details which were not copied into this note but were reviewed prior to creation of Plan    CTA CHEST W OR W WO CONT    Result Date: 11/8/2021  EXAM: CTA chest INDICATION: Pain. COMPARISON: November 3, 2021. TECHNIQUE: Axial CT imaging from the thoracic inlet through the diaphragm with intravenous contrast. Coronal and sagittal MIP reformats were generated.  Dose reduction techniques used: automated exposure control, adjustment of the mAs and/or kVp according to patient size, and iterative reconstruction techniques. Digital imaging and communications in Medicine (DICOM) format image data are available to nonaffiliated external healthcare facilities or entities on a secure, media free, reciprocally searchable basis with patient authorization for at least 12 months after this study. _______________ FINDINGS: EXAM QUALITY: Adequate. PULMONARY ARTERIES: No evidence of pulmonary embolism. MEDIASTINUM: Normal heart size. No evidence of right heart strain. Aorta is unremarkable. No pericardial effusion. LUNGS: There are again seen numerous bilateral multilobar groundglass infiltrates. PLEURA: There are small bilateral pleural effusions. AIRWAY: Normal. LYMPH NODES: No enlarged nodes. UPPER ABDOMEN: Unremarkable. OTHER: No acute or aggressive osseous abnormalities identified. _______________     1. No evidence of pulmonary embolism. 2.  Numerous bilateral multilobar groundglass infiltrates again seen. 3. Small bilateral pleural effusions. CTA CHEST W OR W WO CONT    Result Date: 11/3/2021  EXAM: CTA chest CLINICAL INDICATION/HISTORY: COVID pneumonia, shortness of breath COMPARISON: Chest radiograph from the same day TECHNIQUE: Axial CT imaging from the thoracic inlet through the diaphragm with intravenous contrast. Coronal and sagittal MIP reformats were generated. One or more dose reduction techniques were used on this CT: automated exposure control, adjustment of the mAs and/or kVp according to patient size, and iterative reconstruction techniques. The specific techniques used on this CT exam have been documented in the patient's electronic medical record.  Digital Imaging and Communications in Medicine (DICOM) format image data are available to nonaffiliated external healthcare facilities or entities on a secured, media free, reciprocally searchable basis with patient authorization for at least a 12-month period after this study. _______________ FINDINGS: EXAM QUALITY: Quit PULMONARY ARTERIES: No pulmonary embolism identified. MEDIASTINUM: Normal heart size. Aorta is unremarkable. No pericardial effusion. LUNGS: Bilateral multilobar groundglass infiltrates secondary to COVID 19 pneumonia. AIRWAY: Normal. PLEURA: Normal. LYMPH NODES: No enlarged nodes. UPPER ABDOMEN: Unremarkable. BONES: No acute or aggressive osseous abnormalities identified. OTHER: None. _______________     1. No pulmonary embolism identified. 2. Bilateral multilobar groundglass infiltrates secondary to COVID 19 pneumonia. US ABD LTD    Result Date: 11/4/2021  EXAM: Limited right upper quadrant abdominal ultrasound INDICATION: Elevated liver function tests. COMPARISON: CT angiogram of the chest 11/3/2021. TECHNIQUE: Real-time abdomen/right upper quadrant sonography in multiple planes was performed with image documentation. Grayscale, color flow Doppler imaging, and velocity spectral waveform analysis of the portal vein was performed (duplex imaging). _______________ FINDINGS: LIVER: Mildly increased hepatic parenchymal echogenicity is present. No focal mass Color flow Doppler and velocity spectral waveform analysis of the portal vein shows normal (hepatopedal) direction of flow. [facility] [place] BILIARY SYSTEM: No intrahepatic biliary dilatation. Common bile duct is normal in caliber measuring 0.4 cm. GALLBLADDER: No gallstones or gallbladder wall thickening. No pericholecystic fluid. RIGHT KIDNEY: 8.6 cm in length. No hydronephrosis or renal mass. No visible calculi. PANCREAS: Head and body are unremarkable in appearance though the tail is obscured by overlying bowel gas. IVC: Visualized portions are unremarkable in appearance. OTHER: No free intraperitoneal fluid. _______________     Mildly increased hepatic parenchymal echotexture which can be seen in the context of steatosis and/or hepatocellular dysfunction.  No biliary ductal dilatation or mass lesion. XR CHEST PORT    Result Date: 11/7/2021  EXAM: XR CHEST PORT CLINICAL INDICATION/HISTORY: Worsening hypoxia   > Additional: None. COMPARISON: November 3, 2021 TECHNIQUE: Portable chest _______________ FINDINGS: SUPPORT DEVICES: None. HEART AND MEDIASTINUM: The heart is stable in size and contour. LUNGS AND PLEURAL SPACES: Extensive multifocal airspace opacities and consolidation are seen diffusely throughout the lungs little interval change. BONY THORAX AND SOFT TISSUES: No acute osseous abnormality. _______________     Extensive multifocal airspace opacities similar to prior study. XR CHEST PORT    Result Date: 11/3/2021  EXAM: CHEST RADIOGRAPH CLINICAL INDICATION/HISTORY: meets SIRS criteria   > Additional: Short of breath and cough COMPARISON: None TECHNIQUE: Portable frontal view of the chest _______________ FINDINGS: SUPPORT DEVICES: None. HEART AND MEDIASTINUM: Heart size is normal. LUNGS AND PLEURAL SPACES: Patchy bilateral consolidations. No pleural effusion or pneumothorax. BONES AND SOFT TISSUES: Unremarkable. _______________     Patchy bilateral consolidations could relate to COVID 19 pneumonia. ECHO ADULT COMPLETE    Result Date: 11/4/2021  · Left Ventricle: Normal cavity size, wall thickness and systolic function (ejection fraction normal). The estimated EF is 60 - 65%. There is mild (grade 1) left ventricular diastolic dysfunction. Wall Scoring: The left ventricular wall motion is normal. · Tricuspid Valve: Normal valve structure and no stenosis. Tricuspid regurgitation is inadequate for estimation of right ventricular systolic pressure. DUPLEX LOWER EXT VENOUS BILAT    Result Date: 11/5/2021  · No evidence of deep vein thrombosis in the right lower extremity. · No evidence of deep vein thrombosis in the left lower extremity.         Irene Gutierrez MD

## 2021-11-12 NOTE — PROGRESS NOTES
After communication with pulmo regarding pt's respiratory status vs function capability (mostly limited by resp condition), PT will hold treatment, until  11/15/21 or pulmo clears to resume.      Thank you,  Mercedes Hensley, PTA

## 2021-11-12 NOTE — PROGRESS NOTES
1930: Bedside shift change report given to Romi Choi RN (oncoming nurse) by Elfego Rodriguez (offgoing nurse). Report included the following information SBAR, Kardex, ED Summary, Intake/Output, MAR, Recent Results, Cardiac Rhythm SR and Alarm Parameters .

## 2021-11-12 NOTE — CONSULTS
Pulmonary Specialists  Pulmonary, Critical Care, and Sleep Medicine    Name: Adalberto Baker MRN: [id number]   : 1950 Hospital: [facility] [place]    Date: 2021  Room: [facility]/[address] [place] [address] Note                                              Consult requesting physician: Dr. Papi Henderson  Reason for Consult: Acute hypoxic respiratory failure    IMPRESSION:   ·   Patient Active Problem List   Diagnosis Code    Acute respiratory failure due to COVID-19 ([facility] [address] U07.1, J96.00    Pneumonia due to COVID-19 virus U07.1, J12.82    HTN (hypertension) I10    Diabetes mellitus type 2, controlled ([facility] [address] E11.9 ·   Anemia  · Code status: Full code   RECOMMENDATIONS:   Respiratory: Acute hypoxic respiratory failure due to diffuse severe COVID-19 pneumonia  Improvement in flow and FiO2 need today from yesterday  HFNC vs PAP vs early intubation: Avoid CPAP/BiPAP. Data suggest against early intubation with SPO2 goal > 85% acceptable. C/w HFNC. Patient made aware of using high O2 can lead to oxygen toxicity and its consequences including pulmonary fibrosis. FiO2: 70%, flow 40 L/min  Proning: early prone positioning recommended. Patient reports not wanting to prone citing \" I am not comfortable but will try tonight or tomorrow again\"  If intubated, avoid early high PEEP if possible. Inhaled NO and prostacyclin may be beneficial, but not available at THE [facility]. Keep SPO2 >= 85%. HOB 30 degree elevation all the time. Aggressive pulmonary toileting. Aspiration precautions. Incentive spirometry when not intubated. CVS: Hemodynamically stable  Cardiac enzymes and pro BNP normal  Anticoagulation: Lovenox 30 mg every 12. If increasing d-dimer and suspected pulmonary microthrombi or VTE, consider full dose anticoagulation. PVL BL LE 21  · No evidence of deep vein thrombosis in the right lower extremity.   · No evidence of deep vein thrombosis in the left lower extremity    ID: ID following  Severe COVID-19 pneumonia with hypoxic respiratory failure. COVID-19 vaccine status: not taken  COVID-19 test: 11/3/21  Not candidate for antibodies such as Bamlanivimab, Regeneron or Casirivimib plus Imdevimab   Remdesivir: finished 5 days from 11/3/21  Dexamethasone: 10 mg daily  Actemra: 11/11/21  Monitor for cytokine surge  Difficulty enrolling in research trial.  Vit C, Vit D3, Thiamine 100 mg daily, Zinc sulfate 1 cap daily, Melatonin 5 mg Qhs. Avoid NSAID. Use tylenol for fever and pain. Rapid influenza test: Negative. Blood cx 11/3/21: Staph Coag negative - likely contamination    Viral hepatitis panel: negative   HIV: negative   QuantiFERON-TB gold: negative  Daily CBC with diff, CMP, d-dimer, LDH, CRP. IL-6 (send out test and may not result timely) per clinical course    Hematology/Oncology: Monitor daily CBC, D-Dimer  Anticoagulation Lovenox 30 mg Q12   Renal: normal renal function  Hypovolemia due to fever and hyperventilation; and renal microthrombi which could occur in COVID19 infection; leading to renal failure and need for CRRT (which is not available at THE [facility]) or HD. Avoid hypervolemia. Gentle hydration as needed. Monitor volume status, UOP and renal functions closely. GI/: Monitor LFT daily. Endocrine: Monitor FSBS. SSI as needed  Neurology: AAOx 3, non-focal exam  Toxicology: none  Pain/Sedation: none  Skin/Wound: none  Electrolytes: Replace electrolytes per ICU electrolyte replacement protocol. Nutrition: diet  Prophylaxis: DVT Prophylaxis: SCD/Lovenox. GI Prophylaxis: Protonix.    Restraints: none  Lines/Tubes: PIV  Advance Directive/Palliative Care: full code, palliative care consulted per clinical course    Will defer respective systems problem management to primary and other respective consultant and follow patient in ICU with primary and other medical team.  Further recommendations will be based on the patient's response to recommended treatment and results of the investigation ordered. Quality Care: PPI, DVT prophylaxis, HOB elevated, Infection control all reviewed and addressed. Care of plan d/w RN, RT, hospitalist team, ID  D/w patient (answered all questions to satisfaction). High complexity decision making was performed during the evaluation of this patient at high risk for decompensation with multiple organ involvement. Total critical care time spent rendering care exclusive of procedures/family discussion/coordination of care: 35 minutes. NOTE:  Positive COVID19 infection (Coronavirus), unknown disease course and outcome, consequences including but not limited to respiratory failure requiring ventilator use, cardiac failure and multiorgan failure leading to death, limited investigational treatment based on limited data which may or may not help and can be harmful. [id number] patients on ventilator support are being reported to have a poor prognosis with severe hypoxemia and/or viral cardiomyopathy related cardiac arrest, and high mortality compared to illness. The clinical spectrum seems to fluctuate in severity between patients, variant and also the clinical spectrum changes in the same patient. Medications such as Tocilizumab, Bamlanivimab, Regeneron or Casirivimib plus Imdevimab, Baricitinib plus Ramdesivir, anti-viral medications, plasma and other are investigational, and mostly EUA by FDA recommended, some have fallen out of favor due to lack of clinical benefit. Difficulty to enroll in clinical trials as research centers may not be located locally or easily accessible. Not all medications are easily available and response may vary from variants, patient to patient. These meds are being used based on experience from SARS-CoV infection in past experiences from [facility] [facility], [place], [facility] and other world Covid 19 cases. Certain management strategies are based on anecdotal evidence seen in COVID-19 patients.  Since it is a recent pandemic, multiple variants and evolving, many trials, studies and case reports are being reported early, and no specific consensus guidelines from big societies are evident. Patient discussion:  Patient is educated about COVID19 infection, spread of infection, unknown disease course and outcome, multiple variants, consequences including but not limited to respiratory failure requiring ventilator use, cardiac failure and multiorgan failure leading to death, limited investigational treatment based on limited data which may or may not help and can be harmful. She verbalized understanding and agreed to try investigational therapy with unknown outcome. The management strategies including medications and ventilator support are based on recommendations from center such as [address] [place], [place] [address]vince [place], [facility], [place] [address], Candida Reid, other in COVID-19 patients, and medications and ventilator management strategies are evolving based on new data. Anticoagulation seems to be current standard of care for people with hypoxemic respiratory failure with COVID pneumonia. Dexamethasone (Recovery trial-[place]) or Solu-Medrol being recommended for treatment of COVID pneumonia needing oxygen supplementation. Proning is recommended for patients with hypoxemic respiratory failure. This provided treatment/management may cover potential off-label and compassionate use of medications and/or immunosuppressant for COVID19 positive patient in the context of a public health emergency related to [address] (a.k.a the Coronavirus) and in connection with the state of emergency declared by the [address] [place] [address]. Given emerging nature of this infection, further treatment could be underway simultaneously and may not be available to the provider. Some of the services which may be beneficial but are not available at THE [facility] facility (like inhaled nitric oxide, CRRT, ECMO). Some of the labs are not available and send out and may not be resulted on timely manner.  Recommended management may not guarantee outcome nor substitute for the practice of medicine for any medical society, clinical evidence based professional advice, work up or treatments and meant to adopt to each specific patient based on the current condition, presentation, consideration of the patient's needs, the resources available at the location from where the medical professional services are being provided and any other unique circumstances. The CDC federal and state guidelines have emphasized repeatedly the importance of vaccination to prevent severe infection, mortality, disabilities from [id number] with several SARS-CoV2 strains currently in the [place]. The treatment protocols are followed based on local hospital protocols, with guidance from centers such as [facility] protocols, [facility], other. The local hospital protocols have been guided by THE [facility] pharmacy department. THE [facility] is not part of any research protocol. Several trials with different medications are ongoing in the [place] and rest of the world, with changes being recommended. These changes are just recommendations, and may not be available at local facilities, and may not be approved by FDA for the [facility]. The ICU team are in constant communication with hospital pharmacy and administration, ID to provide best possible management for COVID 19 pneumonia patients in this hospital. This is discussed with the patient who verbalized understanding. NOTE  UPTODATE- REMDESIVIR  Dosing: Hepatic Impairment: Adult  Baseline hepatic impairment: There are no dosage adjustments provided (has not been studied); not recommended to be used in patients with baseline ALT ? 5 times the ULN (FDA 2020a). Hepatoxicity during therapy:  ALT ? 5 times the ULN: Discontinue remdesivir; may resume when ALT is <5 times the ULN (FDA 2020a).   ALT elevation AND signs or symptoms of liver inflammation or increasing conjugated bilirubin, alkaline phosphatase, or INR: Discontinue remdesivir (FDA [phone] 25). Upper limit of transaminases variable, and clinical context needed; first do no harm with medicationsespecially if the medications have not been established to be conclusively beneficial; consider risks of side effects versus potential benefits with medications such as remdesivir, tocilizumab in the setting of acute hepatitis. Remdesivir has been recently approved by FDA, but recent WHO trials failed to show mortality benefit. These meds are being used based on experience from [address] [place] [address] and global treatment protocols from various institutions. Positive COVID19 infection (Coronavirus), unknown disease course and outcome, multiple variants, consequences including but not limited to respiratory failure requiring ventilator use, cardiac failure and multiorgan failure leading to death, limited investigational treatment based on limited data which may or may not help, and may not change the prognosis. [id number] patients on ventilator support are being reported to have a poor prognosis with severe hypoxemia and/or viral cardiomyopathy related cardiac arrest, and high mortality compared to influenza illness. The clinical spectrum seems to fluctuate in severity between patients, variant to variant and also the clinical spectrum changes in the same patient. Tocilizumab, Bamlanivimab, Regeneron or Casirivimib plus Imdevimab, Baricitinib plus Ramdesivir, anti-viral medications, plasma and other are investigational, and mostly EUA by FDA recommended, some have fallen out of favor due to lack of clinical benefit. Difficulty to enroll in clinical trials as research centers may not be located locally or easily accessible. Subjective/History of Present Illness:   Patient is a 70 y.o. female with PMHx significant for HTN seen at the request of Dr. France Cook to assist during ICU care for acute hypoxic respiratory failure with Covid19 pneumonia.    help provided through hospital used during this evaluation. She is unvaccinated female admitted on 11/3/2021 with progressive shortness of breath, fever, body ache for at least 4 days prior to admission. In ER, noted to be hypoxic requiring supplemental oxygen and further testing suggested COVID-19 infection, multilobar pneumonia without PE on CTA chest.  Patient denies any family member with COVID-19 infection while historically multiple family members were diagnosed with COVID-19 infection per staff. She received remdesivir, steroid and anticoagulation prophylaxis. Her hospital course is significant for progressive increasing need of supplemental oxygen to HFNC when hospitalist transferred the patient to the ICU. Patient seen at bedside in room #353. She feels no worsening in her dyspnea compared to admission despite increased need for O2. The patient reports wet cough denies phlegm, chest pain, wheezing or hemoptysis, diarrhea, nausea, vomiting, change in the taste or smell. She has received today Actemra. Travel Hx: none  Exposure to suspected COVID19 patient: unknown  COVID19 vaccination: not taken    : Cory Deal ID# [id number]    I/O last 24 hrs: Intake/Output Summary (Last 24 hours) at 11/12/2021 1334  Last data filed at 11/12/2021 1200  Gross per 24 hour   Intake 100 ml   Output 850 ml   Net -750 ml       11/12/21  Patient seen at bedside in ICU room #110 under proper PPE  This a.m. HFNC weaned to 40 L/min and 70%  Patient is not proning citing not comfortable with proning  Stable wet cough without significant phlegm  Feels dyspnea stable and mild  Denies any wheezing, CP, hemoptysis, fever, chills  Hemodynamically stable  Good p.o. intake and voiding urine  [facility] was not contacted by staff on anything about patient overnight.        Review of Systems:  General ROS: negative for - fever, chills, weight loss  Cardiovascular ROS: negative for - chest pain, edema, murmur, orthopnea, palpitations or pnd  Gastrointestinal ROS: no nausea, vomiting, abdominal pain, change in bowel habits  Dermatological ROS: negative for - pruritus, rash or skin lesion changes       No Known Allergies     Past Medical History:   Diagnosis Date    Diabetes ([address]     HTN (hypertension)     Hyperlipidemia       History reviewed. No pertinent surgical history.      Social History     Tobacco Use    Smoking status: Never Smoker    Smokeless tobacco: Never Used   Substance Use Topics    Alcohol use: Yes      Family History   Problem Relation Age of Onset    Hypertension Mother       Prior to Admission medications    Not on File     Current Facility-Administered Medications   Medication Dose Route Frequency    thiamine mononitrate (B-1) tablet 100 mg  100 mg Oral DAILY    insulin lispro (HUMALOG) injection 3 Units  3 Units SubCUTAneous TIDAC    insulin glargine (LANTUS) injection 10 Units  10 Units SubCUTAneous QHS    pantoprazole (PROTONIX) tablet 40 mg  40 mg Oral ACB    dexamethasone (DECADRON) 4 mg/mL injection 10 mg  10 mg IntraVENous Q24H    enoxaparin (LOVENOX) injection 30 mg  30 mg SubCUTAneous Q12H    cholecalciferol (VITAMIN D3) (1000 Units /25 mcg) tablet 2,000 Units  2,000 Units Oral DAILY    insulin lispro (HUMALOG) injection   SubCUTAneous AC&HS    sodium chloride (NS) flush 5-40 mL  5-40 mL IntraVENous Q8H    zinc sulfate (ZINCATE) 50 mg zinc (220 mg) capsule 1 Capsule  1 Capsule Oral DAILY    ascorbic acid (vitamin C) (VITAMIN C) tablet 500 mg  500 mg Oral BID    melatonin (rapid dissolve) tablet 10 mg  10 mg Oral QHS         Objective:   Vital Signs:    Visit Vitals  BP (!) 94/50   Pulse 78   Temp 98.6 °F (37 °C)   Resp 24   Ht 5' 3\" (1.6 m)   Wt 79.1 kg (174 lb 6.1 oz)   SpO2 93%   BMI 30.89 kg/m²       O2 Device: Hi flow nasal cannula   O2 Flow Rate (L/min): 40 l/min   Temp (24hrs), Av.7 °F (37.1 °C), Min:98.3 °F (36.8 °C), Max:99.3 °F (37.4 °C)       Intake/Output:   Last shift:       07 -  1900  In: -   Out: 350 [Urine:350]    Last 3 shifts: 11/10 1901 - 11/12 0700  In: 340 [P.O.:240;  I.V.:100]  Out: 1100 [Urine:1100]      Intake/Output Summary (Last 24 hours) at 11/12/2021 1334  Last data filed at 11/12/2021 1200  Gross per 24 hour   Intake 100 ml   Output 850 ml   Net -750 ml       Last 3 Recorded Weights in this Encounter    11/08/21 0422 11/10/21 0723 11/11/21 1600   Weight: 80.6 kg (177 lb 11.1 oz) 78.7 kg (173 lb 8 oz) 79.1 kg (174 lb 6.1 oz)         Recent Labs     11/11/21  1032   PHI 7.46*   PCO2I 39.3   PO2I 77*   HCO3I 27.8*   FIO2I 90       Physical Exam: Limitations in exam due to covid 19 isolation and full PPE requirement  General: AAO x 3, in no respiratory distress, cooperative, appears stated age, on HFNC  HEENT: PERRLA, EOMI, sclera nonicteric  Neck: No abnormally enlarged lymph nodes or thyroid, supple  Chest: Obese chest wall  Lungs: moderate air entry, few rhonchi and no wheezes bilaterally, no tenderness/ rash  Heart: Regular rate and rhythm, S1S2 present or without murmur or extra heart sounds  Abdomen: Obese, bowel sounds normoactive, tympanic, abdomen is soft without significant tenderness, masses, organomegaly or guarding, rigidity, rebound  Extremity: no edema, cyanosis  Neuro: alert, oriented x 3, no defects noted in limited exam.  Skin: Skin color, texture, turgor unchanged        Data:       Recent Results (from the past 24 hour(s))   GLUCOSE, POC    Collection Time: 11/11/21  5:00 PM   Result Value Ref Range    Glucose (POC) 123 (H) 70 - 110 mg/dL   GLUCOSE, POC    Collection Time: 11/11/21  9:39 PM   Result Value Ref Range    Glucose (POC) 138 (H) 70 - 110 mg/dL   FERRITIN    Collection Time: 11/12/21  4:19 AM   Result Value Ref Range    Ferritin 549 (H) 8 - 388 NG/ML   C REACTIVE PROTEIN, QT    Collection Time: 11/12/21  4:19 AM   Result Value Ref Range    C-Reactive protein 2.2 (H) 0 - 0.3 mg/dL   PROCALCITONIN    Collection Time: 11/12/21  4:19 AM   Result Value Ref Range    Procalcitonin <0.05 ng/mL   D DIMER    Collection Time: 11/12/21  4:19 AM   Result Value Ref Range    D DIMER 1.20 (H) <0.46 ug/ml(FEU)   CBC WITH AUTOMATED DIFF    Collection Time: 11/12/21  4:19 AM   Result Value Ref Range    WBC 8.4 4.6 - 13.2 K/uL    RBC 3.93 (L) 4.20 - 5.30 M/uL    HGB 10.3 (L) 12.0 - 16.0 g/dL    HCT 34.1 (L) 35.0 - 45.0 %    MCV 86.8 78.0 - 100.0 FL    MCH 26.2 24.0 - 34.0 PG    MCHC 30.2 (L) 31.0 - 37.0 g/dL    RDW 13.3 11.6 - 14.5 %    PLATELET 575 (H) 770 - 420 K/uL    MPV 10.5 9.2 - 11.8 FL    NRBC 0.0 0  WBC    ABSOLUTE NRBC 0.00 0.00 - 0.01 K/uL    NEUTROPHILS 87 (H) 40 - 73 %    LYMPHOCYTES 7 (L) 21 - 52 %    MONOCYTES 3 3 - 10 %    EOSINOPHILS 0 0 - 5 %    BASOPHILS 0 0 - 2 %    IMMATURE GRANULOCYTES 2 (H) 0.0 - 0.5 %    ABS. NEUTROPHILS 7.3 1.8 - 8.0 K/UL    ABS. LYMPHOCYTES 0.6 (L) 0.9 - 3.6 K/UL    ABS. MONOCYTES 0.3 0.05 - 1.2 K/UL    ABS. EOSINOPHILS 0.0 0.0 - 0.4 K/UL    ABS. BASOPHILS 0.0 0.0 - 0.1 K/UL    ABS. IMM. GRANS. 0.2 (H) 0.00 - 0.04 K/UL    DF AUTOMATED     METABOLIC PANEL, COMPREHENSIVE    Collection Time: 11/12/21  4:19 AM   Result Value Ref Range    Sodium 138 136 - 145 mmol/L    Potassium 4.8 3.5 - 5.5 mmol/L    Chloride 102 100 - 111 mmol/L    CO2 32 21 - 32 mmol/L    Anion gap 4 3.0 - 18 mmol/L    Glucose 189 (H) 74 - 99 mg/dL    BUN 18 7.0 - 18 MG/DL    Creatinine 0.66 0.6 - 1.3 MG/DL    BUN/Creatinine ratio 27 (H) 12 - 20      GFR est AA >60 >60 ml/min/1.73m2    GFR est non-AA >60 >60 ml/min/1.73m2    Calcium 8.5 8.5 - 10.1 MG/DL    Bilirubin, total 0.5 0.2 - 1.0 MG/DL    ALT (SGPT) 40 13 - 56 U/L    AST (SGOT) 23 10 - 38 U/L    Alk.  phosphatase 97 45 - 117 U/L    Protein, total 5.8 (L) 6.4 - 8.2 g/dL    Albumin 2.0 (L) 3.4 - 5.0 g/dL    Globulin 3.8 2.0 - 4.0 g/dL    A-G Ratio 0.5 (L) 0.8 - 1.7     LD    Collection Time: 11/12/21  4:19 AM   Result Value Ref Range     (H) 81 - 234 U/L   GLUCOSE, POC    Collection Time: 11/12/21  7:27 AM   Result Value Ref Range Glucose (POC) 156 (H) 70 - 110 mg/dL   GLUCOSE, POC    Collection Time: 11/12/21 12:14 PM   Result Value Ref Range    Glucose (POC) 190 (H) 70 - 110 mg/dL         Chemistry Recent Labs     11/12/21  0419 11/11/21  0446 11/10/21  0555   * 355* 189*    134* 136   K 4.8 4.4 4.5    98* 101   CO2 32 32 31   BUN 18 18 16   CREA 0.66 0.86 0.68   CA 8.5 8.0* 8.2*   AGAP 4 4 4   BUCR 27* 21* 24*   AP 97 112 102   TP 5.8* 5.9* 5.8*   ALB 2.0* 2.0* 2.0*   GLOB 3.8 3.9 3.8   AGRAT 0.5* 0.5* 0.5*        Lactic Acid No results found for: LAC  No results for input(s): LAC in the last 72 hours. Liver Enzymes Protein, total   Date Value Ref Range Status   11/12/2021 5.8 (L) 6.4 - 8.2 g/dL Final     Albumin   Date Value Ref Range Status   11/12/2021 2.0 (L) 3.4 - 5.0 g/dL Final     Globulin   Date Value Ref Range Status   11/12/2021 3.8 2.0 - 4.0 g/dL Final     A-G Ratio   Date Value Ref Range Status   11/12/2021 0.5 (L) 0.8 - 1.7   Final     Alk. phosphatase   Date Value Ref Range Status   11/12/2021 97 45 - 117 U/L Final     Recent Labs     11/12/21  0419 11/11/21  0446 11/10/21  0555   TP 5.8* 5.9* 5.8*   ALB 2.0* 2.0* 2.0*   GLOB 3.8 3.9 3.8   AGRAT 0.5* 0.5* 0.5*   AP 97 112 102        CBC w/Diff Recent Labs     11/12/21  0419 11/11/21  0446 11/10/21  0555   WBC 8.4 11.6 12.9   RBC 3.93* 3.85* 3.80*   HGB 10.3* 10.4* 10.2*   HCT 34.1* 33.2* 33.3*   * 443* 439*   GRANS 87* 85* 89*   LYMPH 7* 6* 6*   EOS 0 0 0        Cardiac Enzymes No results found for: CPK, CK, CKMMB, CKMB, RCK3, CKMBT, CKNDX, CKND1, ROSINA, TROPT, TROIQ, MEDHAT, TROPT, TNIPOC, BNP, BNPP     BNP No results found for: BNP, BNPP, XBNPT     Coagulation No results for input(s): PTP, INR, APTT, INREXT, INREXT in the last 72 hours.       Thyroid  No results found for: T4, T3U, TSH, TSHEXT, TSHEXT    No results found for: T4     Urinalysis No results found for: COLOR, APPRN, SPGRU, REFSG, [place], PROTU, GLUCU, KETU, BILU, UROU, STAR, LEUKU, GLUKE, EPSU, BACTU, WBCU, RBCU, CASTS, UCRY     Micro  No results for input(s): SDES, CULT in the last 72 hours. No results for input(s): CULT in the last 72 hours. Culture data during this hospitalization. All Micro Results     Procedure Component Value Units Date/Time    CULTURE, BLOOD [[id number]]  (Abnormal) Collected: 11/03/21 1746    Order Status: Completed Specimen: Blood Updated: 11/05/21 1227     Special Requests: NO SPECIAL REQUESTS        GRAM STAIN       AEROBIC BOTTLE GRAM POSITIVE COCCI IN CLUSTERS                  SMEAR CALLED TO AND CORRECTLY REPEATED BY: Aleena Kaur RN 3N BY CAM ON 11/4/21 AT 1233 [address] GRAM POSITIVE COCCI IN CLUSTERS ANAEROBIC BOTTLE                  SMEAR CALLED TO AND CORRECTLY REPEATED BY: Dustin Krueger RN 3N AT 1500 ON 11/04/2021 TO [facility]. Culture result:       STAPHYLOCOCCUS SPECIES, COAGULASE NEGATIVE MULTIPLE COLONY TYPES/STRAINS GROWING IN THE AEROBIC AND ANAEROBIC BOTTLES NO SITE INDICATED          LEGIONELLA PNEUMOPHILA AG, URINE [[id number]] Collected: 11/03/21 2045    Order Status: Canceled Specimen: Urine     STREP Gunnar Huan, URINE [[id number]] Collected: 11/03/21 2045    Order Status: Canceled Specimen: Urine     CULTURE, RESPIRATORY/SPUTUM/BRONCH Sydell Milagro STAIN [[id number]] Collected: 11/03/21 2045    Order Status: Canceled Specimen: Sputum     INFLUENZA A & B AG (RAPID TEST) [[id number]] Collected: 11/03/21 1813    Order Status: Completed Specimen: Nasopharyngeal from Nasal washing Updated: 11/03/21 1843     Influenza A Antigen Negative        Comment: A negative result does not exclude influenza virus infection, seasonal or H1N1 due to suboptimal sensitivity. If influenza is circulating in your community, a diagnosis of influenza should be considered based on a patients clinical presentation and empiric antiviral treatment should be considered, if indicated.         Influenza B Antigen Negative       COVID-19 RAPID TEST [[id number]]  (Abnormal) Collected: 11/03/21 1813    Order Status: Completed Specimen: Nasopharyngeal Updated: 11/03/21 1840     Specimen source Nasopharyngeal        COVID-19 rapid test Detected        Comment:      The specimen is POSITIVE for SARS-CoV-2, the novel coronavirus associated with COVID-19. This test has been authorized by the FDA under an Emergency Use Authorization (EUA) for use by authorized laboratories. Fact sheet for Healthcare Providers: iTendency.uy  Fact sheet for Patients: iTendency.uy       Methodology: Isothermal Nucleic Acid Amplification  CALLED TO AND CORRECTLY REPEATED BY:  Gertrude TAY IN ER  [place] ON 11/03/2021 TO [facility]. Images report reviewed by me:  CT (Most Recent) (CT chest reviewed by me) Results from Hospital Encounter encounter on 11/03/21    CTA CHEST W OR W WO CONT    Narrative  EXAM: CTA chest    INDICATION: Pain. COMPARISON: November 3, 2021. TECHNIQUE: Axial CT imaging from the thoracic inlet through the diaphragm with  intravenous contrast. Coronal and sagittal MIP reformats were generated. Dose  reduction techniques used: automated exposure control, adjustment of the mAs  and/or kVp according to patient size, and iterative reconstruction techniques. Digital imaging and communications in Medicine (DICOM) format image data are  available to nonaffiliated external healthcare facilities or entities on a  secure, media free, reciprocally searchable basis with patient authorization for  at least 12 months after this study. _______________    FINDINGS:    EXAM QUALITY: Adequate. PULMONARY ARTERIES: No evidence of pulmonary embolism. MEDIASTINUM: Normal heart size. No evidence of right heart strain. Aorta is  unremarkable. No pericardial effusion. LUNGS: There are again seen numerous bilateral multilobar groundglass  infiltrates. PLEURA: There are small bilateral pleural effusions.     AIRWAY: Normal.    LYMPH NODES: No enlarged nodes. UPPER ABDOMEN: Unremarkable. OTHER: No acute or aggressive osseous abnormalities identified. _______________    Impression  1. No evidence of pulmonary embolism. 2.  Numerous bilateral multilobar groundglass infiltrates again seen. 3. Small bilateral pleural effusions. CXR reviewed by me:  XR (Most Recent). CXR  reviewed by me and compared with previous CXR Results from Hospital Encounter encounter on 11/03/21    XR CHEST PORT    Narrative  CHEST AP PORTABLE    Indication: Worsening hypoxia, Covid pneumonia. Comparison: X-ray 11/07/2021. Findings: Shallow inspiration. There is persistent bilateral diffuse reticular  and patchy alveolar opacities with lower lung zone predominance, stable to  slightly increased from comparison exam. For example left lung and disease in  right upper lung zone appears slightly more conspicuous. The cardiac silhouette  and pulmonary vascularity appear within normal limits. No evidence for  pneumothorax or pleural effusion. Impression  Bilateral diffuse disease, stable to slightly increased from comparison exam.           ·Please note: Voice-recognition software may have been used to generate this report, which may have resulted in some phonetic-based errors in grammar and contents. Even though attempts were made to correct all the mistakes, some may have been missed, and remained in the body of the document.       Volodymyr Robins MD  11/12/2021

## 2021-11-12 NOTE — PROGRESS NOTES
D/C Plan: TBD, CM anticipates patient will remain in hospital/ICU through the weekend. CM notes patient remains in ICU on HFNC at 40L /70%, improved from 50L /90% overnight. Patient not stable enough to transition from an acute care setting, Cm to continue to monitor and remain available for any discharge planning needs.

## 2021-11-12 NOTE — PROGRESS NOTES
[facility]  (A Division of [address] [place] [address])                                                                                                                    Harish Sebastian MD  Office #: - Option # [phone]  Fax #: [phone]     Date of Admission: 11/3/2021Date of Note: 11/12/2021  Reason for FU: Evaluation and antibiotic management of CPVID 19 infection  . Current Antimicrobials:    Prior Antimicrobials:  Dexamethasone 11/3 to date      Remdesivir 11/3 to date#5       Assessment- ID related:  --------------------------------------------------------------------------  · Acute hypoxic respiratory failure PaO2 50 on ABG:  Worse on HNC  CTA chest 11/3 and 11/7- no PE  SP Tociluzumab 11/11  · Viral PNA- Bilateral- Patchy infiltrate by CXR and B/L multilobular GGO on CT  · COVId 19 infection  S/P remdesvir  · Rhabdo 2/2 viral infection with transaminitis: Improving  · CoNS bacteremia- likely contaminant    COVId rapid + 11/3  Influenza A antigen : negative  Procal X3 NL- last 11/12  DD elevated to 7.84-> 2.28  CRP 12.7-> 14.5-> -> 2.2  Ferretin 35293-> 2953->-> 549  -> 379  IL 6 elevated 11/3  HIV 1/2- negative  Hepatitis C ab/ Hep B -negative. TB serology: Negative    Improving bio-markers while worsening hypoxia overnight, need further evaluation. Other Medical Issues- Mx per respective team:  · HTN  · Hyperlipidemia  · DM     Recommendation for ID issues I am following:  --------------------------------------------------------------------------  -cont dexamethasone   - progressed to NC overnight  DW via  regarding Tociluzumab/barcitinab and side effects. She agrees for treatment. Order placed to pharmacy for Tociluzuma    -> Biomarkers FU-declining- cRP/ferretin/DD  -> Vit complex/resp support with O2 per primary    DW with Dr Monico Nageotte, RN             Subjective:  Afebrile, WBC at 8.4K. Biomarkers decline curve.   Transferred to ICU 11/11 for worsening of resp failure-more hypoxia  On HFNC- 50L yesterday, down to 30L today- with some improvement  Feels ok, sitting up in bed, eating without assistance or difficulty  -Denies N/V/abd pain. Labs/notes reviewed        HPI:  Rebecca Flores is a 70 y.o. BLACK/ who is unvaccinated with PMH as listed below with limited communication due to [address] barrier. Admitted on 11/3 with hypoxia/symptoms of SOB and fever and body aches X4 days. Was hypoxic to 86% on RA-- apparently with muliplte family members sick with COVID 19 too. Found with fever, B/L lung infiltrate on CXR and CTA chest without PE, elevared D-dimer, high ferretin and CRP. Oxygen demand has been 4litres and is better down to 2 L at exam today. Currently on Remdesvir/dexamethasone. [facility] [address] from admission 1 set + for [facility]. Active Hospital Problems    Diagnosis Date Noted    Acute respiratory failure due to COVID-19 [facility]) 11/03/2021    Pneumonia due to COVID-19 virus 11/03/2021    HTN (hypertension) 11/03/2021    Diabetes mellitus type 2, controlled ([facility] [address] 11/03/2021     Past Medical History:   Diagnosis Date    Diabetes ([facility] [address]     HTN (hypertension)     Hyperlipidemia      History reviewed. No pertinent surgical history. Family History   Problem Relation Age of Onset    Hypertension Mother      Social History     Socioeconomic History    Marital status:      Spouse name: Not on file    Number of children: Not on file    Years of education: Not on file    Highest education level: Not on file   Occupational History    Not on file   Tobacco Use    Smoking status: Never Smoker    Smokeless tobacco: Never Used   Substance and Sexual Activity    Alcohol use:  Yes    Drug use: Never    Sexual activity: Not Currently   Other Topics Concern    Not on file   Social History Narrative    Not on file     Social Determinants of Health     Financial Resource Strain:     Difficulty of Paying Living Expenses: Not on file Food Insecurity:     Worried About Running Out of Food in the Last Year: Not on file    Aston of Food in the Last Year: Not on file   Transportation Needs:     Lack of Transportation (Medical): Not on file    Lack of Transportation (Non-Medical): Not on file   Physical Activity:     Days of Exercise per Week: Not on file    Minutes of Exercise per Session: Not on file   Stress:     Feeling of Stress : Not on file   Social Connections:     Frequency of Communication with Friends and Family: Not on file    Frequency of Social Gatherings with Friends and Family: Not on file    Attends Muslim Services: Not on file    Active Member of [address] [place] [address] [employer] or Organizations: Not on file    Attends Club or Organization Meetings: Not on file    Marital Status: Not on file   Intimate Partner Violence:     Fear of Current or Ex-Partner: Not on file    Emotionally Abused: Not on file    Physically Abused: Not on file    Sexually Abused: Not on file   Housing Stability:     Unable to Pay for Housing in the Last Year: Not on file    Number of [place] in the Last Year: Not on file    Unstable Housing in the Last Year: Not on file       Allergies:  Patient has no known allergies.      Medications:  Current Facility-Administered Medications   Medication Dose Route Frequency    thiamine mononitrate (B-1) tablet 100 mg  100 mg Oral DAILY    insulin lispro (HUMALOG) injection 3 Units  3 Units SubCUTAneous TIDAC    insulin glargine (LANTUS) injection 10 Units  10 Units SubCUTAneous QHS    pantoprazole (PROTONIX) tablet 40 mg  40 mg Oral ACB    dexamethasone (DECADRON) 4 mg/mL injection 10 mg  10 mg IntraVENous Q24H    loperamide (IMODIUM) capsule 4 mg  4 mg Oral Q4H PRN    sodium chloride (NS) flush 5-10 mL  5-10 mL IntraVENous PRN    enoxaparin (LOVENOX) injection 30 mg  30 mg SubCUTAneous Q12H    acetaminophen (TYLENOL) tablet 650 mg  650 mg Oral Q6H PRN    Or    acetaminophen (TYLENOL) suppository 650 mg 650 mg Rectal Q6H PRN    guaiFENesin-dextromethorphan (ROBITUSSIN DM) 100-10 mg/5 mL syrup 5 mL  5 mL Oral Q4H PRN    cholecalciferol (VITAMIN D3) (1000 Units /25 mcg) tablet 2,000 Units  2,000 Units Oral DAILY    insulin lispro (HUMALOG) injection   SubCUTAneous AC&HS    glucose chewable tablet 16 g  4 Tablet Oral PRN    glucagon (GLUCAGEN) injection 1 mg  1 mg IntraMUSCular PRN    dextrose (D50W) injection syrg 12.5-25 g  25-50 mL IntraVENous PRN    sodium chloride (NS) flush 5-40 mL  5-40 mL IntraVENous Q8H    sodium chloride (NS) flush 5-40 mL  5-40 mL IntraVENous PRN    polyethylene glycol (MIRALAX) packet 17 g  17 g Oral DAILY PRN    ondansetron (ZOFRAN ODT) tablet 4 mg  4 mg Oral Q8H PRN    Or    ondansetron (ZOFRAN) injection 4 mg  4 mg IntraVENous Q6H PRN    zinc sulfate (ZINCATE) 50 mg zinc (220 mg) capsule 1 Capsule  1 Capsule Oral DAILY    ascorbic acid (vitamin C) (VITAMIN C) tablet 500 mg  500 mg Oral BID    melatonin (rapid dissolve) tablet 10 mg  10 mg Oral QHS    hydrALAZINE (APRESOLINE) 20 mg/mL injection 10 mg  10 mg IntraVENous Q6H PRN          Physical Exam:    Temp (24hrs), Av.7 °F (37.1 °C), Min:98.3 °F (36.8 °C), Max:99.3 °F (37.4 °C)    Visit Vitals  BP (!) 94/50   Pulse 78   Temp 98.6 °F (37 °C)   Resp 24   Ht 5' 3\" (1.6 m)   Wt 79.1 kg (174 lb 6.1 oz)   SpO2 93%   BMI 30.89 kg/m²          GEN: WD Pbese, on her side. On 8L of oxygen via NC  HEENT: Unicteric. EOMI intact  CHEST: Non laboured breathing. No wheezes, no rales  ABD: Obese/soft. Non tender. PEPE: Deferred  EXT: No apparent swelling or redness on UE/LE joints. Skin: Dry and intact. No rash, no redness. CNS: A, OX3. Moves all extremity. CN grossly ok.       Microbiology  All Micro Results     Procedure Component Value Units Date/Time    CULTURE, BLOOD [[id number]]  (Abnormal) Collected: 21 9920    Order Status: Completed Specimen: Blood Updated: 21 1227     Special Requests: NO SPECIAL REQUESTS GRAM STAIN       AEROBIC BOTTLE GRAM POSITIVE COCCI IN CLUSTERS                  SMEAR CALLED TO AND CORRECTLY REPEATED BY: Armaan Moore RN 3N BY CAM ON 11/4/21 AT 1233 [address]. GRAM POSITIVE COCCI IN CLUSTERS ANAEROBIC BOTTLE                  SMEAR CALLED TO AND CORRECTLY REPEATED BY: Rayo Topete RN 3N AT 1500 ON 11/04/2021 TO [facility]. Culture result:       STAPHYLOCOCCUS SPECIES, COAGULASE NEGATIVE MULTIPLE COLONY TYPES/STRAINS GROWING IN THE AEROBIC AND ANAEROBIC BOTTLES NO SITE INDICATED          LEGIONELLA PNEUMOPHILA AG, URINE [[id number]] Collected: 11/03/21 2045    Order Status: Canceled Specimen: Urine     STREP [place], URINE [[id number]] Collected: 11/03/21 2045    Order Status: Canceled Specimen: Urine     CULTURE, RESPIRATORY/SPUTUM/BRONCH Wero Monk STAIN [[id number]] Collected: 11/03/21 2045    Order Status: Canceled Specimen: Sputum     INFLUENZA A & B AG (RAPID TEST) [[id number]] Collected: 11/03/21 1813    Order Status: Completed Specimen: Nasopharyngeal from Nasal washing Updated: 11/03/21 1843     Influenza A Antigen Negative        Comment: A negative result does not exclude influenza virus infection, seasonal or H1N1 due to suboptimal sensitivity. If influenza is circulating in your community, a diagnosis of influenza should be considered based on a patients clinical presentation and empiric antiviral treatment should be considered, if indicated. Influenza B Antigen Negative       COVID-19 RAPID TEST [[id number]]  (Abnormal) Collected: 11/03/21 1813    Order Status: Completed Specimen: Nasopharyngeal Updated: 11/03/21 1840     Specimen source Nasopharyngeal        COVID-19 rapid test Detected        Comment:      The specimen is POSITIVE for SARS-CoV-2, the novel coronavirus associated with COVID-19. This test has been authorized by the FDA under an Emergency Use Authorization (EUA) for use by authorized laboratories.         Fact sheet for Healthcare Providers: ConventionUpdate.co.nz  Fact sheet for Patients: ConventionUpdate.co.nz       Methodology: Isothermal Nucleic Acid Amplification  CALLED TO AND CORRECTLY REPEATED BY:  Williams TAY IN ER  [place] ON 11/03/2021 TO [place].                   Lines / Catheters:  Lab results:    Chemistry  Recent Labs     11/12/21  0419 11/11/21  0446 11/10/21  0555   * 355* 189*    134* 136   K 4.8 4.4 4.5    98* 101   CO2 32 32 31   BUN 18 18 16   CREA 0.66 0.86 0.68   CA 8.5 8.0* 8.2*   AGAP 4 4 4   BUCR 27* 21* 24*   AP 97 112 102   TP 5.8* 5.9* 5.8*   ALB 2.0* 2.0* 2.0*   GLOB 3.8 3.9 3.8   AGRAT 0.5* 0.5* 0.5*       CBC w/ Diff  Recent Labs     11/12/21  0419 11/11/21  0446 11/10/21  0555   WBC 8.4 11.6 12.9   RBC 3.93* 3.85* 3.80*   HGB 10.3* 10.4* 10.2*   HCT 34.1* 33.2* 33.3*   * 443* 439*   GRANS 87* 85* 89*   LYMPH 7* 6* 6*   EOS 0 0 0       Imaging: report posted below as per radiologist - reports reviewed in Epic

## 2021-11-13 LAB
ALBUMIN SERPL-MCNC: 2 G/DL (ref 3.4–5)
ALBUMIN/GLOB SERPL: 0.5 {RATIO} (ref 0.8–1.7)
ALP SERPL-CCNC: 95 U/L (ref 45–117)
ALT SERPL-CCNC: 37 U/L (ref 13–56)
ANION GAP SERPL CALC-SCNC: 3 MMOL/L (ref 3–18)
AST SERPL-CCNC: 23 U/L (ref 10–38)
BASOPHILS # BLD: 0 K/UL (ref 0–0.1)
BASOPHILS NFR BLD: 0 % (ref 0–2)
BILIRUB SERPL-MCNC: 0.5 MG/DL (ref 0.2–1)
BUN SERPL-MCNC: 19 MG/DL (ref 7–18)
BUN/CREAT SERPL: 29 (ref 12–20)
CALCIUM SERPL-MCNC: 8.3 MG/DL (ref 8.5–10.1)
CHLORIDE SERPL-SCNC: 104 MMOL/L (ref 100–111)
CO2 SERPL-SCNC: 31 MMOL/L (ref 21–32)
CREAT SERPL-MCNC: 0.65 MG/DL (ref 0.6–1.3)
CRP SERPL-MCNC: 1.1 MG/DL (ref 0–0.3)
D DIMER PPP FEU-MCNC: 1.03 UG/ML(FEU)
DIFFERENTIAL METHOD BLD: ABNORMAL
EOSINOPHIL # BLD: 0 K/UL (ref 0–0.4)
EOSINOPHIL NFR BLD: 0 % (ref 0–5)
ERYTHROCYTE [DISTWIDTH] IN BLOOD BY AUTOMATED COUNT: 13.6 % (ref 11.6–14.5)
FERRITIN SERPL-MCNC: 486 NG/ML (ref 8–388)
GLOBULIN SER CALC-MCNC: 3.8 G/DL (ref 2–4)
GLUCOSE BLD STRIP.AUTO-MCNC: 102 MG/DL (ref 70–110)
GLUCOSE BLD STRIP.AUTO-MCNC: 168 MG/DL (ref 70–110)
GLUCOSE BLD STRIP.AUTO-MCNC: 176 MG/DL (ref 70–110)
GLUCOSE SERPL-MCNC: 171 MG/DL (ref 74–99)
HCT VFR BLD AUTO: 34.4 % (ref 35–45)
HGB BLD-MCNC: 10.3 G/DL (ref 12–16)
IMM GRANULOCYTES # BLD AUTO: 0.1 K/UL (ref 0–0.04)
IMM GRANULOCYTES NFR BLD AUTO: 2 % (ref 0–0.5)
LDH SERPL L TO P-CCNC: 324 U/L (ref 81–234)
LYMPHOCYTES # BLD: 0.6 K/UL (ref 0.9–3.6)
LYMPHOCYTES NFR BLD: 8 % (ref 21–52)
MCH RBC QN AUTO: 26.5 PG (ref 24–34)
MCHC RBC AUTO-ENTMCNC: 29.9 G/DL (ref 31–37)
MCV RBC AUTO: 88.7 FL (ref 78–100)
MONOCYTES # BLD: 0.3 K/UL (ref 0.05–1.2)
MONOCYTES NFR BLD: 4 % (ref 3–10)
NEUTS SEG # BLD: 6.3 K/UL (ref 1.8–8)
NEUTS SEG NFR BLD: 87 % (ref 40–73)
NRBC # BLD: 0.02 K/UL (ref 0–0.01)
NRBC BLD-RTO: 0.3 PER 100 WBC
PLATELET # BLD AUTO: 411 K/UL (ref 135–420)
PMV BLD AUTO: 10.6 FL (ref 9.2–11.8)
POTASSIUM SERPL-SCNC: 4.9 MMOL/L (ref 3.5–5.5)
PROT SERPL-MCNC: 5.8 G/DL (ref 6.4–8.2)
RBC # BLD AUTO: 3.88 M/UL (ref 4.2–5.3)
SODIUM SERPL-SCNC: 138 MMOL/L (ref 136–145)
WBC # BLD AUTO: 7.2 K/UL (ref 4.6–13.2)

## 2021-11-13 PROCEDURE — 85379 FIBRIN DEGRADATION QUANT: CPT

## 2021-11-13 PROCEDURE — 74011250636 HC RX REV CODE- 250/636: Performed by: HOSPITALIST

## 2021-11-13 PROCEDURE — 82728 ASSAY OF FERRITIN: CPT

## 2021-11-13 PROCEDURE — 36415 COLL VENOUS BLD VENIPUNCTURE: CPT

## 2021-11-13 PROCEDURE — 83615 LACTATE (LD) (LDH) ENZYME: CPT

## 2021-11-13 PROCEDURE — 74011250636 HC RX REV CODE- 250/636: Performed by: INTERNAL MEDICINE

## 2021-11-13 PROCEDURE — 74011250637 HC RX REV CODE- 250/637: Performed by: INTERNAL MEDICINE

## 2021-11-13 PROCEDURE — 86140 C-REACTIVE PROTEIN: CPT

## 2021-11-13 PROCEDURE — 74011636637 HC RX REV CODE- 636/637: Performed by: INTERNAL MEDICINE

## 2021-11-13 PROCEDURE — 65610000006 HC RM INTENSIVE CARE

## 2021-11-13 PROCEDURE — 82962 GLUCOSE BLOOD TEST: CPT

## 2021-11-13 PROCEDURE — 85025 COMPLETE CBC W/AUTO DIFF WBC: CPT

## 2021-11-13 PROCEDURE — 80053 COMPREHEN METABOLIC PANEL: CPT

## 2021-11-13 PROCEDURE — 74011636637 HC RX REV CODE- 636/637: Performed by: HOSPITALIST

## 2021-11-13 PROCEDURE — 77010033711 HC HIGH FLOW OXYGEN

## 2021-11-13 PROCEDURE — 74011636637 HC RX REV CODE- 636/637

## 2021-11-13 RX ADMIN — INSULIN LISPRO 2 UNITS: 100 INJECTION, SOLUTION INTRAVENOUS; SUBCUTANEOUS at 07:30

## 2021-11-13 RX ADMIN — Medication 500 MG: at 08:59

## 2021-11-13 RX ADMIN — Medication 10 MG: at 21:35

## 2021-11-13 RX ADMIN — INSULIN LISPRO 2 UNITS: 100 INJECTION, SOLUTION INTRAVENOUS; SUBCUTANEOUS at 21:34

## 2021-11-13 RX ADMIN — ENOXAPARIN SODIUM 30 MG: 30 INJECTION SUBCUTANEOUS at 08:58

## 2021-11-13 RX ADMIN — INSULIN LISPRO 3 UNITS: 100 INJECTION, SOLUTION INTRAVENOUS; SUBCUTANEOUS at 07:30

## 2021-11-13 RX ADMIN — Medication 10 ML: at 06:00

## 2021-11-13 RX ADMIN — ZINC SULFATE 220 MG (50 MG) CAPSULE 1 CAPSULE: CAPSULE at 08:58

## 2021-11-13 RX ADMIN — INSULIN LISPRO 3 UNITS: 100 INJECTION, SOLUTION INTRAVENOUS; SUBCUTANEOUS at 13:53

## 2021-11-13 RX ADMIN — INSULIN GLARGINE 10 UNITS: 100 INJECTION, SOLUTION SUBCUTANEOUS at 21:34

## 2021-11-13 RX ADMIN — DEXAMETHASONE SODIUM PHOSPHATE 10 MG: 4 INJECTION, SOLUTION INTRAMUSCULAR; INTRAVENOUS at 21:34

## 2021-11-13 RX ADMIN — PANTOPRAZOLE SODIUM 40 MG: 40 TABLET, DELAYED RELEASE ORAL at 07:30

## 2021-11-13 RX ADMIN — INSULIN LISPRO 2 UNITS: 100 INJECTION, SOLUTION INTRAVENOUS; SUBCUTANEOUS at 13:52

## 2021-11-13 RX ADMIN — THIAMINE HCL TAB 100 MG 100 MG: 100 TAB at 08:59

## 2021-11-13 RX ADMIN — ENOXAPARIN SODIUM 30 MG: 30 INJECTION SUBCUTANEOUS at 21:34

## 2021-11-13 RX ADMIN — Medication 10 ML: at 21:35

## 2021-11-13 RX ADMIN — Medication 500 MG: at 21:35

## 2021-11-13 RX ADMIN — Medication 2000 UNITS: at 08:59

## 2021-11-13 RX ADMIN — Medication 10 ML: at 13:53

## 2021-11-13 NOTE — PROGRESS NOTES
Called at the listed # for Augusto Nissen -family member  No response. LVM.   Will await callback  I will provide future update to the listed family member as time permits    Ida Pendleton MD

## 2021-11-13 NOTE — PROGRESS NOTES
Hospitalist Progress Note    Patient: Marta Wilson MRN: [id number]  CSN: [id number]    YOB: 1950  Age: 70 y.o. Sex: female    DOA: 11/3/2021 LOS:  LOS: 10 days            Patient Active Problem List   Diagnosis Code    Acute respiratory failure due to COVID-19 ([address] U07.1, J96.00    Pneumonia due to COVID-19 virus U07.1, J12.82    HTN (hypertension) I10    Diabetes mellitus type 2, controlled ([address] E11.9        IMPRESSION and Plan:    Marta Wilson is a 70 y.o. female with   Patient Active Problem List    Diagnosis Date Noted    Acute respiratory failure due to COVID-19 ([address] 11/03/2021    Pneumonia due to COVID-19 virus 11/03/2021    HTN (hypertension) 11/03/2021    Diabetes mellitus type 2, controlled ([address] 11/03/2021     Principal Problem:    Pneumonia due to COVID-19 virus (11/3/2021)    Active Problems:    Acute respiratory failure due to COVID-19 ([address] (11/3/2021)      HTN (hypertension) (11/3/2021)      Diabetes mellitus type 2, controlled ([address] (11/3/2021)               acute respiratory failure with hypoxia   need high flow 40 L , 90 % oxyg, abg hypoxia   Continue supportive treatment      Pneumonia due to Covid with hypoxia,  Continue iv deca drone, ID f/u -received Tociluzuma   Completed remdesivir  Continue supportive treatment   duplex of lower extremity neg for DVT  Ferritin trending down     Hyperglycemia/diabetes:   Continue lantus 10  SSI PRN add premeal insulin     Hypertension  Continue hydralazine as needed     Mild increase in liver enzymes,   Due to viral infection   lft wnl          Hypokalemia-resolved      Patient's condition is         Recommend to continue hospitalization. Discussed with patient. Chief Complaints:   Chief Complaint   Patient presents with    Shortness of Breath     SUBJECTIVE:  Pt is seen and examined. No CP or SOB  No Fever, chills, Nausea, vomitting.            Review of systems:    ROS    PE:  Patient Vitals for the past 24 hrs:   BP Temp Pulse Resp SpO2   11/13/21 1130 (!) 102/53  78 24 96 %   11/13/21 1100 (!) 90/48 98.7 °F (37.1 °C) 75 23 97 %   11/13/21 1030   77 24 95 %   11/13/21 1000 (!) 80/52  73 22 95 %   11/13/21 0930   80 27 (!) 89 %   11/13/21 0900 118/68  66 22 98 %   11/13/21 0830   66 18 96 %   11/13/21 0818     97 %   11/13/21 0600 121/61  61 17 98 %   11/13/21 0500 118/65  67 20 98 %   11/13/21 0450    19 98 %   11/13/21 0400 (!) 103/53 98.8 °F (37.1 °C) 61 16 97 %   11/13/21 0300 (!) 123/59  80 20 97 %   11/13/21 0200 (!) 110/51  63 16 97 %   11/13/21 0100 (!) 116/58  64 18 95 %   11/13/21 0026    21 95 %   11/13/21 0000 (!) 104/58  73 23 96 %   11/12/21 2300 (!) 101/57 98.7 °F (37.1 °C) 75 23 96 %   11/12/21 2200 (!) 91/55  67 18 96 %   11/12/21 2100 (!) 94/46  66 17 97 %   11/12/21 2022    19 98 %   11/12/21 2000 (!) 104/49  72 20 94 %   11/12/21 1900 (!) 113/49 99 °F (37.2 °C) 70 17 97 %   11/12/21 1830   75 21 97 %   11/12/21 1800 (!) 95/50  82 22 96 %   11/12/21 1730   78 26 95 %   11/12/21 1704  98.9 °F (37.2 °C)      11/12/21 1700 (!) 106/55  76 26 96 %   11/12/21 1630   67 20 96 %   11/12/21 1605     97 %   11/12/21 1600 (!) 80/42  64 20 97 %       Intake/Output Summary (Last 24 hours) at 11/13/2021 1204  Last data filed at 11/13/2021 0400  Gross per 24 hour   Intake    Output 650 ml   Net -650 ml     Patient Vitals for the past 120 hrs:   Weight   11/10/21 0723 78.7 kg (173 lb 8 oz)   11/11/21 1600 79.1 kg (174 lb 6.1 oz)         Physical Exam        Intake and Output:  Current Shift:  No intake/output data recorded.   Last three shifts:  11/11 1901 - 11/13 0700  In: 240 [P.O.:240]  Out: 1500 [Urine:1500]    Lab/Data Reviewed:  Recent Results (from the past 8 hour(s))   D DIMER    Collection Time: 11/13/21  6:15 AM   Result Value Ref Range    D DIMER 1.03 (H) <0.46 ug/ml(FEU)   CBC WITH AUTOMATED DIFF    Collection Time: 11/13/21  6:15 AM   Result Value Ref Range    WBC 7.2 4.6 - 13.2 K/uL    RBC 3.88 (L) 4.20 - 5.30 M/uL    HGB 10.3 (L) 12.0 - 16.0 g/dL    HCT 34.4 (L) 35.0 - 45.0 %    MCV 88.7 78.0 - 100.0 FL    MCH 26.5 24.0 - 34.0 PG    MCHC 29.9 (L) 31.0 - 37.0 g/dL    RDW 13.6 11.6 - 14.5 %    PLATELET 928 803 - 326 K/uL    MPV 10.6 9.2 - 11.8 FL    NRBC 0.3 (H) 0  WBC    ABSOLUTE NRBC 0.02 (H) 0.00 - 0.01 K/uL    NEUTROPHILS 87 (H) 40 - 73 %    LYMPHOCYTES 8 (L) 21 - 52 %    MONOCYTES 4 3 - 10 %    EOSINOPHILS 0 0 - 5 %    BASOPHILS 0 0 - 2 %    IMMATURE GRANULOCYTES 2 (H) 0.0 - 0.5 %    ABS. NEUTROPHILS 6.3 1.8 - 8.0 K/UL    ABS. LYMPHOCYTES 0.6 (L) 0.9 - 3.6 K/UL    ABS. MONOCYTES 0.3 0.05 - 1.2 K/UL    ABS. EOSINOPHILS 0.0 0.0 - 0.4 K/UL    ABS. BASOPHILS 0.0 0.0 - 0.1 K/UL    ABS. IMM. GRANS. 0.1 (H) 0.00 - 0.04 K/UL    DF AUTOMATED     METABOLIC PANEL, COMPREHENSIVE    Collection Time: 11/13/21  6:15 AM   Result Value Ref Range    Sodium 138 136 - 145 mmol/L    Potassium 4.9 3.5 - 5.5 mmol/L    Chloride 104 100 - 111 mmol/L    CO2 31 21 - 32 mmol/L    Anion gap 3 3.0 - 18 mmol/L    Glucose 171 (H) 74 - 99 mg/dL    BUN 19 (H) 7.0 - 18 MG/DL    Creatinine 0.65 0.6 - 1.3 MG/DL    BUN/Creatinine ratio 29 (H) 12 - 20      GFR est AA >60 >60 ml/min/1.73m2    GFR est non-AA >60 >60 ml/min/1.73m2    Calcium 8.3 (L) 8.5 - 10.1 MG/DL    Bilirubin, total 0.5 0.2 - 1.0 MG/DL    ALT (SGPT) 37 13 - 56 U/L    AST (SGOT) 23 10 - 38 U/L    Alk.  phosphatase 95 45 - 117 U/L    Protein, total 5.8 (L) 6.4 - 8.2 g/dL    Albumin 2.0 (L) 3.4 - 5.0 g/dL    Globulin 3.8 2.0 - 4.0 g/dL    A-G Ratio 0.5 (L) 0.8 - 1.7     GLUCOSE, POC    Collection Time: 11/13/21 11:11 AM   Result Value Ref Range    Glucose (POC) 176 (H) 70 - 110 mg/dL     Medications:  Current Facility-Administered Medications   Medication Dose Route Frequency    thiamine mononitrate (B-1) tablet 100 mg  100 mg Oral DAILY    insulin lispro (HUMALOG) injection 3 Units  3 Units SubCUTAneous TIDAC    insulin glargine (LANTUS) injection 10 Units  10 Units SubCUTAneous QHS    pantoprazole (PROTONIX) tablet 40 mg  40 mg Oral ACB    dexamethasone (DECADRON) 4 mg/mL injection 10 mg  10 mg IntraVENous Q24H    loperamide (IMODIUM) capsule 4 mg  4 mg Oral Q4H PRN    sodium chloride (NS) flush 5-10 mL  5-10 mL IntraVENous PRN    enoxaparin (LOVENOX) injection 30 mg  30 mg SubCUTAneous Q12H    acetaminophen (TYLENOL) tablet 650 mg  650 mg Oral Q6H PRN    Or    acetaminophen (TYLENOL) suppository 650 mg  650 mg Rectal Q6H PRN    guaiFENesin-dextromethorphan (ROBITUSSIN DM) 100-10 mg/5 mL syrup 5 mL  5 mL Oral Q4H PRN    cholecalciferol (VITAMIN D3) (1000 Units /25 mcg) tablet 2,000 Units  2,000 Units Oral DAILY    insulin lispro (HUMALOG) injection   SubCUTAneous AC&HS    glucose chewable tablet 16 g  4 Tablet Oral PRN    glucagon (GLUCAGEN) injection 1 mg  1 mg IntraMUSCular PRN    dextrose (D50W) injection syrg 12.5-25 g  25-50 mL IntraVENous PRN    sodium chloride (NS) flush 5-40 mL  5-40 mL IntraVENous Q8H    sodium chloride (NS) flush 5-40 mL  5-40 mL IntraVENous PRN    polyethylene glycol (MIRALAX) packet 17 g  17 g Oral DAILY PRN    ondansetron (ZOFRAN ODT) tablet 4 mg  4 mg Oral Q8H PRN    Or    ondansetron (ZOFRAN) injection 4 mg  4 mg IntraVENous Q6H PRN    zinc sulfate (ZINCATE) 50 mg zinc (220 mg) capsule 1 Capsule  1 Capsule Oral DAILY    ascorbic acid (vitamin C) (VITAMIN C) tablet 500 mg  500 mg Oral BID    melatonin (rapid dissolve) tablet 10 mg  10 mg Oral QHS    hydrALAZINE (APRESOLINE) 20 mg/mL injection 10 mg  10 mg IntraVENous Q6H PRN       Recent Results (from the past 24 hour(s))   GLUCOSE, POC    Collection Time: 11/12/21 12:14 PM   Result Value Ref Range    Glucose (POC) 190 (H) 70 - 110 mg/dL   GLUCOSE, POC    Collection Time: 11/12/21  4:57 PM   Result Value Ref Range    Glucose (POC) 150 (H) 70 - 110 mg/dL   GLUCOSE, POC    Collection Time: 11/12/21  9:15 PM   Result Value Ref Range Glucose (POC) 162 (H) 70 - 110 mg/dL   D DIMER    Collection Time: 11/13/21  6:15 AM   Result Value Ref Range    D DIMER 1.03 (H) <0.46 ug/ml(FEU)   CBC WITH AUTOMATED DIFF    Collection Time: 11/13/21  6:15 AM   Result Value Ref Range    WBC 7.2 4.6 - 13.2 K/uL    RBC 3.88 (L) 4.20 - 5.30 M/uL    HGB 10.3 (L) 12.0 - 16.0 g/dL    HCT 34.4 (L) 35.0 - 45.0 %    MCV 88.7 78.0 - 100.0 FL    MCH 26.5 24.0 - 34.0 PG    MCHC 29.9 (L) 31.0 - 37.0 g/dL    RDW 13.6 11.6 - 14.5 %    PLATELET 034 587 - 192 K/uL    MPV 10.6 9.2 - 11.8 FL    NRBC 0.3 (H) 0  WBC    ABSOLUTE NRBC 0.02 (H) 0.00 - 0.01 K/uL    NEUTROPHILS 87 (H) 40 - 73 %    LYMPHOCYTES 8 (L) 21 - 52 %    MONOCYTES 4 3 - 10 %    EOSINOPHILS 0 0 - 5 %    BASOPHILS 0 0 - 2 %    IMMATURE GRANULOCYTES 2 (H) 0.0 - 0.5 %    ABS. NEUTROPHILS 6.3 1.8 - 8.0 K/UL    ABS. LYMPHOCYTES 0.6 (L) 0.9 - 3.6 K/UL    ABS. MONOCYTES 0.3 0.05 - 1.2 K/UL    ABS. EOSINOPHILS 0.0 0.0 - 0.4 K/UL    ABS. BASOPHILS 0.0 0.0 - 0.1 K/UL    ABS. IMM. GRANS. 0.1 (H) 0.00 - 0.04 K/UL    DF AUTOMATED     METABOLIC PANEL, COMPREHENSIVE    Collection Time: 11/13/21  6:15 AM   Result Value Ref Range    Sodium 138 136 - 145 mmol/L    Potassium 4.9 3.5 - 5.5 mmol/L    Chloride 104 100 - 111 mmol/L    CO2 31 21 - 32 mmol/L    Anion gap 3 3.0 - 18 mmol/L    Glucose 171 (H) 74 - 99 mg/dL    BUN 19 (H) 7.0 - 18 MG/DL    Creatinine 0.65 0.6 - 1.3 MG/DL    BUN/Creatinine ratio 29 (H) 12 - 20      GFR est AA >60 >60 ml/min/1.73m2    GFR est non-AA >60 >60 ml/min/1.73m2    Calcium 8.3 (L) 8.5 - 10.1 MG/DL    Bilirubin, total 0.5 0.2 - 1.0 MG/DL    ALT (SGPT) 37 13 - 56 U/L    AST (SGOT) 23 10 - 38 U/L    Alk.  phosphatase 95 45 - 117 U/L    Protein, total 5.8 (L) 6.4 - 8.2 g/dL    Albumin 2.0 (L) 3.4 - 5.0 g/dL    Globulin 3.8 2.0 - 4.0 g/dL    A-G Ratio 0.5 (L) 0.8 - 1.7     GLUCOSE, POC    Collection Time: 11/13/21 11:11 AM   Result Value Ref Range    Glucose (POC) 176 (H) 70 - 110 mg/dL Procedures/imaging: see electronic medical records for all procedures/Xrays and details which were not copied into this note but were reviewed prior to creation of Plan    Elo Rodriges MD   11/13/2021, 12:04 PM

## 2021-11-13 NOTE — PROGRESS NOTES
11/13/2021 PT note:  Chart reviewed and will continue to hold PT treatment as per previous rehab dept note, until 11/115/2021 or pulmonary clears to resume. Thank you.    Barby Holland, PT

## 2021-11-13 NOTE — PROGRESS NOTES
Pulmonary Specialists  Pulmonary, Critical Care, and Sleep Medicine    Name: Apolinar Dandy MRN: [id number]   : 1950 Hospital: [facility] [place]    Date: 2021  Room: [address] [place] [address] Note                                              Consult requesting physician: Dr. Tito Tapia  Reason for Consult: Acute hypoxic respiratory failure    IMPRESSION:   ·   Patient Active Problem List   Diagnosis Code    Acute respiratory failure due to COVID-19 ([facility] U07.1, J96.00    Pneumonia due to COVID-19 virus U07.1, J12.82    HTN (hypertension) I10    Diabetes mellitus type 2, controlled ([facility] E11.9 ·   Anemia  · Code status: Full code   RECOMMENDATIONS:   Respiratory: Acute hypoxic respiratory failure due to diffuse severe COVID-19 pneumonia  Improvement in flow and FiO2 need today from yesterday  FiO2: 40%, flow 40 L/min  Xr chest for follow up in AM  HFNC vs PAP vs early intubation: Avoid CPAP/BiPAP. Data suggest against early intubation with SPO2 goal > 85% acceptable. C/w HFNC. Patient made aware of using high O2 can lead to oxygen toxicity and its consequences including pulmonary fibrosis. Proning: early prone positioning recommended. Patient not adherent to optimal proning  If intubated, avoid early high PEEP if possible. Inhaled NO and prostacyclin may be beneficial, but not available at [facility]. Keep SPO2 >= 85%. HOB 30 degree elevation all the time. Aggressive pulmonary toileting. Aspiration precautions. Incentive spirometry when not intubated. CVS: Hemodynamically stable  Cardiac enzymes and pro BNP normal  Anticoagulation: Lovenox 30 mg every 12. If increasing d-dimer and suspected pulmonary microthrombi or VTE, consider full dose anticoagulation. PVL BL LE 21  · No evidence of deep vein thrombosis in the right lower extremity.   · No evidence of deep vein thrombosis in the left lower extremity    ID: ID following  Severe COVID-19 pneumonia with hypoxic respiratory failure. COVID-19 vaccine status: not taken  COVID-19 test: 11/3/21  Not candidate for antibodies such as Bamlanivimab, Regeneron or Casirivimib plus Imdevimab   Remdesivir: finished 5 days from 11/3/21  Dexamethasone: 10 mg daily - taper as per clinical course  Actemra: 11/11/21  Monitor for cytokine surge  Difficulty enrolling in research trial.  Vit C, Vit D3, Thiamine 100 mg daily, Zinc sulfate 1 cap daily, Melatonin 5 mg Qhs. Avoid NSAID. Use tylenol for fever and pain. Rapid influenza test: Negative. Blood cx 11/3/21: Staph Coag negative - likely contamination    Viral hepatitis panel: negative   HIV: negative   QuantiFERON-TB gold: negative  Daily CBC with diff, CMP, d-dimer, LDH, CRP. IL-6 (send out test and may not result timely) per clinical course    Hematology/Oncology: Monitor daily CBC, D-Dimer  Anticoagulation Lovenox 30 mg Q12   Renal: normal renal function  Hypovolemia due to fever and hyperventilation; and renal microthrombi which could occur in COVID19 infection; leading to renal failure and need for CRRT (which is not available at THE [facility]) or HD. Avoid hypervolemia. Gentle hydration as needed. Monitor volume status, UOP and renal functions closely. GI/: Monitor LFT daily. Endocrine: Monitor FSBS. SSI as needed  Neurology: AAOx 3, non-focal exam  Toxicology: none  Pain/Sedation: none  Skin/Wound: none  Electrolytes: Replace electrolytes per ICU electrolyte replacement protocol. Nutrition: diet  Prophylaxis: DVT Prophylaxis: SCD/Lovenox. GI Prophylaxis: Protonix. Restraints: none  Lines/Tubes: PIV  Advance Directive/Palliative Care: full code, palliative care consulted per clinical course    Will defer respective systems problem management to primary and other respective consultant and follow patient in ICU with primary and other medical team.  Further recommendations will be based on the patient's response to recommended treatment and results of the investigation ordered.   Quality Care: PPI, DVT prophylaxis, HOB elevated, Infection control all reviewed and addressed. Care of plan d/w RN, RT, hospitalist team, ID  D/w patient (answered all questions to satisfaction). High complexity decision making was performed during the evaluation of this patient at high risk for decompensation with multiple organ involvement. Total critical care time spent rendering care exclusive of procedures/family discussion/coordination of care: 36 minutes. NOTE:  Positive COVID19 infection (Coronavirus), unknown disease course and outcome, consequences including but not limited to respiratory failure requiring ventilator use, cardiac failure and multiorgan failure leading to death, limited investigational treatment based on limited data which may or may not help and can be harmful. [id number] patients on ventilator support are being reported to have a poor prognosis with severe hypoxemia and/or viral cardiomyopathy related cardiac arrest, and high mortality compared to illness. The clinical spectrum seems to fluctuate in severity between patients, variant and also the clinical spectrum changes in the same patient. Medications such as Tocilizumab, Bamlanivimab, Regeneron or Casirivimib plus Imdevimab, Baricitinib plus Ramdesivir, anti-viral medications, plasma and other are investigational, and mostly EUA by FDA recommended, some have fallen out of favor due to lack of clinical benefit. Difficulty to enroll in clinical trials as research centers may not be located locally or easily accessible. Not all medications are easily available and response may vary from variants, patient to patient. These meds are being used based on experience from SARS-CoV infection in past experiences from [facility], [facility] and other world Covid 19 cases. Certain management strategies are based on anecdotal evidence seen in COVID-19 patients.  Since it is a recent pandemic, multiple variants and evolving, many trials, studies and case reports are being reported early, and no specific consensus guidelines from big societies are evident. Patient discussion:  Patient is educated about COVID19 infection, spread of infection, unknown disease course and outcome, multiple variants, consequences including but not limited to respiratory failure requiring ventilator use, cardiac failure and multiorgan failure leading to death, limited investigational treatment based on limited data which may or may not help and can be harmful. She verbalized understanding and agreed to try investigational therapy with unknown outcome. The management strategies including medications and ventilator support are based on recommendations from center such as [address] [place], [place] [address]nate B[place], [facility], [employer] , Candida Reid, other in COVID-19 patients, and medications and ventilator management strategies are evolving based on new data. Anticoagulation seems to be current standard of care for people with hypoxemic respiratory failure with COVID pneumonia. Dexamethasone (Recovery trial-[place]) or Solu-Medrol being recommended for treatment of COVID pneumonia needing oxygen supplementation. Proning is recommended for patients with hypoxemic respiratory failure. This provided treatment/management may cover potential off-label and compassionate use of medications and/or immunosuppressant for COVID19 positive patient in the context of a public health emergency related to Jose Martin Ordonez (a.k.a the Coronavirus) and in connection with the state of emergency declared by the [address] [place] [address]. Given emerging nature of this infection, further treatment could be underway simultaneously and may not be available to the provider. Some of the services which may be beneficial but are not available at THE [facility] facility (like inhaled nitric oxide, CRRT, ECMO). Some of the labs are not available and send out and may not be resulted on timely manner.  Recommended management may not guarantee outcome nor substitute for the practice of medicine for any medical society, clinical evidence based professional advice, work up or treatments and meant to adopt to each specific patient based on the current condition, presentation, consideration of the patient's needs, the resources available at the location from where the medical professional services are being provided and any other unique circumstances. The CDC federal and state guidelines have emphasized repeatedly the importance of vaccination to prevent severe infection, mortality, disabilities from [id number] with several SARS-CoV2 strains currently in the [place]. The treatment protocols are followed based on local hospital protocols, with guidance from centers such as [facility] protocols, [facility], other. The local hospital protocols have been guided by THE [facility] pharmacy department. THE [facility] is not part of any research protocol. Several trials with different medications are ongoing in the [place] and rest of the world, with changes being recommended. These changes are just recommendations, and may not be available at local facilities, and may not be approved by FDA for the [facility]. The ICU team are in constant communication with hospital pharmacy and administration, ID to provide best possible management for COVID 19 pneumonia patients in this hospital. This is discussed with the patient who verbalized understanding. NOTE  UPTODATE- REMDESIVIR  Dosing: Hepatic Impairment: Adult  Baseline hepatic impairment: There are no dosage adjustments provided (has not been studied); not recommended to be used in patients with baseline ALT ? 5 times the ULN (FDA 2020a). Hepatoxicity during therapy:  ALT ? 5 times the ULN: Discontinue remdesivir; may resume when ALT is <5 times the ULN (FDA 2020a).   ALT elevation AND signs or symptoms of liver inflammation or increasing conjugated bilirubin, alkaline phosphatase, or INR: Discontinue remdesivir (FDA 2020a). Upper limit of transaminases variable, and clinical context needed; first do no harm with medicationsespecially if the medications have not been established to be conclusively beneficial; consider risks of side effects versus potential benefits with medications such as remdesivir, tocilizumab in the setting of acute hepatitis. Remdesivir has been recently approved by FDA, but recent WHO trials failed to show mortality benefit. These meds are being used based on experience from [address] [place] [address] and global treatment protocols from various institutions. Positive COVID19 infection (Coronavirus), unknown disease course and outcome, multiple variants, consequences including but not limited to respiratory failure requiring ventilator use, cardiac failure and multiorgan failure leading to death, limited investigational treatment based on limited data which may or may not help, and may not change the prognosis. [id number] patients on ventilator support are being reported to have a poor prognosis with severe hypoxemia and/or viral cardiomyopathy related cardiac arrest, and high mortality compared to influenza illness. The clinical spectrum seems to fluctuate in severity between patients, variant to variant and also the clinical spectrum changes in the same patient. Tocilizumab, Bamlanivimab, Regeneron or Casirivimib plus Imdevimab, Baricitinib plus Ramdesivir, anti-viral medications, plasma and other are investigational, and mostly EUA by FDA recommended, some have fallen out of favor due to lack of clinical benefit. Difficulty to enroll in clinical trials as research centers may not be located locally or easily accessible. Subjective/History of Present Illness:   Patient is a 70 y.o. female with PMHx significant for HTN seen at the request of Dr. Papi Henderson to assist during ICU care for acute hypoxic respiratory failure with Covid19 pneumonia.  help provided through hospital used during this evaluation. She is unvaccinated female admitted on 11/3/2021 with progressive shortness of breath, fever, body ache for at least 4 days prior to admission. In ER, noted to be hypoxic requiring supplemental oxygen and further testing suggested COVID-19 infection, multilobar pneumonia without PE on CTA chest.  Patient denies any family member with COVID-19 infection while historically multiple family members were diagnosed with COVID-19 infection per staff. She received remdesivir, steroid and anticoagulation prophylaxis. Her hospital course is significant for progressive increasing need of supplemental oxygen to HFNC when hospitalist transferred the patient to the ICU. Patient seen at bedside in room #353. She feels no worsening in her dyspnea compared to admission despite increased need for O2. The patient reports wet cough denies phlegm, chest pain, wheezing or hemoptysis, diarrhea, nausea, vomiting, change in the taste or smell. She has received today Actemra. Travel Hx: none  Exposure to suspected COVID19 patient: unknown  COVID19 vaccination: not taken    : Juan David Dickerson ID# [id number]    I/O last 24 hrs:      Intake/Output Summary (Last 24 hours) at 11/13/2021 1556  Last data filed at 11/13/2021 1343  Gross per 24 hour   Intake    Output 1350 ml   Net -1350 ml       11/13/21   Patient seen at bedside in ICU room #1 10 under proper PPE  Hospital provided interpreting services used  Not doing proning despite multiple prior discussions  Patient has improving needs for O2 on HFNC  Now on 40 L/min and 40% FiO2  Overall appears to be pleasant and in no respiratory distress  Continues with wet cough without significant phlegm  Patient denies any wheezing, CP, hemoptysis, fever, chills  Hemodynamically stable  Good p.o. intake and voiding urine  I was not contacted by staff on anything about patient overnight      Review of Systems:  General ROS: negative for - fever, chills, weight loss  Cardiovascular ROS: negative for - chest pain, edema, murmur, orthopnea, palpitations or pnd  Gastrointestinal ROS: no nausea, vomiting, abdominal pain, change in bowel habits  Dermatological ROS: negative for - pruritus, rash or skin lesion changes       No Known Allergies     Past Medical History:   Diagnosis Date    Diabetes ([address]     HTN (hypertension)     Hyperlipidemia       History reviewed. No pertinent surgical history.      Social History     Tobacco Use    Smoking status: Never Smoker    Smokeless tobacco: Never Used   Substance Use Topics    Alcohol use: Yes      Family History   Problem Relation Age of Onset    Hypertension Mother       Prior to Admission medications    Not on File     Current Facility-Administered Medications   Medication Dose Route Frequency    thiamine mononitrate (B-1) tablet 100 mg  100 mg Oral DAILY    insulin lispro (HUMALOG) injection 3 Units  3 Units SubCUTAneous TIDAC    insulin glargine (LANTUS) injection 10 Units  10 Units SubCUTAneous QHS    pantoprazole (PROTONIX) tablet 40 mg  40 mg Oral ACB    dexamethasone (DECADRON) 4 mg/mL injection 10 mg  10 mg IntraVENous Q24H    enoxaparin (LOVENOX) injection 30 mg  30 mg SubCUTAneous Q12H    cholecalciferol (VITAMIN D3) (1000 Units /25 mcg) tablet 2,000 Units  2,000 Units Oral DAILY    insulin lispro (HUMALOG) injection   SubCUTAneous AC&HS    sodium chloride (NS) flush 5-40 mL  5-40 mL IntraVENous Q8H    zinc sulfate (ZINCATE) 50 mg zinc (220 mg) capsule 1 Capsule  1 Capsule Oral DAILY    ascorbic acid (vitamin C) (VITAMIN C) tablet 500 mg  500 mg Oral BID    melatonin (rapid dissolve) tablet 10 mg  10 mg Oral QHS         Objective:   Vital Signs:    Visit Vitals  /62   Pulse 73   Temp 98.7 °F (37.1 °C)   Resp 20   Ht 5' 3\" (1.6 m)   Wt 79.1 kg (174 lb 6.1 oz)   SpO2 95%   BMI 30.89 kg/m²       O2 Device: Hi flow nasal cannula, Heated   O2 Flow Rate (L/min): 40 l/min   Temp (24hrs), Av.8 °F (37.1 °C), Min:98.7 °F (37.1 °C), Max:99 °F (37.2 °C)       Intake/Output:   Last shift:      11/13 0701 - 11/13 1900  In: -   Out: 700 [Urine:700]    Last 3 shifts: 11/11 1901 - 11/13 0700  In: 240 [P.O.:240]  Out: 1500 [Urine:1500]      Intake/Output Summary (Last 24 hours) at 11/13/2021 1556  Last data filed at 11/13/2021 1343  Gross per 24 hour   Intake    Output 1350 ml   Net -1350 ml       Last 3 Recorded Weights in this Encounter    11/08/21 0422 11/10/21 0723 11/11/21 1600   Weight: 80.6 kg (177 lb 11.1 oz) 78.7 kg (173 lb 8 oz) 79.1 kg (174 lb 6.1 oz)         Recent Labs     11/11/21  1032   PHI 7.46*   PCO2I 39.3   PO2I 77*   HCO3I 27.8*   FIO2I 90       Physical Exam: Limitations in exam due to covid 19 isolation and full PPE requirement  General: in no respiratory distress and AAO x 3, cooperative, no distress, appears stated age, on HFNC  HEENT: PERRLA, visible oral mucosa moist  Neck: No abnormally enlarged lymph nodes or thyroid, supple  Chest: Obese chest wall limiting exam  Lungs:  Moderate air entry, rhonchi scattered bilaterally, no tenderness/ rash  Heart: Regular rate and rhythm, S1S2 present or without murmur or extra heart sounds  Abdomen: Protuberant, bowel sounds normoactive, tympanic, soft without significant tenderness, masses, organomegaly or guarding, rigidity, rebound  Extremity: no edema, cyanosis  Neuro: alert, oriented x 3, no defects noted in limited exam.  Skin: Skin color, texture, turgor fair      Data:       Recent Results (from the past 24 hour(s))   GLUCOSE, POC    Collection Time: 11/12/21  4:57 PM   Result Value Ref Range    Glucose (POC) 150 (H) 70 - 110 mg/dL   GLUCOSE, POC    Collection Time: 11/12/21  9:15 PM   Result Value Ref Range    Glucose (POC) 162 (H) 70 - 110 mg/dL   FERRITIN    Collection Time: 11/13/21  6:14 AM   Result Value Ref Range    Ferritin 486 (H) 8 - 388 NG/ML   C REACTIVE PROTEIN, QT    Collection Time: 11/13/21  6:14 AM   Result Value Ref Range    C-Reactive protein 1.1 (H) 0 - 0.3 mg/dL LD    Collection Time: 11/13/21  6:14 AM   Result Value Ref Range     (H) 81 - 234 U/L   D DIMER    Collection Time: 11/13/21  6:15 AM   Result Value Ref Range    D DIMER 1.03 (H) <0.46 ug/ml(FEU)   CBC WITH AUTOMATED DIFF    Collection Time: 11/13/21  6:15 AM   Result Value Ref Range    WBC 7.2 4.6 - 13.2 K/uL    RBC 3.88 (L) 4.20 - 5.30 M/uL    HGB 10.3 (L) 12.0 - 16.0 g/dL    HCT 34.4 (L) 35.0 - 45.0 %    MCV 88.7 78.0 - 100.0 FL    MCH 26.5 24.0 - 34.0 PG    MCHC 29.9 (L) 31.0 - 37.0 g/dL    RDW 13.6 11.6 - 14.5 %    PLATELET 640 247 - 035 K/uL    MPV 10.6 9.2 - 11.8 FL    NRBC 0.3 (H) 0  WBC    ABSOLUTE NRBC 0.02 (H) 0.00 - 0.01 K/uL    NEUTROPHILS 87 (H) 40 - 73 %    LYMPHOCYTES 8 (L) 21 - 52 %    MONOCYTES 4 3 - 10 %    EOSINOPHILS 0 0 - 5 %    BASOPHILS 0 0 - 2 %    IMMATURE GRANULOCYTES 2 (H) 0.0 - 0.5 %    ABS. NEUTROPHILS 6.3 1.8 - 8.0 K/UL    ABS. LYMPHOCYTES 0.6 (L) 0.9 - 3.6 K/UL    ABS. MONOCYTES 0.3 0.05 - 1.2 K/UL    ABS. EOSINOPHILS 0.0 0.0 - 0.4 K/UL    ABS. BASOPHILS 0.0 0.0 - 0.1 K/UL    ABS. IMM. GRANS. 0.1 (H) 0.00 - 0.04 K/UL    DF AUTOMATED     METABOLIC PANEL, COMPREHENSIVE    Collection Time: 11/13/21  6:15 AM   Result Value Ref Range    Sodium 138 136 - 145 mmol/L    Potassium 4.9 3.5 - 5.5 mmol/L    Chloride 104 100 - 111 mmol/L    CO2 31 21 - 32 mmol/L    Anion gap 3 3.0 - 18 mmol/L    Glucose 171 (H) 74 - 99 mg/dL    BUN 19 (H) 7.0 - 18 MG/DL    Creatinine 0.65 0.6 - 1.3 MG/DL    BUN/Creatinine ratio 29 (H) 12 - 20      GFR est AA >60 >60 ml/min/1.73m2    GFR est non-AA >60 >60 ml/min/1.73m2    Calcium 8.3 (L) 8.5 - 10.1 MG/DL    Bilirubin, total 0.5 0.2 - 1.0 MG/DL    ALT (SGPT) 37 13 - 56 U/L    AST (SGOT) 23 10 - 38 U/L    Alk.  phosphatase 95 45 - 117 U/L    Protein, total 5.8 (L) 6.4 - 8.2 g/dL    Albumin 2.0 (L) 3.4 - 5.0 g/dL    Globulin 3.8 2.0 - 4.0 g/dL    A-G Ratio 0.5 (L) 0.8 - 1.7     GLUCOSE, POC    Collection Time: 11/13/21 11:11 AM   Result Value Ref Range Glucose (POC) 176 (H) 70 - 110 mg/dL         Chemistry Recent Labs     11/13/21  0615 11/12/21 0419 11/11/21 0446   * 189* 355*    138 134*   K 4.9 4.8 4.4    102 98*   CO2 31 32 32   BUN 19* 18 18   CREA 0.65 0.66 0.86   CA 8.3* 8.5 8.0*   AGAP 3 4 4   BUCR 29* 27* 21*   AP 95 97 112   TP 5.8* 5.8* 5.9*   ALB 2.0* 2.0* 2.0*   GLOB 3.8 3.8 3.9   AGRAT 0.5* 0.5* 0.5*        Lactic Acid No results found for: LAC  No results for input(s): LAC in the last 72 hours. Liver Enzymes Protein, total   Date Value Ref Range Status   11/13/2021 5.8 (L) 6.4 - 8.2 g/dL Final     Albumin   Date Value Ref Range Status   11/13/2021 2.0 (L) 3.4 - 5.0 g/dL Final     Globulin   Date Value Ref Range Status   11/13/2021 3.8 2.0 - 4.0 g/dL Final     A-G Ratio   Date Value Ref Range Status   11/13/2021 0.5 (L) 0.8 - 1.7   Final     Alk. phosphatase   Date Value Ref Range Status   11/13/2021 95 45 - 117 U/L Final     Recent Labs     11/13/21  0615 11/12/21 0419 11/11/21 0446   TP 5.8* 5.8* 5.9*   ALB 2.0* 2.0* 2.0*   GLOB 3.8 3.8 3.9   AGRAT 0.5* 0.5* 0.5*   AP 95 97 112        CBC w/Diff Recent Labs     11/13/21  0615 11/12/21 0419 11/11/21 0446   WBC 7.2 8.4 11.6   RBC 3.88* 3.93* 3.85*   HGB 10.3* 10.3* 10.4*   HCT 34.4* 34.1* 33.2*    466* 443*   GRANS 87* 87* 85*   LYMPH 8* 7* 6*   EOS 0 0 0        Cardiac Enzymes No results found for: CPK, CK, CKMMB, CKMB, RCK3, CKMBT, CKNDX, CKND1, ROSINA, TROPT, TROIQ, MEDHAT, TROPT, TNIPOC, BNP, BNPP     BNP No results found for: BNP, BNPP, XBNPT     Coagulation No results for input(s): PTP, INR, APTT, INREXT, INREXT in the last 72 hours. Thyroid  No results found for: T4, T3U, TSH, TSHEXT, TSHEXT    No results found for: T4     Urinalysis No results found for: COLOR, APPRN, SPGRU, REFSG, [place], PROTU, GLUCU, KETU, BILU, UROU, STAR, LEUKU, GLUKE, EPSU, BACTU, WBCU, RBCU, CASTS, UCRY     Micro  No results for input(s): SDES, CULT in the last 72 hours.   No results for input(s): CULT in the last 72 hours. Culture data during this hospitalization. All Micro Results     Procedure Component Value Units Date/Time    CULTURE, BLOOD [[id number]]  (Abnormal) Collected: 11/03/21 1746    Order Status: Completed Specimen: Blood Updated: 11/05/21 1227     Special Requests: NO SPECIAL REQUESTS        GRAM STAIN       AEROBIC BOTTLE GRAM POSITIVE COCCI IN CLUSTERS                  SMEAR CALLED TO AND CORRECTLY REPEATED BY: Johnny López RN 3N BY CAM ON 11/4/21 AT 1233 [address]. GRAM POSITIVE COCCI IN CLUSTERS ANAEROBIC BOTTLE                  SMEAR CALLED TO AND CORRECTLY REPEATED BY: Analisa Tinajero RN 3N AT 1500 ON 11/04/2021 TO [facility]. Culture result:       STAPHYLOCOCCUS SPECIES, COAGULASE NEGATIVE MULTIPLE COLONY TYPES/STRAINS GROWING IN THE AEROBIC AND ANAEROBIC BOTTLES NO SITE INDICATED          LEGIONELLA PNEUMOPHILA AG, URINE [[id number]] Collected: 11/03/21 2045    Order Status: Canceled Specimen: Urine     STREP [place], URINE [[id number]] Collected: 11/03/21 2045    Order Status: Canceled Specimen: Urine     CULTURE, RESPIRATORY/SPUTUM/BRONCH Edwyna Haile STAIN [[id number]] Collected: 11/03/21 2045    Order Status: Canceled Specimen: Sputum     INFLUENZA A & B AG (RAPID TEST) [[id number]] Collected: 11/03/21 1813    Order Status: Completed Specimen: Nasopharyngeal from Nasal washing Updated: 11/03/21 1843     Influenza A Antigen Negative        Comment: A negative result does not exclude influenza virus infection, seasonal or H1N1 due to suboptimal sensitivity. If influenza is circulating in your community, a diagnosis of influenza should be considered based on a patients clinical presentation and empiric antiviral treatment should be considered, if indicated.         Influenza B Antigen Negative       COVID-19 RAPID TEST [[id number]]  (Abnormal) Collected: 11/03/21 1813    Order Status: Completed Specimen: Nasopharyngeal Updated: 11/03/21 1840     Specimen source Nasopharyngeal        COVID-19 rapid test Detected        Comment:      The specimen is POSITIVE for SARS-CoV-2, the novel coronavirus associated with COVID-19. This test has been authorized by the FDA under an Emergency Use Authorization (EUA) for use by authorized laboratories. Fact sheet for Healthcare Providers: [employer]  Fact sheet for Patients: [employer]       Methodology: Isothermal Nucleic Acid Amplification  CALLED TO AND CORRECTLY REPEATED BY:  Patricia TAY IN ER  [place] ON 11/03/2021 TO [facility]. Images report reviewed by me:  CT (Most Recent) (CT chest reviewed by me) Results from Hospital Encounter encounter on 11/03/21    CTA CHEST W OR W WO CONT    Narrative  EXAM: CTA chest    INDICATION: Pain. COMPARISON: November 3, 2021. TECHNIQUE: Axial CT imaging from the thoracic inlet through the diaphragm with  intravenous contrast. Coronal and sagittal MIP reformats were generated. Dose  reduction techniques used: automated exposure control, adjustment of the mAs  and/or kVp according to patient size, and iterative reconstruction techniques. Digital imaging and communications in Medicine (DICOM) format image data are  available to nonaffiliated external healthcare facilities or entities on a  secure, media free, reciprocally searchable basis with patient authorization for  at least 12 months after this study. _______________    FINDINGS:    EXAM QUALITY: Adequate. PULMONARY ARTERIES: No evidence of pulmonary embolism. MEDIASTINUM: Normal heart size. No evidence of right heart strain. Aorta is  unremarkable. No pericardial effusion. LUNGS: There are again seen numerous bilateral multilobar groundglass  infiltrates. PLEURA: There are small bilateral pleural effusions. AIRWAY: Normal.    LYMPH NODES: No enlarged nodes. UPPER ABDOMEN: Unremarkable.     OTHER: No acute or aggressive osseous abnormalities identified. _______________    Impression  1. No evidence of pulmonary embolism. 2.  Numerous bilateral multilobar groundglass infiltrates again seen. 3. Small bilateral pleural effusions. CXR reviewed by me:  XR (Most Recent). CXR  reviewed by me and compared with previous CXR Results from Hospital Encounter encounter on 11/03/21    XR CHEST PORT    Narrative  CHEST AP PORTABLE    Indication: Worsening hypoxia, Covid pneumonia. Comparison: X-ray 11/07/2021. Findings: Shallow inspiration. There is persistent bilateral diffuse reticular  and patchy alveolar opacities with lower lung zone predominance, stable to  slightly increased from comparison exam. For example left lung and disease in  right upper lung zone appears slightly more conspicuous. The cardiac silhouette  and pulmonary vascularity appear within normal limits. No evidence for  pneumothorax or pleural effusion. Impression  Bilateral diffuse disease, stable to slightly increased from comparison exam.           ·Please note: Voice-recognition software may have been used to generate this report, which may have resulted in some phonetic-based errors in grammar and contents. Even though attempts were made to correct all the mistakes, some may have been missed, and remained in the body of the document.       Jameel Cartagena MD  11/13/2021

## 2021-11-13 NOTE — PROGRESS NOTES
0705-received report from [id number] [facility] included SBAR MAR and Kardex. Shift Summary-Pt maintained on heated HFNC. Good appetite. Shook draining well.  unit in room. Bedside and Verbal shift change report given to KHADRA Gtz RN (oncoming nurse) by Sierra Barrera RN (offgoing nurse). Report included the following information SBAR, Kardex and MAR.

## 2021-11-13 NOTE — PROGRESS NOTES
[facility]  (A Division of [address] [place] [address])                                                                                                                    Yvonne Grajeda MD  Office #: - Option # [phone]  Fax #: [phone]     Date of Admission: 11/3/2021Date of Note: 11/13/2021  Reason for FU: Evaluation and antibiotic management of CPVID 19 infection  Dr Carly Farias will be available over weekend. Please call for via  or [employer] for questions/consults. I will be back on Monday, Nov 15. Thanks. Current Antimicrobials:    Prior Antimicrobials:  Dexamethasone 11/3 to date      Remdesivir 11/3 to date#5       Assessment- ID related:  --------------------------------------------------------------------------  · Acute hypoxic respiratory failure PaO2 50 on ABG:  Worse on HNC  CTA chest 11/3 and 11/7- no PE  SP Tociluzumab 11/11  · Viral PNA- Bilateral- Patchy infiltrate by CXR and B/L multilobular GGO on CT  · COVId 19 infection  S/P remdesvir  · Rhabdo 2/2 viral infection with transaminitis: Improving  · CoNS bacteremia- likely contaminant    COVId rapid + 11/3  Influenza A antigen : negative  Procal X3 NL- last 11/12  DD elevated to 7.84-> 2.28  CRP 12.7-> 14.5-> -> 2.2  Ferretin 12499-> 2953->-> 549  -> 379  IL 6 elevated 11/3  HIV 1/2- negative  Hepatitis C ab/ Hep B -negative. TB serology: Negative    Improving bio-markers while worsening hypoxia overnight, need further evaluation. Other Medical Issues- Mx per respective team:  · HTN  · Hyperlipidemia  · DM     Recommendation for ID issues I am following:  --------------------------------------------------------------------------  -cont dexamethasone   -wean off o2 as able  Monitor biomarkers  Monitor for high fevers- blood culture indicated      -> Vit complex/resp support with O2 per primary    DW with RN             Subjective:  Afebrile 98.8, WBC at 7.2K.    Biomarkers decline curve.    On HFNC- 40L -improving hypoxia  Feels ok, sitting up in bed  -No acute event overnight. RANDELL RN  Labs/notes reviewed        HPI:  Gabe Gallo is a 70 y.o. BLACK/ who is unvaccinated with PMH as listed below with limited communication due to [address] barrier. Admitted on 11/3 with hypoxia/symptoms of SOB and fever and body aches X4 days. Was hypoxic to 86% on RA-- apparently with muliplte family members sick with COVID 19 too. Found with fever, B/L lung infiltrate on CXR and CTA chest without PE, elevared D-dimer, high ferretin and CRP. Oxygen demand has been 4litres and is better down to 2 L at exam today. Currently on Remdesvir/dexamethasone. [address] from admission 1 set + for [address] [place]. Active Hospital Problems    Diagnosis Date Noted    Acute respiratory failure due to COVID-19 [facility]) 11/03/2021    Pneumonia due to COVID-19 virus 11/03/2021    HTN (hypertension) 11/03/2021    Diabetes mellitus type 2, controlled ([facility] [address] 11/03/2021     Past Medical History:   Diagnosis Date    Diabetes ([facility] [address]     HTN (hypertension)     Hyperlipidemia      History reviewed. No pertinent surgical history. Family History   Problem Relation Age of Onset    Hypertension Mother      Social History     Socioeconomic History    Marital status:      Spouse name: Not on file    Number of children: Not on file    Years of education: Not on file    Highest education level: Not on file   Occupational History    Not on file   Tobacco Use    Smoking status: Never Smoker    Smokeless tobacco: Never Used   Substance and Sexual Activity    Alcohol use:  Yes    Drug use: Never    Sexual activity: Not Currently   Other Topics Concern    Not on file   Social History Narrative    Not on file     Social Determinants of Health     Financial Resource Strain:     Difficulty of Paying Living Expenses: Not on file   Food Insecurity:     Worried About Running Out of Food in the Last Year: Not on file    Ran Out of Food in the Last Year: Not on file   Transportation Needs:     Lack of Transportation (Medical): Not on file    Lack of Transportation (Non-Medical): Not on file   Physical Activity:     Days of Exercise per Week: Not on file    Minutes of Exercise per Session: Not on file   Stress:     Feeling of Stress : Not on file   Social Connections:     Frequency of Communication with Friends and Family: Not on file    Frequency of Social Gatherings with Friends and Family: Not on file    Attends Jewish Services: Not on file    Active Member of [address] [place] [address] [employer] or Organizations: Not on file    Attends Club or Organization Meetings: Not on file    Marital Status: Not on file   Intimate Partner Violence:     Fear of Current or Ex-Partner: Not on file    Emotionally Abused: Not on file    Physically Abused: Not on file    Sexually Abused: Not on file   Housing Stability:     Unable to Pay for Housing in the Last Year: Not on file    Number of [place] in the Last Year: Not on file    Unstable Housing in the Last Year: Not on file       Allergies:  Patient has no known allergies.      Medications:  Current Facility-Administered Medications   Medication Dose Route Frequency    thiamine mononitrate (B-1) tablet 100 mg  100 mg Oral DAILY    insulin lispro (HUMALOG) injection 3 Units  3 Units SubCUTAneous TIDAC    insulin glargine (LANTUS) injection 10 Units  10 Units SubCUTAneous QHS    pantoprazole (PROTONIX) tablet 40 mg  40 mg Oral ACB    dexamethasone (DECADRON) 4 mg/mL injection 10 mg  10 mg IntraVENous Q24H    loperamide (IMODIUM) capsule 4 mg  4 mg Oral Q4H PRN    sodium chloride (NS) flush 5-10 mL  5-10 mL IntraVENous PRN    enoxaparin (LOVENOX) injection 30 mg  30 mg SubCUTAneous Q12H    acetaminophen (TYLENOL) tablet 650 mg  650 mg Oral Q6H PRN    Or    acetaminophen (TYLENOL) suppository 650 mg  650 mg Rectal Q6H PRN    guaiFENesin-dextromethorphan (ROBITUSSIN DM) 100-10 mg/5 mL syrup 5 mL  5 mL Oral Q4H PRN    cholecalciferol (VITAMIN D3) (1000 Units /25 mcg) tablet 2,000 Units  2,000 Units Oral DAILY    insulin lispro (HUMALOG) injection   SubCUTAneous AC&HS    glucose chewable tablet 16 g  4 Tablet Oral PRN    glucagon (GLUCAGEN) injection 1 mg  1 mg IntraMUSCular PRN    dextrose (D50W) injection syrg 12.5-25 g  25-50 mL IntraVENous PRN    sodium chloride (NS) flush 5-40 mL  5-40 mL IntraVENous Q8H    sodium chloride (NS) flush 5-40 mL  5-40 mL IntraVENous PRN    polyethylene glycol (MIRALAX) packet 17 g  17 g Oral DAILY PRN    ondansetron (ZOFRAN ODT) tablet 4 mg  4 mg Oral Q8H PRN    Or    ondansetron (ZOFRAN) injection 4 mg  4 mg IntraVENous Q6H PRN    zinc sulfate (ZINCATE) 50 mg zinc (220 mg) capsule 1 Capsule  1 Capsule Oral DAILY    ascorbic acid (vitamin C) (VITAMIN C) tablet 500 mg  500 mg Oral BID    melatonin (rapid dissolve) tablet 10 mg  10 mg Oral QHS    hydrALAZINE (APRESOLINE) 20 mg/mL injection 10 mg  10 mg IntraVENous Q6H PRN        Physical Exam:    Temp (24hrs), Av.8 °F (37.1 °C), Min:98.6 °F (37 °C), Max:99 °F (37.2 °C)    Visit Vitals  BP (!) 90/48   Pulse 75   Temp 98.8 °F (37.1 °C)   Resp 23   Ht 5' 3\" (1.6 m)   Wt 79.1 kg (174 lb 6.1 oz)   SpO2 97%   BMI 30.89 kg/m²          GEN: WD Pbese, on her side. On HFNC  HEENT: Unicteric. EOMI intact  CHEST: Non laboured breathing. ABD: Obese/soft. Non tender. PEPE: Deferred  EXT: No apparent swelling or redness on UE/LE joints. Skin: Dry and intact. No rash, no redness. CNS: A, OX3. Moves all extremity. CN grossly ok.       Microbiology  All Micro Results     Procedure Component Value Units Date/Time    CULTURE, BLOOD [[id number]]  (Abnormal) Collected: 21 3165    Order Status: Completed Specimen: Blood Updated: 21 1227     Special Requests: NO SPECIAL REQUESTS        GRAM STAIN       AEROBIC BOTTLE GRAM POSITIVE COCCI IN CLUSTERS                  SMEAR CALLED TO AND CORRECTLY REPEATED BY: T CHARLENE RN 3N BY CAM ON 11/4/21 AT 0839. GRAM POSITIVE COCCI IN CLUSTERS ANAEROBIC BOTTLE                  SMEAR CALLED TO AND CORRECTLY REPEATED BY: Susy Noel RN 3N AT 1500 ON 11/04/2021 TO [address]. Culture result:       STAPHYLOCOCCUS SPECIES, COAGULASE NEGATIVE MULTIPLE COLONY TYPES/STRAINS GROWING IN THE AEROBIC AND ANAEROBIC BOTTLES NO SITE INDICATED          LEGIONELLA PNEUMOPHILA AG, URINE [[id number]] Collected: 11/03/21 2045    Order Status: Canceled Specimen: Urine     STREP Kerry Eric, URINE [[id number]] Collected: 11/03/21 2045    Order Status: Canceled Specimen: Urine     CULTURE, RESPIRATORY/SPUTUM/BRONCH Tressa Mulch STAIN [[id number]] Collected: 11/03/21 2045    Order Status: Canceled Specimen: Sputum     INFLUENZA A & B AG (RAPID TEST) [[id number]] Collected: 11/03/21 1813    Order Status: Completed Specimen: Nasopharyngeal from Nasal washing Updated: 11/03/21 1843     Influenza A Antigen Negative        Comment: A negative result does not exclude influenza virus infection, seasonal or H1N1 due to suboptimal sensitivity. If influenza is circulating in your community, a diagnosis of influenza should be considered based on a patients clinical presentation and empiric antiviral treatment should be considered, if indicated. Influenza B Antigen Negative       COVID-19 RAPID TEST [[id number]]  (Abnormal) Collected: 11/03/21 1813    Order Status: Completed Specimen: Nasopharyngeal Updated: 11/03/21 1840     Specimen source Nasopharyngeal        COVID-19 rapid test Detected        Comment:      The specimen is POSITIVE for SARS-CoV-2, the novel coronavirus associated with COVID-19. This test has been authorized by the FDA under an Emergency Use Authorization (EUA) for use by authorized laboratories.         Fact sheet for Healthcare Providers: [employer]  Fact sheet for Patients: [employer]       Methodology: Isothermal Nucleic Acid Amplification  CALLED TO AND CORRECTLY REPEATED BY:  Esteban TAY IN ER  [place] ON 11/03/2021 TO [facility].                   Lines / Catheters:  Lab results:    Chemistry  Recent Labs     11/13/21  0615 11/12/21  0419 11/11/21  0446   * 189* 355*    138 134*   K 4.9 4.8 4.4    102 98*   CO2 31 32 32   BUN 19* 18 18   CREA 0.65 0.66 0.86   CA 8.3* 8.5 8.0*   AGAP 3 4 4   BUCR 29* 27* 21*   AP 95 97 112   TP 5.8* 5.8* 5.9*   ALB 2.0* 2.0* 2.0*   GLOB 3.8 3.8 3.9   AGRAT 0.5* 0.5* 0.5*       CBC w/ Diff  Recent Labs     11/13/21  0615 11/12/21  0419 11/11/21  0446   WBC 7.2 8.4 11.6   RBC 3.88* 3.93* 3.85*   HGB 10.3* 10.3* 10.4*   HCT 34.4* 34.1* 33.2*    466* 443*   GRANS 87* 87* 85*   LYMPH 8* 7* 6*   EOS 0 0 0       Imaging: report posted below as per radiologist - reports reviewed in Epic

## 2021-11-14 ENCOUNTER — APPOINTMENT (OUTPATIENT)
Dept: GENERAL RADIOLOGY | Age: 71
DRG: 177 | End: 2021-11-14
Attending: INTERNAL MEDICINE
Payer: MEDICARE

## 2021-11-14 LAB
ALBUMIN SERPL-MCNC: 2.3 G/DL (ref 3.4–5)
ALBUMIN/GLOB SERPL: 0.6 {RATIO} (ref 0.8–1.7)
ALP SERPL-CCNC: 101 U/L (ref 45–117)
ALT SERPL-CCNC: 42 U/L (ref 13–56)
ANION GAP SERPL CALC-SCNC: 3 MMOL/L (ref 3–18)
AST SERPL-CCNC: 26 U/L (ref 10–38)
BASOPHILS # BLD: 0 K/UL (ref 0–0.1)
BASOPHILS NFR BLD: 0 % (ref 0–2)
BILIRUB SERPL-MCNC: 0.6 MG/DL (ref 0.2–1)
BUN SERPL-MCNC: 18 MG/DL (ref 7–18)
BUN/CREAT SERPL: 25 (ref 12–20)
CALCIUM SERPL-MCNC: 8.9 MG/DL (ref 8.5–10.1)
CHLORIDE SERPL-SCNC: 103 MMOL/L (ref 100–111)
CO2 SERPL-SCNC: 32 MMOL/L (ref 21–32)
CREAT SERPL-MCNC: 0.71 MG/DL (ref 0.6–1.3)
CRP SERPL-MCNC: 0.7 MG/DL (ref 0–0.3)
D DIMER PPP FEU-MCNC: 1.05 UG/ML(FEU)
DIFFERENTIAL METHOD BLD: ABNORMAL
EOSINOPHIL # BLD: 0 K/UL (ref 0–0.4)
EOSINOPHIL NFR BLD: 0 % (ref 0–5)
ERYTHROCYTE [DISTWIDTH] IN BLOOD BY AUTOMATED COUNT: 13.7 % (ref 11.6–14.5)
FERRITIN SERPL-MCNC: 529 NG/ML (ref 8–388)
GLOBULIN SER CALC-MCNC: 4 G/DL (ref 2–4)
GLUCOSE BLD STRIP.AUTO-MCNC: 111 MG/DL (ref 70–110)
GLUCOSE BLD STRIP.AUTO-MCNC: 112 MG/DL (ref 70–110)
GLUCOSE BLD STRIP.AUTO-MCNC: 131 MG/DL (ref 70–110)
GLUCOSE BLD STRIP.AUTO-MCNC: 153 MG/DL (ref 70–110)
GLUCOSE BLD STRIP.AUTO-MCNC: 168 MG/DL (ref 70–110)
GLUCOSE SERPL-MCNC: 131 MG/DL (ref 74–99)
HCT VFR BLD AUTO: 37.9 % (ref 35–45)
HGB BLD-MCNC: 11 G/DL (ref 12–16)
IMM GRANULOCYTES # BLD AUTO: 0.1 K/UL (ref 0–0.04)
IMM GRANULOCYTES NFR BLD AUTO: 1 % (ref 0–0.5)
LDH SERPL L TO P-CCNC: 350 U/L (ref 81–234)
LYMPHOCYTES # BLD: 0.8 K/UL (ref 0.9–3.6)
LYMPHOCYTES NFR BLD: 9 % (ref 21–52)
MCH RBC QN AUTO: 26.1 PG (ref 24–34)
MCHC RBC AUTO-ENTMCNC: 29 G/DL (ref 31–37)
MCV RBC AUTO: 89.8 FL (ref 78–100)
MONOCYTES # BLD: 0.2 K/UL (ref 0.05–1.2)
MONOCYTES NFR BLD: 3 % (ref 3–10)
NEUTS SEG # BLD: 7 K/UL (ref 1.8–8)
NEUTS SEG NFR BLD: 87 % (ref 40–73)
NRBC # BLD: 0 K/UL (ref 0–0.01)
NRBC BLD-RTO: 0 PER 100 WBC
PLATELET # BLD AUTO: 430 K/UL (ref 135–420)
PMV BLD AUTO: 10.6 FL (ref 9.2–11.8)
POTASSIUM SERPL-SCNC: 5 MMOL/L (ref 3.5–5.5)
PROT SERPL-MCNC: 6.3 G/DL (ref 6.4–8.2)
RBC # BLD AUTO: 4.22 M/UL (ref 4.2–5.3)
SODIUM SERPL-SCNC: 138 MMOL/L (ref 136–145)
WBC # BLD AUTO: 8.1 K/UL (ref 4.6–13.2)

## 2021-11-14 PROCEDURE — 74011250636 HC RX REV CODE- 250/636: Performed by: INTERNAL MEDICINE

## 2021-11-14 PROCEDURE — 80053 COMPREHEN METABOLIC PANEL: CPT

## 2021-11-14 PROCEDURE — 74011636637 HC RX REV CODE- 636/637

## 2021-11-14 PROCEDURE — 74011636637 HC RX REV CODE- 636/637: Performed by: HOSPITALIST

## 2021-11-14 PROCEDURE — 85025 COMPLETE CBC W/AUTO DIFF WBC: CPT

## 2021-11-14 PROCEDURE — 82962 GLUCOSE BLOOD TEST: CPT

## 2021-11-14 PROCEDURE — 71045 X-RAY EXAM CHEST 1 VIEW: CPT

## 2021-11-14 PROCEDURE — 82728 ASSAY OF FERRITIN: CPT

## 2021-11-14 PROCEDURE — 86140 C-REACTIVE PROTEIN: CPT

## 2021-11-14 PROCEDURE — 85379 FIBRIN DEGRADATION QUANT: CPT

## 2021-11-14 PROCEDURE — 65660000000 HC RM CCU STEPDOWN

## 2021-11-14 PROCEDURE — 83615 LACTATE (LD) (LDH) ENZYME: CPT

## 2021-11-14 PROCEDURE — 74011250637 HC RX REV CODE- 250/637: Performed by: INTERNAL MEDICINE

## 2021-11-14 PROCEDURE — 74011250636 HC RX REV CODE- 250/636: Performed by: HOSPITALIST

## 2021-11-14 PROCEDURE — 74011636637 HC RX REV CODE- 636/637: Performed by: INTERNAL MEDICINE

## 2021-11-14 PROCEDURE — 77010033678 HC OXYGEN DAILY

## 2021-11-14 PROCEDURE — 36415 COLL VENOUS BLD VENIPUNCTURE: CPT

## 2021-11-14 RX ADMIN — INSULIN GLARGINE 10 UNITS: 100 INJECTION, SOLUTION SUBCUTANEOUS at 22:47

## 2021-11-14 RX ADMIN — INSULIN LISPRO 3 UNITS: 100 INJECTION, SOLUTION INTRAVENOUS; SUBCUTANEOUS at 07:30

## 2021-11-14 RX ADMIN — INSULIN LISPRO 2 UNITS: 100 INJECTION, SOLUTION INTRAVENOUS; SUBCUTANEOUS at 22:59

## 2021-11-14 RX ADMIN — THIAMINE HCL TAB 100 MG 100 MG: 100 TAB at 09:00

## 2021-11-14 RX ADMIN — Medication 10 MG: at 22:44

## 2021-11-14 RX ADMIN — PANTOPRAZOLE SODIUM 40 MG: 40 TABLET, DELAYED RELEASE ORAL at 07:30

## 2021-11-14 RX ADMIN — Medication 10 ML: at 05:40

## 2021-11-14 RX ADMIN — Medication 500 MG: at 22:44

## 2021-11-14 RX ADMIN — ENOXAPARIN SODIUM 30 MG: 30 INJECTION SUBCUTANEOUS at 08:40

## 2021-11-14 RX ADMIN — Medication 2000 UNITS: at 09:00

## 2021-11-14 RX ADMIN — ENOXAPARIN SODIUM 30 MG: 30 INJECTION SUBCUTANEOUS at 22:44

## 2021-11-14 RX ADMIN — Medication 10 ML: at 13:16

## 2021-11-14 RX ADMIN — DEXAMETHASONE SODIUM PHOSPHATE 10 MG: 4 INJECTION, SOLUTION INTRAMUSCULAR; INTRAVENOUS at 22:46

## 2021-11-14 RX ADMIN — INSULIN LISPRO 3 UNITS: 100 INJECTION, SOLUTION INTRAVENOUS; SUBCUTANEOUS at 16:30

## 2021-11-14 RX ADMIN — INSULIN LISPRO 3 UNITS: 100 INJECTION, SOLUTION INTRAVENOUS; SUBCUTANEOUS at 13:22

## 2021-11-14 RX ADMIN — Medication 10 ML: at 22:48

## 2021-11-14 RX ADMIN — INSULIN LISPRO 2 UNITS: 100 INJECTION, SOLUTION INTRAVENOUS; SUBCUTANEOUS at 07:30

## 2021-11-14 RX ADMIN — Medication 500 MG: at 09:00

## 2021-11-14 RX ADMIN — ZINC SULFATE 220 MG (50 MG) CAPSULE 1 CAPSULE: CAPSULE at 09:00

## 2021-11-14 NOTE — PROGRESS NOTES
Pulmonary Specialists  Pulmonary, Critical Care, and Sleep Medicine    Name: Doneta Dubin MRN: [id number]   : 1950 Hospital: [facility] [place]    Date: 2021  Room: [facility]/[address] [place] [address] Note                                              Consult requesting physician: Dr. Jack Morton  Reason for Consult: Acute hypoxic respiratory failure    IMPRESSION:   ·   Patient Active Problem List   Diagnosis Code    Acute respiratory failure due to COVID-19 ([facility] [address] U07.1, J96.00    Pneumonia due to COVID-19 virus U07.1, J12.82    HTN (hypertension) I10    Diabetes mellitus type 2, controlled ([address] E11.9 ·   Anemia  · Code status: Full code   RECOMMENDATIONS:   Respiratory: Acute hypoxic respiratory failure due to diffuse severe COVID-19 pneumonia  Improvement in flow and stable FiO2 need today from yesterday  FiO2: 40%, flow 35 L/min  Xr chest for follow up in AM - stable  HFNC vs PAP vs early intubation: Avoid CPAP/BiPAP. Data suggest against early intubation with SPO2 goal > 85% acceptable. C/w HFNC. Patient made aware of using high O2 can lead to oxygen toxicity and its consequences including pulmonary fibrosis. Proning: early prone positioning recommended. Patient not adherent to proning  If intubated, avoid early high PEEP if possible. Inhaled NO and prostacyclin may be beneficial, but not available at THE [facility]. Keep SPO2 >= 85%. HOB 30 degree elevation all the time. Aggressive pulmonary toileting. Aspiration precautions. Incentive spirometry when not intubated. CVS: Hemodynamically stable  Cardiac enzymes and pro BNP normal  Anticoagulation: Lovenox 30 mg every 12. D-Dimer stable to improving    PVL BL LE 21  · No evidence of deep vein thrombosis in the right lower extremity. · No evidence of deep vein thrombosis in the left lower extremity    ID: ID following  Severe COVID-19 pneumonia with hypoxic respiratory failure.   COVID-19 vaccine status: not taken  COVID-19 test: 11/3/21  Not candidate for antibodies such as Bamlanivimab, Regeneron or Casirivimib plus Imdevimab   Remdesivir: finished 5 days from 11/3/21  Dexamethasone: 10 mg daily - taper as per clinical course  Actemra: 11/11/21  Monitor for cytokine surge  Difficulty enrolling in research trial.  Vit C, Vit D3, Thiamine 100 mg daily, Zinc sulfate 1 cap daily, Melatonin 5 mg Qhs. Avoid NSAID. Use tylenol for fever and pain. Rapid influenza test: Negative. Blood cx 11/3/21: Staph Coag negative - likely contamination    Viral hepatitis panel: negative   HIV: negative   QuantiFERON-TB gold: negative  Daily CBC with diff, CMP, d-dimer, LDH, CRP. IL-6 (send out test and may not result timely) per clinical course    Hematology/Oncology: Monitor daily CBC, D-Dimer  Anticoagulation Lovenox 30 mg Q12   Renal: normal renal function  Hypovolemia due to fever and hyperventilation; and renal microthrombi which could occur in COVID19 infection; leading to renal failure and need for CRRT (which is not available at THE [facility]) or HD. Avoid hypervolemia. Gentle hydration as needed. Monitor volume status, UOP and renal functions closely. GI/: Monitor LFT daily. Endocrine: Monitor FSBS. SSI as needed  Neurology: AAOx 3, non-focal exam  Toxicology: none  Pain/Sedation: none  Skin/Wound: none  Electrolytes: Replace electrolytes per ICU electrolyte replacement protocol. Nutrition: diet  Prophylaxis: DVT Prophylaxis: SCD/Lovenox. GI Prophylaxis: Protonix. Restraints: none  Lines/Tubes: PIV  Advance Directive/Palliative Care: full code, palliative care consulted per clinical course    Transfer to floor when okay with hospitalist  Will defer respective systems problem management to primary and other respective consultant and follow patient while in ICU with primary and other medical team.  Further recommendations will be based on the patient's response to recommended treatment and results of the investigation ordered.   Quality Care: PPI, DVT prophylaxis, HOB elevated, Infection control all reviewed and addressed. Care of plan d/w RN, RT, hospitalist team, ID  D/w patient (answered all questions to satisfaction). High complexity decision making was performed during the evaluation of this patient at high risk for decompensation with multiple organ involvement. Total critical care time spent rendering care exclusive of procedures/family discussion/coordination of care: 36 minutes. NOTE:  Positive COVID19 infection (Coronavirus), unknown disease course and outcome, consequences including but not limited to respiratory failure requiring ventilator use, cardiac failure and multiorgan failure leading to death, limited investigational treatment based on limited data which may or may not help and can be harmful. [id number] patients on ventilator support are being reported to have a poor prognosis with severe hypoxemia and/or viral cardiomyopathy related cardiac arrest, and high mortality compared to illness. The clinical spectrum seems to fluctuate in severity between patients, variant and also the clinical spectrum changes in the same patient. Medications such as Tocilizumab, Bamlanivimab, Regeneron or Casirivimib plus Imdevimab, Baricitinib plus Ramdesivir, anti-viral medications, plasma and other are investigational, and mostly EUA by FDA recommended, some have fallen out of favor due to lack of clinical benefit. Difficulty to enroll in clinical trials as research centers may not be located locally or easily accessible. Not all medications are easily available and response may vary from variants, patient to patient. These meds are being used based on experience from SARS-CoV infection in past experiences from [facility] [facility], [place], [facility] and other world Covid 19 cases. Certain management strategies are based on anecdotal evidence seen in COVID-19 patients.  Since it is a recent pandemic, multiple variants and evolving, many trials, studies and case reports are being reported early, and no specific consensus guidelines from big societies are evident. Patient discussion:  Patient is educated about COVID19 infection, spread of infection, unknown disease course and outcome, multiple variants, consequences including but not limited to respiratory failure requiring ventilator use, cardiac failure and multiorgan failure leading to death, limited investigational treatment based on limited data which may or may not help and can be harmful. She verbalized understanding and agreed to try investigational therapy with unknown outcome. The management strategies including medications and ventilator support are based on recommendations from center such as [address] [place], [place] [address]vince [place], [facility], [place] [address], simin in COVID-19 patients, and medications and ventilator management strategies are evolving based on new data. Anticoagulation seems to be current standard of care for people with hypoxemic respiratory failure with COVID pneumonia. Dexamethasone (Recovery trial-[place]) or Solu-Medrol being recommended for treatment of COVID pneumonia needing oxygen supplementation. Proning is recommended for patients with hypoxemic respiratory failure. This provided treatment/management may cover potential off-label and compassionate use of medications and/or immunosuppressant for COVID19 positive patient in the context of a public health emergency related to [address] (a.k.a the Coronavirus) and in connection with the state of emergency declared by the [address] [place] [address]. Given emerging nature of this infection, further treatment could be underway simultaneously and may not be available to the provider. Some of the services which may be beneficial but are not available at THE [facility] facility (like inhaled nitric oxide, CRRT, ECMO). Some of the labs are not available and send out and may not be resulted on timely manner.  Recommended management may not guarantee outcome nor substitute for the practice of medicine for any medical society, clinical evidence based professional advice, work up or treatments and meant to adopt to each specific patient based on the current condition, presentation, consideration of the patient's needs, the resources available at the location from where the medical professional services are being provided and any other unique circumstances. The CDC federal and state guidelines have emphasized repeatedly the importance of vaccination to prevent severe infection, mortality, disabilities from [id number] with several SARS-CoV2 strains currently in the [place]. The treatment protocols are followed based on local hospital protocols, with guidance from centers such as [facility] protocols, [facility], other. The local hospital protocols have been guided by THE [facility] pharmacy department. THE [facility] is not part of any research protocol. Several trials with different medications are ongoing in the [place] and rest of the world, with changes being recommended. These changes are just recommendations, and may not be available at local facilities, and may not be approved by FDA for the [facility]. The ICU team are in constant communication with hospital pharmacy and administration, ID to provide best possible management for COVID 19 pneumonia patients in this hospital. This is discussed with the patient who verbalized understanding. NOTE  UPTODATE- REMDESIVIR  Dosing: Hepatic Impairment: Adult  Baseline hepatic impairment: There are no dosage adjustments provided (has not been studied); not recommended to be used in patients with baseline ALT ? 5 times the ULN (FDA 2020a). Hepatoxicity during therapy:  ALT ? 5 times the ULN: Discontinue remdesivir; may resume when ALT is <5 times the ULN (FDA 2020a). ALT elevation AND signs or symptoms of liver inflammation or increasing conjugated bilirubin, alkaline phosphatase, or INR: Discontinue remdesivir (FDA 2020a).     Upper limit of transaminases variable, and clinical context needed; first do no harm with medicationsespecially if the medications have not been established to be conclusively beneficial; consider risks of side effects versus potential benefits with medications such as remdesivir, tocilizumab in the setting of acute hepatitis. Remdesivir has been recently approved by FDA, but recent WHO trials failed to show mortality benefit. These meds are being used based on experience from [address] [place] [address] and global treatment protocols from various institutions. Positive COVID19 infection (Coronavirus), unknown disease course and outcome, multiple variants, consequences including but not limited to respiratory failure requiring ventilator use, cardiac failure and multiorgan failure leading to death, limited investigational treatment based on limited data which may or may not help, and may not change the prognosis. [id number] patients on ventilator support are being reported to have a poor prognosis with severe hypoxemia and/or viral cardiomyopathy related cardiac arrest, and high mortality compared to influenza illness. The clinical spectrum seems to fluctuate in severity between patients, variant to variant and also the clinical spectrum changes in the same patient. Tocilizumab, Bamlanivimab, Regeneron or Casirivimib plus Imdevimab, Baricitinib plus Ramdesivir, anti-viral medications, plasma and other are investigational, and mostly EUA by FDA recommended, some have fallen out of favor due to lack of clinical benefit. Difficulty to enroll in clinical trials as research centers may not be located locally or easily accessible. Subjective/History of Present Illness:   Patient is a 70 y.o. female with PMHx significant for HTN seen at the request of Dr. Judge Arzate to assist during ICU care for acute hypoxic respiratory failure with Covid19 pneumonia.  help provided through hospital used during this evaluation.   She is unvaccinated female admitted on 11/3/2021 with progressive shortness of breath, fever, body ache for at least 4 days prior to admission. In ER, noted to be hypoxic requiring supplemental oxygen and further testing suggested COVID-19 infection, multilobar pneumonia without PE on CTA chest.  Patient denies any family member with COVID-19 infection while historically multiple family members were diagnosed with COVID-19 infection per staff. She received remdesivir, steroid and anticoagulation prophylaxis. Her hospital course is significant for progressive increasing need of supplemental oxygen to NC when hospitalist transferred the patient to the ICU. Patient seen at bedside in room #353. She feels no worsening in her dyspnea compared to admission despite increased need for O2. The patient reports wet cough denies phlegm, chest pain, wheezing or hemoptysis, diarrhea, nausea, vomiting, change in the taste or smell. She has received today Actemra. Travel Hx: none  Exposure to suspected COVID19 patient: unknown  COVID19 vaccination: not taken    : ID# [id number]    I/O last 24 hrs: Intake/Output Summary (Last 24 hours) at 11/14/2021 1323  Last data filed at 11/14/2021 1249  Gross per 24 hour   Intake 140 ml   Output 3000 ml   Net -2860 ml       11/14/21   Patient seen at bedside in ICU room #110 under proper PPE  Used hospital provided interpreting service during the bedside visit  Further improvement in flow to 35 L and stable FiO2 at 40%; SPO2 % on monitor  Patient not doing proning  Stable chest congestion, cough  Feels minimal dyspnea with any activity; denies CP, wheezing, hemoptysis, fever, chills  Hemodynamically stable  Good oral intake and voiding urine  I was not contacted by staff on anything about patient overnight.        Review of Systems:  General ROS: negative for - fever, chills, weight loss  Cardiovascular ROS: negative for - chest pain, edema, murmur, orthopnea, palpitations or pnd  Gastrointestinal ROS: no nausea, vomiting, abdominal pain, change in bowel habits  Dermatological ROS: negative for - pruritus, rash or skin lesion changes       No Known Allergies     Past Medical History:   Diagnosis Date    Diabetes ([address]     HTN (hypertension)     Hyperlipidemia       History reviewed. No pertinent surgical history.      Social History     Tobacco Use    Smoking status: Never Smoker    Smokeless tobacco: Never Used   Substance Use Topics    Alcohol use: Yes      Family History   Problem Relation Age of Onset    Hypertension Mother       Prior to Admission medications    Not on File     Current Facility-Administered Medications   Medication Dose Route Frequency    thiamine mononitrate (B-1) tablet 100 mg  100 mg Oral DAILY    insulin lispro (HUMALOG) injection 3 Units  3 Units SubCUTAneous TIDAC    insulin glargine (LANTUS) injection 10 Units  10 Units SubCUTAneous QHS    pantoprazole (PROTONIX) tablet 40 mg  40 mg Oral ACB    dexamethasone (DECADRON) 4 mg/mL injection 10 mg  10 mg IntraVENous Q24H    enoxaparin (LOVENOX) injection 30 mg  30 mg SubCUTAneous Q12H    cholecalciferol (VITAMIN D3) (1000 Units /25 mcg) tablet 2,000 Units  2,000 Units Oral DAILY    insulin lispro (HUMALOG) injection   SubCUTAneous AC&HS    sodium chloride (NS) flush 5-40 mL  5-40 mL IntraVENous Q8H    zinc sulfate (ZINCATE) 50 mg zinc (220 mg) capsule 1 Capsule  1 Capsule Oral DAILY    ascorbic acid (vitamin C) (VITAMIN C) tablet 500 mg  500 mg Oral BID    melatonin (rapid dissolve) tablet 10 mg  10 mg Oral QHS         Objective:   Vital Signs:    Visit Vitals  BP (!) 120/91   Pulse 81   Temp 98.8 °F (37.1 °C)   Resp 20   Ht 5' 3\" (1.6 m)   Wt 79.1 kg (174 lb 6.1 oz)   SpO2 99%   BMI 30.89 kg/m²       O2 Device: Heated, Hi flow nasal cannula   O2 Flow Rate (L/min): 40 l/min   Temp (24hrs), Av °F (37.2 °C), Min:98.8 °F (37.1 °C), Max:99.1 °F (37.3 °C)       Intake/Output:   Last shift: 11/14 0701 - 11/14 1900  In: -   Out: 900 [Urine:900]    Last 3 shifts: 11/12 1901 - 11/14 0700  In: 140 [P.O.:120; I.V.:20]  Out: 2450 [Urine:2450]      Intake/Output Summary (Last 24 hours) at 11/14/2021 1323  Last data filed at 11/14/2021 1249  Gross per 24 hour   Intake 140 ml   Output 3000 ml   Net -2860 ml       Last 3 Recorded Weights in this Encounter    11/08/21 0422 11/10/21 0723 11/11/21 1600   Weight: 80.6 kg (177 lb 11.1 oz) 78.7 kg (173 lb 8 oz) 79.1 kg (174 lb 6.1 oz)         No results for input(s): PHI, PHI, POC2, PCO2I, PO2, PO2I, HCO3, HCO3I, FIO2, FIO2I in the last 72 hours.     Physical Exam: Limitations in exam due to covid 19 isolation and full PPE requirement  General: AAO x3, in no respiratory distress, on HFNC  HEENT: PERRLA, mucosa normal without erythema or exudate  Neck: No abnormally enlarged lymph nodes or thyroid, supple  Chest: Obese chest wall  Lungs: moderate air entry, rhonchi scattered and no wheezes bilaterally, no tenderness/ rash  Heart: Regular rate and rhythm, S1S2 present or without murmur or extra heart sounds  Abdomen: obese, bowel sounds normoactive, tympanic, abdomen is soft without significant tenderness, masses, organomegaly or guarding, rigidity, rebound  Extremity: no edema, cyanosis  Neuro: alert, oriented x 3, no defects noted in limited exam.  Skin: Skin color, texture, turgor unchanged      Data:       Recent Results (from the past 24 hour(s))   GLUCOSE, POC    Collection Time: 11/13/21  4:36 PM   Result Value Ref Range    Glucose (POC) 102 70 - 110 mg/dL   GLUCOSE, POC    Collection Time: 11/13/21  9:04 PM   Result Value Ref Range    Glucose (POC) 168 (H) 70 - 110 mg/dL   GLUCOSE, POC    Collection Time: 11/14/21  5:59 AM   Result Value Ref Range    Glucose (POC) 153 (H) 70 - 110 mg/dL   GLUCOSE, POC    Collection Time: 11/14/21  7:46 AM   Result Value Ref Range    Glucose (POC) 131 (H) 70 - 110 mg/dL   D DIMER    Collection Time: 11/14/21  8:30 AM   Result Value Ref Range    D DIMER 1.05 (H) <0.46 ug/ml(FEU)   CBC WITH AUTOMATED DIFF    Collection Time: 11/14/21  8:30 AM   Result Value Ref Range    WBC 8.1 4.6 - 13.2 K/uL    RBC 4.22 4.20 - 5.30 M/uL    HGB 11.0 (L) 12.0 - 16.0 g/dL    HCT 37.9 35.0 - 45.0 %    MCV 89.8 78.0 - 100.0 FL    MCH 26.1 24.0 - 34.0 PG    MCHC 29.0 (L) 31.0 - 37.0 g/dL    RDW 13.7 11.6 - 14.5 %    PLATELET 237 (H) 399 - 420 K/uL    MPV 10.6 9.2 - 11.8 FL    NRBC 0.0 0  WBC    ABSOLUTE NRBC 0.00 0.00 - 0.01 K/uL    NEUTROPHILS 87 (H) 40 - 73 %    LYMPHOCYTES 9 (L) 21 - 52 %    MONOCYTES 3 3 - 10 %    EOSINOPHILS 0 0 - 5 %    BASOPHILS 0 0 - 2 %    IMMATURE GRANULOCYTES 1 (H) 0.0 - 0.5 %    ABS. NEUTROPHILS 7.0 1.8 - 8.0 K/UL    ABS. LYMPHOCYTES 0.8 (L) 0.9 - 3.6 K/UL    ABS. MONOCYTES 0.2 0.05 - 1.2 K/UL    ABS. EOSINOPHILS 0.0 0.0 - 0.4 K/UL    ABS. BASOPHILS 0.0 0.0 - 0.1 K/UL    ABS. IMM. GRANS. 0.1 (H) 0.00 - 0.04 K/UL    DF AUTOMATED     METABOLIC PANEL, COMPREHENSIVE    Collection Time: 11/14/21  8:30 AM   Result Value Ref Range    Sodium 138 136 - 145 mmol/L    Potassium 5.0 3.5 - 5.5 mmol/L    Chloride 103 100 - 111 mmol/L    CO2 32 21 - 32 mmol/L    Anion gap 3 3.0 - 18 mmol/L    Glucose 131 (H) 74 - 99 mg/dL    BUN 18 7.0 - 18 MG/DL    Creatinine 0.71 0.6 - 1.3 MG/DL    BUN/Creatinine ratio 25 (H) 12 - 20      GFR est AA >60 >60 ml/min/1.73m2    GFR est non-AA >60 >60 ml/min/1.73m2    Calcium 8.9 8.5 - 10.1 MG/DL    Bilirubin, total 0.6 0.2 - 1.0 MG/DL    ALT (SGPT) 42 13 - 56 U/L    AST (SGOT) 26 10 - 38 U/L    Alk.  phosphatase 101 45 - 117 U/L    Protein, total 6.3 (L) 6.4 - 8.2 g/dL    Albumin 2.3 (L) 3.4 - 5.0 g/dL    Globulin 4.0 2.0 - 4.0 g/dL    A-G Ratio 0.6 (L) 0.8 - 1.7     GLUCOSE, POC    Collection Time: 11/14/21 12:00 PM   Result Value Ref Range    Glucose (POC) 111 (H) 70 - 110 mg/dL         Chemistry Recent Labs     11/14/21  0830 11/13/21  0615 11/12/21  0419   * 171* 189*    138 138   K 5.0 4.9 4.8    104 102   CO2 32 31 32   BUN 18 19* 18   CREA 0.71 0.65 0.66   CA 8.9 8.3* 8.5   AGAP 3 3 4   BUCR 25* 29* 27*    95 97   TP 6.3* 5.8* 5.8*   ALB 2.3* 2.0* 2.0*   GLOB 4.0 3.8 3.8   AGRAT 0.6* 0.5* 0.5*        Lactic Acid No results found for: LAC  No results for input(s): LAC in the last 72 hours. Liver Enzymes Protein, total   Date Value Ref Range Status   11/14/2021 6.3 (L) 6.4 - 8.2 g/dL Final     Albumin   Date Value Ref Range Status   11/14/2021 2.3 (L) 3.4 - 5.0 g/dL Final     Globulin   Date Value Ref Range Status   11/14/2021 4.0 2.0 - 4.0 g/dL Final     A-G Ratio   Date Value Ref Range Status   11/14/2021 0.6 (L) 0.8 - 1.7   Final     Alk. phosphatase   Date Value Ref Range Status   11/14/2021 101 45 - 117 U/L Final     Recent Labs     11/14/21  0830 11/13/21  0615 11/12/21  0419   TP 6.3* 5.8* 5.8*   ALB 2.3* 2.0* 2.0*   GLOB 4.0 3.8 3.8   AGRAT 0.6* 0.5* 0.5*    95 97        CBC w/Diff Recent Labs     11/14/21  0830 11/13/21  0615 11/12/21  0419   WBC 8.1 7.2 8.4   RBC 4.22 3.88* 3.93*   HGB 11.0* 10.3* 10.3*   HCT 37.9 34.4* 34.1*   * 411 466*   GRANS 87* 87* 87*   LYMPH 9* 8* 7*   EOS 0 0 0        Cardiac Enzymes No results found for: CPK, CK, CKMMB, CKMB, RCK3, CKMBT, CKNDX, CKND1, ROSINA, TROPT, TROIQ, MEDHAT, TROPT, TNIPOC, BNP, BNPP     BNP No results found for: BNP, BNPP, XBNPT     Coagulation No results for input(s): PTP, INR, APTT, INREXT, INREXT in the last 72 hours. Thyroid  No results found for: T4, T3U, TSH, TSHEXT, TSHEXT    No results found for: T4     Urinalysis No results found for: COLOR, APPRN, SPGRU, REFSG, [place], PROTU, GLUCU, KETU, BILU, UROU, STAR, LEUKU, GLUKE, EPSU, BACTU, WBCU, RBCU, CASTS, UCRY     Micro  No results for input(s): SDES, CULT in the last 72 hours. No results for input(s): CULT in the last 72 hours. Culture data during this hospitalization.    All Micro Results     Procedure Component Value Units Date/Time    CULTURE, BLOOD [[id number]]  (Abnormal) Collected: 11/03/21 1746    Order Status: Completed Specimen: Blood Updated: 11/05/21 1227     Special Requests: NO SPECIAL REQUESTS        GRAM STAIN       AEROBIC BOTTLE GRAM POSITIVE COCCI IN CLUSTERS                  SMEAR CALLED TO AND CORRECTLY REPEATED BY: Beto Leary RN 3N BY CAM ON 11/4/21 AT 1233 [address] GRAM POSITIVE COCCI IN CLUSTERS ANAEROBIC BOTTLE                  SMEAR CALLED TO AND CORRECTLY REPEATED BY: Susan Wilder RN 3N AT 1500 ON 11/04/2021 TO [facility]. Culture result:       STAPHYLOCOCCUS SPECIES, COAGULASE NEGATIVE MULTIPLE COLONY TYPES/STRAINS GROWING IN THE AEROBIC AND ANAEROBIC BOTTLES NO SITE INDICATED          LEGIONELLA PNEUMOPHILA AG, URINE [[id number]] Collected: 11/03/21 2045    Order Status: Canceled Specimen: Urine     STREP Filley Canavan, URINE [[id number]] Collected: 11/03/21 2045    Order Status: Canceled Specimen: Urine     CULTURE, RESPIRATORY/SPUTUM/BRONCH Carole Alosa STAIN [[id number]] Collected: 11/03/21 2045    Order Status: Canceled Specimen: Sputum     INFLUENZA A & B AG (RAPID TEST) [[id number]] Collected: 11/03/21 1813    Order Status: Completed Specimen: Nasopharyngeal from Nasal washing Updated: 11/03/21 1843     Influenza A Antigen Negative        Comment: A negative result does not exclude influenza virus infection, seasonal or H1N1 due to suboptimal sensitivity. If influenza is circulating in your community, a diagnosis of influenza should be considered based on a patients clinical presentation and empiric antiviral treatment should be considered, if indicated. Influenza B Antigen Negative       COVID-19 RAPID TEST [[id number]]  (Abnormal) Collected: 11/03/21 1813    Order Status: Completed Specimen: Nasopharyngeal Updated: 11/03/21 1840     Specimen source Nasopharyngeal        COVID-19 rapid test Detected        Comment:      The specimen is POSITIVE for SARS-CoV-2, the novel coronavirus associated with COVID-19.        This test has been authorized by the FDA under an Emergency Use Authorization (EUA) for use by authorized laboratories. Fact sheet for Healthcare Providers: ConventionUpdate.co.nz  Fact sheet for Patients: ConventionUpdate.co.nz       Methodology: Isothermal Nucleic Acid Amplification  CALLED TO AND CORRECTLY REPEATED BY:  Nara TAY IN ER  [place] ON 11/03/2021 TO [facility]. Images report reviewed by me:  CT (Most Recent) (CT chest reviewed by me) Results from Hospital Encounter encounter on 11/03/21    CTA CHEST W OR W WO CONT    Narrative  EXAM: CTA chest    INDICATION: Pain. COMPARISON: November 3, 2021. TECHNIQUE: Axial CT imaging from the thoracic inlet through the diaphragm with  intravenous contrast. Coronal and sagittal MIP reformats were generated. Dose  reduction techniques used: automated exposure control, adjustment of the mAs  and/or kVp according to patient size, and iterative reconstruction techniques. Digital imaging and communications in Medicine (DICOM) format image data are  available to nonaffiliated external healthcare facilities or entities on a  secure, media free, reciprocally searchable basis with patient authorization for  at least 12 months after this study. _______________    FINDINGS:    EXAM QUALITY: Adequate. PULMONARY ARTERIES: No evidence of pulmonary embolism. MEDIASTINUM: Normal heart size. No evidence of right heart strain. Aorta is  unremarkable. No pericardial effusion. LUNGS: There are again seen numerous bilateral multilobar groundglass  infiltrates. PLEURA: There are small bilateral pleural effusions. AIRWAY: Normal.    LYMPH NODES: No enlarged nodes. UPPER ABDOMEN: Unremarkable. OTHER: No acute or aggressive osseous abnormalities identified. _______________    Impression  1. No evidence of pulmonary embolism. 2.  Numerous bilateral multilobar groundglass infiltrates again seen.     3. Small bilateral pleural effusions. CXR reviewed by me:  XR (Most Recent). CXR  reviewed by me and compared with previous CXR Results from Hospital Encounter encounter on 11/03/21    XR CHEST PORT    Narrative  EXAM: Chest radiograph    CLINICAL INDICATION/HISTORY: COVID Pneumonia  --Additional history: None. COMPARISON: 11/11/2021    TECHNIQUE: Frontal view of the chest    _______________    FINDINGS:    SUPPORT DEVICES: None. HEART AND MEDIASTINUM: Cardiac silhouette is normal in size. Normal mediastinal  contours . Normal pulmonary vasculature. LUNGS AND PLEURAL SPACES: Allowing for differences in technique, no significant  interval change in the patchy bilateral pulmonary opacities. Sharp costophrenic  sulci. No pneumothoraces. BONY THORAX AND SOFT TISSUES: Unremarkable.    _______________    Impression  No significant interval change in the patchy bilateral airspace  disease in keeping with the patient's provided history of COVID pneumonia. ·Please note: Voice-recognition software may have been used to generate this report, which may have resulted in some phonetic-based errors in grammar and contents. Even though attempts were made to correct all the mistakes, some may have been missed, and remained in the body of the document.       Shadi Ponce MD  11/14/2021

## 2021-11-14 NOTE — PROGRESS NOTES
RESPIRATORY NOTE:       Trial off HFNC, initiated 6 LPM nasal cannula, comfortable, tolerating well at this time, will monitor.

## 2021-11-14 NOTE — PROGRESS NOTES
RESPIRATORY NOTE:       Pt remains comfortable on HFNC titrated to 35 LPM, approximately 35-40% FIO2, per MD, will continue to titrate as tolerated

## 2021-11-14 NOTE — PROGRESS NOTES
Called pt's son-in-law Mr. Paul Haque at her request at listed number  Update provided.  Answered all questions to his satisfaction  Will provide update as time and involvement     Germaine Gimenez MD

## 2021-11-14 NOTE — PROGRESS NOTES
Assumed pt care approx 0730. Assessments performed, see flowsheet. Pt assessed w/, a&o x4. Pt denies pain and states improved breathing. No distress noted, pt denies any needs prior to leaving room. Pt reassessed, no changes noted. Vss. No needs at this time voiced. Pt reassessed w/, no changes or distress noted. Vss, pt denies any needs at this time. Vss, no distress noted. Pt denies any needs at this time.

## 2021-11-14 NOTE — PROGRESS NOTES
Problem: Risk for Spread of Infection  Goal: Prevent transmission of infectious organism to others  Description: Prevent the transmission of infectious organisms to other patients, staff members, and visitors. Outcome: Progressing Towards Goal     Problem: Patient Education:  Go to Education Activity  Goal: Patient/Family Education  Outcome: Progressing Towards Goal     Problem: Airway Clearance - Ineffective  Goal: Achieve or maintain patent airway  Outcome: Progressing Towards Goal     Problem: Gas Exchange - Impaired  Goal: Absence of hypoxia  Outcome: Progressing Towards Goal  Goal: Promote optimal lung function  Outcome: Progressing Towards Goal     Problem: Body Temperature -  Risk of, Imbalanced  Goal: Ability to maintain a body temperature within defined limits  Outcome: Progressing Towards Goal  Goal: Will regain or maintain usual level of consciousness  Outcome: Progressing Towards Goal  Goal: Complications related to the disease process, condition or treatment will be avoided or minimized  Outcome: Progressing Towards Goal     Problem: Falls - Risk of  Goal: *Absence of Falls  Description: Document Danney Mess Fall Risk and appropriate interventions in the flowsheet.   Outcome: Progressing Towards Goal  Note: Fall Risk Interventions:  Mobility Interventions: Assess mobility with egress test, Bed/chair exit alarm, Patient to call before getting OOB         Medication Interventions: Bed/chair exit alarm, Patient to call before getting OOB    Elimination Interventions: Bed/chair exit alarm, Call light in reach

## 2021-11-14 NOTE — PROGRESS NOTES
Hospitalist Progress Note    Patient: Nina Bartlett MRN: [id number]  [facility]: [id number]    YOB: 1950  Age: 70 y.o. Sex: female    DOA: 11/3/2021 LOS:  LOS: 11 days            Patient Active Problem List   Diagnosis Code    Acute respiratory failure due to COVID-19 ([address] U07.1, J96.00    Pneumonia due to COVID-19 virus U07.1, J12.82    HTN (hypertension) I10    Diabetes mellitus type 2, controlled ([address] E11.9        IMPRESSION and Plan:    Nina Bartlett is a 70 y.o. female with   Patient Active Problem List    Diagnosis Date Noted    Acute respiratory failure due to COVID-19 ([address] 11/03/2021    Pneumonia due to COVID-19 virus 11/03/2021    HTN (hypertension) 11/03/2021    Diabetes mellitus type 2, controlled ([address] 11/03/2021     Principal Problem:    Pneumonia due to COVID-19 virus (11/3/2021)    Active Problems:    Acute respiratory failure due to COVID-19 ([address] (11/3/2021)      HTN (hypertension) (11/3/2021)      Diabetes mellitus type 2, controlled ([address] (11/3/2021)               acute respiratory failure with hypoxia   need high flow 40 L , 90 % oxyg, abg hypoxia   Continue supportive treatment      Pneumonia due to Covid with hypoxia,  Continue iv deca drone, ID f/u -received Tociluzuma   Completed remdesivir  Continue supportive treatment   duplex of lower extremity neg for DVT  Ferritin trending down     Hyperglycemia/diabetes:   Continue lantus 10  SSI PRN add premeal insulin     Hypertension  Continue hydralazine as needed     Mild increase in liver enzymes,   Due to viral infection   lft wnl          Hypokalemia-resolved      Patient's condition is fair      ovrall improved. Okay to transfer out of ICU         Recommend to continue hospitalization. Discussed with patient. Chief Complaints:   Chief Complaint   Patient presents with    Shortness of Breath     SUBJECTIVE:  Pt is seen and examined. No CP or SOB  No Fever, chills, Nausea, vomitting.            Review of systems:    Review of Systems   Constitutional: Positive for malaise/fatigue. HENT: Negative. Eyes: Negative. Respiratory: Positive for shortness of breath. Cardiovascular: Negative for chest pain, palpitations, orthopnea and leg swelling. Gastrointestinal: Negative. Negative for abdominal pain, diarrhea and heartburn. Genitourinary: Negative for dysuria and hematuria. Skin: Negative. Neurological: Positive for weakness. Psychiatric/Behavioral: Negative for depression, substance abuse and suicidal ideas. The patient is not nervous/anxious. PE:  Patient Vitals for the past 24 hrs:   BP Temp Pulse Resp SpO2   11/14/21 1000 (!) 120/91  81 20 99 %   11/14/21 0900 125/64  72 18 96 %   11/14/21 0803  99.1 °F (37.3 °C)      11/14/21 0800 122/70  76 21 98 %   11/14/21 0700 118/66  67 19 95 %   11/14/21 0428 116/62 98.9 °F (37.2 °C) 68 18 95 %   11/14/21 0100     94 %   11/13/21 2313 113/64 99.1 °F (37.3 °C) 67 18 96 %   11/13/21 2033     95 %   11/13/21 1919  99.1 °F (37.3 °C)      11/13/21 1900 (!) 91/47 99.1 °F (37.3 °C) 77 20 93 %   11/13/21 1830   73 21 93 %   11/13/21 1800 103/61  72 23 94 %   11/13/21 1730   82 23 94 %   11/13/21 1700 104/61  70 21 94 %   11/13/21 1642  98.9 °F (37.2 °C)      11/13/21 1630   72 21 95 %   11/13/21 1604     95 %   11/13/21 1400 104/62  73 20 95 %   11/13/21 1330   80 23 96 %   11/13/21 1300 107/60  76 27 96 %   11/13/21 1230   73 23 97 %   11/13/21 1200 (!) 92/55  78 18 100 %   11/13/21 1130 (!) 102/53  78 24 96 %       Intake/Output Summary (Last 24 hours) at 11/14/2021 1107  Last data filed at 11/14/2021 9398  Gross per 24 hour   Intake 140 ml   Output 2100 ml   Net -1960 ml     Patient Vitals for the past 120 hrs:   Weight   11/10/21 0723 78.7 kg (173 lb 8 oz)   11/11/21 1600 79.1 kg (174 lb 6.1 oz)         Physical Exam  Vitals and nursing note reviewed.    Constitutional:       General: She is in acute distress. Appearance: She is ill-appearing. Neck:      Vascular: No JVD. Cardiovascular:      Rate and Rhythm: Normal rate and regular rhythm. Heart sounds: Normal heart sounds. Pulmonary:      Effort: No respiratory distress. Breath sounds: Normal breath sounds. Abdominal:      General: Bowel sounds are normal. There is no distension. Palpations: Abdomen is soft. Tenderness: There is no abdominal tenderness. There is no rebound. Musculoskeletal:         General: Normal range of motion. Cervical back: Normal range of motion and neck supple. Skin:     General: Skin is warm and dry. Neurological:      Mental Status: She is alert and oriented to person, place, and time. Psychiatric:         Mood and Affect: Affect normal.             Intake and Output:  Current Shift:  No intake/output data recorded. Last three shifts:  11/12 1901 - 11/14 0700  In: 140 [P.O.:120;  I.V.:20]  Out: 2450 [Urine:2450]    Lab/Data Reviewed:  Recent Results (from the past 8 hour(s))   GLUCOSE, POC    Collection Time: 11/14/21  5:59 AM   Result Value Ref Range    Glucose (POC) 153 (H) 70 - 110 mg/dL   GLUCOSE, POC    Collection Time: 11/14/21  7:46 AM   Result Value Ref Range    Glucose (POC) 131 (H) 70 - 110 mg/dL   D DIMER    Collection Time: 11/14/21  8:30 AM   Result Value Ref Range    D DIMER 1.05 (H) <0.46 ug/ml(FEU)   CBC WITH AUTOMATED DIFF    Collection Time: 11/14/21  8:30 AM   Result Value Ref Range    WBC 8.1 4.6 - 13.2 K/uL    RBC 4.22 4.20 - 5.30 M/uL    HGB 11.0 (L) 12.0 - 16.0 g/dL    HCT 37.9 35.0 - 45.0 %    MCV 89.8 78.0 - 100.0 FL    MCH 26.1 24.0 - 34.0 PG    MCHC 29.0 (L) 31.0 - 37.0 g/dL    RDW 13.7 11.6 - 14.5 %    PLATELET 089 (H) 273 - 420 K/uL    MPV 10.6 9.2 - 11.8 FL    NRBC 0.0 0  WBC    ABSOLUTE NRBC 0.00 0.00 - 0.01 K/uL    NEUTROPHILS 87 (H) 40 - 73 %    LYMPHOCYTES 9 (L) 21 - 52 %    MONOCYTES 3 3 - 10 %    EOSINOPHILS 0 0 - 5 %    BASOPHILS 0 0 - 2 % IMMATURE GRANULOCYTES 1 (H) 0.0 - 0.5 %    ABS. NEUTROPHILS 7.0 1.8 - 8.0 K/UL    ABS. LYMPHOCYTES 0.8 (L) 0.9 - 3.6 K/UL    ABS. MONOCYTES 0.2 0.05 - 1.2 K/UL    ABS. EOSINOPHILS 0.0 0.0 - 0.4 K/UL    ABS. BASOPHILS 0.0 0.0 - 0.1 K/UL    ABS. IMM. GRANS. 0.1 (H) 0.00 - 0.04 K/UL    DF AUTOMATED     METABOLIC PANEL, COMPREHENSIVE    Collection Time: 11/14/21  8:30 AM   Result Value Ref Range    Sodium 138 136 - 145 mmol/L    Potassium 5.0 3.5 - 5.5 mmol/L    Chloride 103 100 - 111 mmol/L    CO2 32 21 - 32 mmol/L    Anion gap 3 3.0 - 18 mmol/L    Glucose 131 (H) 74 - 99 mg/dL    BUN 18 7.0 - 18 MG/DL    Creatinine 0.71 0.6 - 1.3 MG/DL    BUN/Creatinine ratio 25 (H) 12 - 20      GFR est AA >60 >60 ml/min/1.73m2    GFR est non-AA >60 >60 ml/min/1.73m2    Calcium 8.9 8.5 - 10.1 MG/DL    Bilirubin, total 0.6 0.2 - 1.0 MG/DL    ALT (SGPT) 42 13 - 56 U/L    AST (SGOT) 26 10 - 38 U/L    Alk.  phosphatase 101 45 - 117 U/L    Protein, total 6.3 (L) 6.4 - 8.2 g/dL    Albumin 2.3 (L) 3.4 - 5.0 g/dL    Globulin 4.0 2.0 - 4.0 g/dL    A-G Ratio 0.6 (L) 0.8 - 1.7       Medications:  Current Facility-Administered Medications   Medication Dose Route Frequency    thiamine mononitrate (B-1) tablet 100 mg  100 mg Oral DAILY    insulin lispro (HUMALOG) injection 3 Units  3 Units SubCUTAneous TIDAC    insulin glargine (LANTUS) injection 10 Units  10 Units SubCUTAneous QHS    pantoprazole (PROTONIX) tablet 40 mg  40 mg Oral ACB    dexamethasone (DECADRON) 4 mg/mL injection 10 mg  10 mg IntraVENous Q24H    loperamide (IMODIUM) capsule 4 mg  4 mg Oral Q4H PRN    sodium chloride (NS) flush 5-10 mL  5-10 mL IntraVENous PRN    enoxaparin (LOVENOX) injection 30 mg  30 mg SubCUTAneous Q12H    acetaminophen (TYLENOL) tablet 650 mg  650 mg Oral Q6H PRN    Or    acetaminophen (TYLENOL) suppository 650 mg  650 mg Rectal Q6H PRN    guaiFENesin-dextromethorphan (ROBITUSSIN DM) 100-10 mg/5 mL syrup 5 mL  5 mL Oral Q4H PRN    cholecalciferol (VITAMIN D3) (1000 Units /25 mcg) tablet 2,000 Units  2,000 Units Oral DAILY    insulin lispro (HUMALOG) injection   SubCUTAneous AC&HS    glucose chewable tablet 16 g  4 Tablet Oral PRN    glucagon (GLUCAGEN) injection 1 mg  1 mg IntraMUSCular PRN    dextrose (D50W) injection syrg 12.5-25 g  25-50 mL IntraVENous PRN    sodium chloride (NS) flush 5-40 mL  5-40 mL IntraVENous Q8H    sodium chloride (NS) flush 5-40 mL  5-40 mL IntraVENous PRN    polyethylene glycol (MIRALAX) packet 17 g  17 g Oral DAILY PRN    ondansetron (ZOFRAN ODT) tablet 4 mg  4 mg Oral Q8H PRN    Or    ondansetron (ZOFRAN) injection 4 mg  4 mg IntraVENous Q6H PRN    zinc sulfate (ZINCATE) 50 mg zinc (220 mg) capsule 1 Capsule  1 Capsule Oral DAILY    ascorbic acid (vitamin C) (VITAMIN C) tablet 500 mg  500 mg Oral BID    melatonin (rapid dissolve) tablet 10 mg  10 mg Oral QHS    hydrALAZINE (APRESOLINE) 20 mg/mL injection 10 mg  10 mg IntraVENous Q6H PRN       Recent Results (from the past 24 hour(s))   GLUCOSE, POC    Collection Time: 11/13/21 11:11 AM   Result Value Ref Range    Glucose (POC) 176 (H) 70 - 110 mg/dL   GLUCOSE, POC    Collection Time: 11/13/21  4:36 PM   Result Value Ref Range    Glucose (POC) 102 70 - 110 mg/dL   GLUCOSE, POC    Collection Time: 11/13/21  9:04 PM   Result Value Ref Range    Glucose (POC) 168 (H) 70 - 110 mg/dL   GLUCOSE, POC    Collection Time: 11/14/21  5:59 AM   Result Value Ref Range    Glucose (POC) 153 (H) 70 - 110 mg/dL   GLUCOSE, POC    Collection Time: 11/14/21  7:46 AM   Result Value Ref Range    Glucose (POC) 131 (H) 70 - 110 mg/dL   D DIMER    Collection Time: 11/14/21  8:30 AM   Result Value Ref Range    D DIMER 1.05 (H) <0.46 ug/ml(FEU)   CBC WITH AUTOMATED DIFF    Collection Time: 11/14/21  8:30 AM   Result Value Ref Range    WBC 8.1 4.6 - 13.2 K/uL    RBC 4.22 4.20 - 5.30 M/uL    HGB 11.0 (L) 12.0 - 16.0 g/dL    HCT 37.9 35.0 - 45.0 %    MCV 89.8 78.0 - 100.0 FL    MCH 26.1 24.0 - 34.0 PG    MCHC 29.0 (L) 31.0 - 37.0 g/dL    RDW 13.7 11.6 - 14.5 %    PLATELET 263 (H) 827 - 420 K/uL    MPV 10.6 9.2 - 11.8 FL    NRBC 0.0 0  WBC    ABSOLUTE NRBC 0.00 0.00 - 0.01 K/uL    NEUTROPHILS 87 (H) 40 - 73 %    LYMPHOCYTES 9 (L) 21 - 52 %    MONOCYTES 3 3 - 10 %    EOSINOPHILS 0 0 - 5 %    BASOPHILS 0 0 - 2 %    IMMATURE GRANULOCYTES 1 (H) 0.0 - 0.5 %    ABS. NEUTROPHILS 7.0 1.8 - 8.0 K/UL    ABS. LYMPHOCYTES 0.8 (L) 0.9 - 3.6 K/UL    ABS. MONOCYTES 0.2 0.05 - 1.2 K/UL    ABS. EOSINOPHILS 0.0 0.0 - 0.4 K/UL    ABS. BASOPHILS 0.0 0.0 - 0.1 K/UL    ABS. IMM. GRANS. 0.1 (H) 0.00 - 0.04 K/UL    DF AUTOMATED     METABOLIC PANEL, COMPREHENSIVE    Collection Time: 11/14/21  8:30 AM   Result Value Ref Range    Sodium 138 136 - 145 mmol/L    Potassium 5.0 3.5 - 5.5 mmol/L    Chloride 103 100 - 111 mmol/L    CO2 32 21 - 32 mmol/L    Anion gap 3 3.0 - 18 mmol/L    Glucose 131 (H) 74 - 99 mg/dL    BUN 18 7.0 - 18 MG/DL    Creatinine 0.71 0.6 - 1.3 MG/DL    BUN/Creatinine ratio 25 (H) 12 - 20      GFR est AA >60 >60 ml/min/1.73m2    GFR est non-AA >60 >60 ml/min/1.73m2    Calcium 8.9 8.5 - 10.1 MG/DL    Bilirubin, total 0.6 0.2 - 1.0 MG/DL    ALT (SGPT) 42 13 - 56 U/L    AST (SGOT) 26 10 - 38 U/L    Alk.  phosphatase 101 45 - 117 U/L    Protein, total 6.3 (L) 6.4 - 8.2 g/dL    Albumin 2.3 (L) 3.4 - 5.0 g/dL    Globulin 4.0 2.0 - 4.0 g/dL    A-G Ratio 0.6 (L) 0.8 - 1.7         Procedures/imaging: see electronic medical records for all procedures/Xrays and details which were not copied into this note but were reviewed prior to creation of Kim Antonio MD   11/14/2021, 12:04 PM

## 2021-11-15 LAB
ALBUMIN SERPL-MCNC: 2.6 G/DL (ref 3.4–5)
ALBUMIN/GLOB SERPL: 0.6 {RATIO} (ref 0.8–1.7)
ALP SERPL-CCNC: 107 U/L (ref 45–117)
ALT SERPL-CCNC: 41 U/L (ref 13–56)
ANION GAP SERPL CALC-SCNC: 4 MMOL/L (ref 3–18)
AST SERPL-CCNC: 24 U/L (ref 10–38)
BASOPHILS # BLD: 0 K/UL (ref 0–0.1)
BASOPHILS NFR BLD: 0 % (ref 0–2)
BILIRUB SERPL-MCNC: 0.6 MG/DL (ref 0.2–1)
BUN SERPL-MCNC: 21 MG/DL (ref 7–18)
BUN/CREAT SERPL: 29 (ref 12–20)
CALCIUM SERPL-MCNC: 9.1 MG/DL (ref 8.5–10.1)
CHLORIDE SERPL-SCNC: 104 MMOL/L (ref 100–111)
CO2 SERPL-SCNC: 32 MMOL/L (ref 21–32)
CREAT SERPL-MCNC: 0.72 MG/DL (ref 0.6–1.3)
D DIMER PPP FEU-MCNC: 1.2 UG/ML(FEU)
DIFFERENTIAL METHOD BLD: ABNORMAL
EOSINOPHIL # BLD: 0 K/UL (ref 0–0.4)
EOSINOPHIL NFR BLD: 0 % (ref 0–5)
ERYTHROCYTE [DISTWIDTH] IN BLOOD BY AUTOMATED COUNT: 14 % (ref 11.6–14.5)
FERRITIN SERPL-MCNC: 532 NG/ML (ref 8–388)
GLOBULIN SER CALC-MCNC: 4.2 G/DL (ref 2–4)
GLUCOSE BLD STRIP.AUTO-MCNC: 113 MG/DL (ref 70–110)
GLUCOSE BLD STRIP.AUTO-MCNC: 132 MG/DL (ref 70–110)
GLUCOSE BLD STRIP.AUTO-MCNC: 144 MG/DL (ref 70–110)
GLUCOSE BLD STRIP.AUTO-MCNC: 177 MG/DL (ref 70–110)
GLUCOSE SERPL-MCNC: 147 MG/DL (ref 74–99)
HCT VFR BLD AUTO: 41.3 % (ref 35–45)
HGB BLD-MCNC: 12.1 G/DL (ref 12–16)
IMM GRANULOCYTES # BLD AUTO: 0.1 K/UL (ref 0–0.04)
IMM GRANULOCYTES NFR BLD AUTO: 2 % (ref 0–0.5)
LDH SERPL L TO P-CCNC: 423 U/L (ref 81–234)
LYMPHOCYTES # BLD: 0.6 K/UL (ref 0.9–3.6)
LYMPHOCYTES NFR BLD: 8 % (ref 21–52)
MCH RBC QN AUTO: 26 PG (ref 24–34)
MCHC RBC AUTO-ENTMCNC: 29.3 G/DL (ref 31–37)
MCV RBC AUTO: 88.6 FL (ref 78–100)
MONOCYTES # BLD: 0.1 K/UL (ref 0.05–1.2)
MONOCYTES NFR BLD: 2 % (ref 3–10)
NEUTS SEG # BLD: 6.8 K/UL (ref 1.8–8)
NEUTS SEG NFR BLD: 89 % (ref 40–73)
NRBC # BLD: 0 K/UL (ref 0–0.01)
NRBC BLD-RTO: 0 PER 100 WBC
PLATELET # BLD AUTO: 397 K/UL (ref 135–420)
PMV BLD AUTO: 10.7 FL (ref 9.2–11.8)
POTASSIUM SERPL-SCNC: 4.8 MMOL/L (ref 3.5–5.5)
PROT SERPL-MCNC: 6.8 G/DL (ref 6.4–8.2)
RBC # BLD AUTO: 4.66 M/UL (ref 4.2–5.3)
SODIUM SERPL-SCNC: 140 MMOL/L (ref 136–145)
WBC # BLD AUTO: 7.7 K/UL (ref 4.6–13.2)

## 2021-11-15 PROCEDURE — 83615 LACTATE (LD) (LDH) ENZYME: CPT

## 2021-11-15 PROCEDURE — 80053 COMPREHEN METABOLIC PANEL: CPT

## 2021-11-15 PROCEDURE — 74011636637 HC RX REV CODE- 636/637: Performed by: INTERNAL MEDICINE

## 2021-11-15 PROCEDURE — 74011250637 HC RX REV CODE- 250/637: Performed by: INTERNAL MEDICINE

## 2021-11-15 PROCEDURE — 85379 FIBRIN DEGRADATION QUANT: CPT

## 2021-11-15 PROCEDURE — 97166 OT EVAL MOD COMPLEX 45 MIN: CPT

## 2021-11-15 PROCEDURE — 97535 SELF CARE MNGMENT TRAINING: CPT

## 2021-11-15 PROCEDURE — 74011250636 HC RX REV CODE- 250/636: Performed by: INTERNAL MEDICINE

## 2021-11-15 PROCEDURE — 82728 ASSAY OF FERRITIN: CPT

## 2021-11-15 PROCEDURE — 76450000000

## 2021-11-15 PROCEDURE — 65660000000 HC RM CCU STEPDOWN

## 2021-11-15 PROCEDURE — 74011636637 HC RX REV CODE- 636/637: Performed by: HOSPITALIST

## 2021-11-15 PROCEDURE — 97110 THERAPEUTIC EXERCISES: CPT

## 2021-11-15 PROCEDURE — 85025 COMPLETE CBC W/AUTO DIFF WBC: CPT

## 2021-11-15 PROCEDURE — 82962 GLUCOSE BLOOD TEST: CPT

## 2021-11-15 PROCEDURE — 36415 COLL VENOUS BLD VENIPUNCTURE: CPT

## 2021-11-15 PROCEDURE — 97116 GAIT TRAINING THERAPY: CPT

## 2021-11-15 PROCEDURE — 77010033678 HC OXYGEN DAILY

## 2021-11-15 PROCEDURE — 74011250636 HC RX REV CODE- 250/636: Performed by: HOSPITALIST

## 2021-11-15 RX ORDER — DEXAMETHASONE 4 MG/1
10 TABLET ORAL EVERY 24 HOURS
Status: DISCONTINUED | OUTPATIENT
Start: 2021-11-15 | End: 2021-11-17

## 2021-11-15 RX ADMIN — ENOXAPARIN SODIUM 30 MG: 30 INJECTION SUBCUTANEOUS at 23:30

## 2021-11-15 RX ADMIN — Medication 10 ML: at 23:32

## 2021-11-15 RX ADMIN — PANTOPRAZOLE SODIUM 40 MG: 40 TABLET, DELAYED RELEASE ORAL at 06:38

## 2021-11-15 RX ADMIN — THIAMINE HCL TAB 100 MG 100 MG: 100 TAB at 10:32

## 2021-11-15 RX ADMIN — Medication 500 MG: at 23:30

## 2021-11-15 RX ADMIN — Medication 10 MG: at 23:30

## 2021-11-15 RX ADMIN — Medication 500 MG: at 10:32

## 2021-11-15 RX ADMIN — Medication 2000 UNITS: at 10:32

## 2021-11-15 RX ADMIN — Medication 10 ML: at 06:38

## 2021-11-15 RX ADMIN — INSULIN LISPRO 2 UNITS: 100 INJECTION, SOLUTION INTRAVENOUS; SUBCUTANEOUS at 18:21

## 2021-11-15 RX ADMIN — INSULIN GLARGINE 10 UNITS: 100 INJECTION, SOLUTION SUBCUTANEOUS at 23:31

## 2021-11-15 RX ADMIN — INSULIN LISPRO 3 UNITS: 100 INJECTION, SOLUTION INTRAVENOUS; SUBCUTANEOUS at 18:22

## 2021-11-15 RX ADMIN — ZINC SULFATE 220 MG (50 MG) CAPSULE 1 CAPSULE: CAPSULE at 09:00

## 2021-11-15 RX ADMIN — ENOXAPARIN SODIUM 30 MG: 30 INJECTION SUBCUTANEOUS at 10:32

## 2021-11-15 RX ADMIN — DEXAMETHASONE 10 MG: 4 TABLET ORAL at 23:30

## 2021-11-15 NOTE — PROGRESS NOTES
0037  Pt is resting quieytly in bed, language barrier noted. Pt does not appear to be in distress, shakes head no to \" dolor\". Lungs coarse in upper areas, dim in bases, on 5l NC. Shook intact. 0314  Resting quietly in NAD.     0506  Attempting BM on bedpan, complete bed change done due to large soft BM.    0630  Lab in for blood draw. Pt remains pleasant and cooperative in NAD.    0745  {[facility] BEDSIDE_VERBAL_Bedside shift change report given to Rosette Cox RN (oncoming nurse) by TESSA Joseph RN offgoing nurse). Report included the following information SBAR, Intake/Output, MAR, Recent Results, Cardiac Rhythm SR and Alarm Parameters .

## 2021-11-15 NOTE — PROGRESS NOTES
Patient transferred out of ICU. PCCM will sign off. Please call us with any questions.      Celeste Barcenas MD 11/15/2021 1:02 PM

## 2021-11-15 NOTE — PROGRESS NOTES
Bedside shift change report given to Neda (oncoming nurse) by China Gunderson Rn (offgoing nurse). Report included the following information SBAR, Kardex and Recent Results.

## 2021-11-15 NOTE — PROGRESS NOTES
CM notes patient oxygen needs improved to  5L NC. Please consider reordering therapy to assist with identifying any discharge planning needs. CM to continue to follow and remain available for any discharge planning needs.

## 2021-11-15 NOTE — PROGRESS NOTES
Problem: Self Care Deficits Care Plan (Adult)  Goal: *Acute Goals and Plan of Care (Insert Text)  Description: Occupational Therapy Goals  Initiated 11/15/2021 within 7 day(s). 1.  Patient will perform lower body dressing with supervision/set-up   2. Patient will perform toilet transfers with supervision/set-up. 3.  Patient will perform all aspects of toileting with supervision/set-up. 4.  Patient will participate in upper extremity therapeutic exercise/activities with supervision/set-up for 2 minutes. 5.  Patient will complete standing for 5 minutes with supervision during unilateral reaching activity with supervision to increase activity tolerance for functional activities. Outcome: Progressing Towards Goal     Occupational Therapy EVALUATION    Patient: Aminata Currie [de-identified]70 y.o. female)  Date: 11/15/2021  Primary Diagnosis: Pneumonia due to COVID-19 virus [U07.1, J12.82]  Acute respiratory failure due to COVID-19 (HCC) [U07.1, J96.00]        Precautions: Contact, Fall, Droplet    ASSESSMENT :  Based on the objective data described below, the patient presents with PNA and respiratory failure due to COVID. Pt limited by language barrier this session. Supine to sit EOB with supervision. Doff/don gown with supervision. Don/doff socks using cross leg technique with min assist and CGA for balance and safety in standing. Pt completed ambulation and sit to stand with CGA using RW to/from bathroom and toilet transfer with CGA. Toileting with CGA. Verbal cues throughout session for deep breathing technique. Pt on 5L of nasal canula and at rest 93 after activity O2 dropped to 83 and verbal cues for deep breathing. After a couple minutes pt recovered to 90s after rest period. Limited by language barrier and activity tolerance. Pt could benefit from OT to increase I with ADLs, transfers, mobility, activity tolerance and strength for functional activities.      Patient will benefit from skilled intervention to address the above impairments. Patients rehabilitation potential is considered to be Good  Factors which may influence rehabilitation potential include:   []             None noted  []             Mental ability/status  [x]             Medical condition  []             Home/family situation and support systems  []             Safety awareness  []             Pain tolerance/management  []             Other:      PLAN :  Recommendations and Planned Interventions:  [x]               Self Care Training                  [x]        Therapeutic Activities  [x]               Functional Mobility Training    []        Cognitive Retraining  [x]               Therapeutic Exercises           [x]        Endurance Activities  [x]               Balance Training                   []        Neuromuscular Re-Education  []               Visual/Perceptual Training     [x]   Home Safety Training  [x]               Patient Education                 []        Family Training/Education  []               Other (comment):    Frequency/Duration: Patient will be followed by occupational therapy 1-2 times per day/4-7 days per week to address goals. Discharge Recommendations: Home Health with supervision   Further Equipment Recommendations for Discharge: N/A     SUBJECTIVE:   Patient stated Lorre Clamp you baby.     OBJECTIVE DATA SUMMARY:     Past Medical History:   Diagnosis Date    Diabetes ([address]     HTN (hypertension)     Hyperlipidemia    History reviewed. No pertinent surgical history.   Barriers to Learning/Limitations: yes;  language  Compensate with: visual, verbal, tactile, kinesthetic cues/model  Prior Level of Function/Home Situation: I with ADLs prior, unsure of home environment due to language barrier  Home Situation  Home Environment: Private residence  Living Alone: No  Support Systems: Child(ama)  Patient Expects to be Discharged to[de-identified] House  []  Right hand dominant   []  Left hand dominant  Cognitive/Behavioral Status:  Safety/Judgement: Decreased awareness of need for safety; Fall prevention  Skin: intact  Edema: none noted  Vision/Perceptual:    N/A  Coordination:  Coordination: Within functional limits  Fine Motor Skills-Upper: Left Intact; Right Intact    Gross Motor Skills-Upper: Left Intact; Right Intact  Balance:  Sitting: Intact  Standing: Intact; With support  Standing - Static: Good  Standing - Dynamic : Fair  Strength:  Strength: Within functional limits  Tone & Sensation:  Tone: Normal  Sensation: Intact  Range of Motion:  AROM: Within functional limits  Functional Mobility and Transfers for ADLs:  Bed Mobility:  Supine to Sit: Supervision  Sit to Supine: Supervision  Scooting: Supervision  Transfers:  Sit to Stand: Contact guard assistance   Toilet Transfer : Contact guard assistance  ADL Assessment:  Upper Body Dressing: Supervision  Lower Body Dressing: Minimum assistance  Toileting: Contact guard assistance  ADL Intervention:  Cognitive Retraining  Safety/Judgement: Decreased awareness of need for safety; Fall prevention    Pain:  Pain Scale 1: Numeric (0 - 10)  Pain Intensity 1: 0    Activity Tolerance:   poor  Please refer to the flowsheet for vital signs taken during this treatment. After treatment:   [] Patient left in no apparent distress sitting up in chair  [x] Patient left in no apparent distress in bed  [x] Call bell left within reach  [x] Nursing notified  [] Caregiver present  [] Bed alarm activated    COMMUNICATION/EDUCATION:   [x] Home safety education was provided and the patient/caregiver indicated understanding. [x] Patient/family have participated as able in goal setting and plan of care. [x] Patient/family agree to work toward stated goals and plan of care. [] Patient understands intent and goals of therapy, but is neutral about his/her participation. [] Patient is unable to participate in goal setting and plan of care.     Thank you for this referral.  Evon De La Cruz, OTR/L  Time Calculation: 25 mins

## 2021-11-15 NOTE — PROGRESS NOTES
[facility]  (A Division of [address] [place] [address])                                                                                                                    Ko Puga MD  Office #: - Option # [phone]  Fax #: [phone]     Date of Admission: 11/3/2021Date of Note: 11/15/2021  Reason for FU: Evaluation and antibiotic management of CPVID 19 infection      Current Antimicrobials:    Prior Antimicrobials:  Dexamethasone 11/3 to date      Remdesivir 11/3 to date#5       Assessment- ID related:  --------------------------------------------------------------------------  · Acute hypoxic respiratory failure PaO2 50 on ABG:  Worse on HNC  CTA chest 11/3 and 11/7- no PE  SP Tociluzumab 11/11  · Viral PNA- Bilateral- Patchy infiltrate by CXR and B/L multilobular GGO on CT  · COVId 19 infection  S/P remdesvir  · Rhabdo 2/2 viral infection with transaminitis: Improving  · CoNS bacteremia- likely contaminant    COVId rapid + 11/3  Influenza A antigen : negative  Procal X3 NL- last 11/12  DD elevated to 7.84-> 2.28  CRP 12.7-> 14.5-> -> 2.2  Ferretin 42204-> 2953->-> 549  -> 379  IL 6 elevated 11/3  HIV 1/2- negative  Hepatitis C ab/ Hep B -negative. TB serology: Negative    Improving bio-markers while worsening hypoxia overnight, need further evaluation. Other Medical Issues- Mx per respective team:  · HTN  · Hyperlipidemia  · DM     Recommendation for ID issues I am following:  --------------------------------------------------------------------------  -cont dexamethasone   -wean off o2 as able    Monitor for high fevers- blood culture indicated      -> Vit complex/resp support with O2 per primary                 Subjective:  Afebrile, on 4-5l oxygen supplement, transferred out of ICU  Doing ok, smiling. Didn't use  audio  RANDELL RN- no acute issue  -No acute event overnight. RANDELL RN  Labs/notes reviewed        HPI:  Bela Sparks is a 70 y.o. BLACK/ who is unvaccinated with PMH as listed below with limited communication due to [address] barrier. Admitted on 11/3 with hypoxia/symptoms of SOB and fever and body aches X4 days. Was hypoxic to 86% on RA-- apparently with muliplte family members sick with COVID 19 too. Found with fever, B/L lung infiltrate on CXR and CTA chest without PE, elevared D-dimer, high ferretin and CRP. Oxygen demand has been 4litres and is better down to 2 L at exam today. Currently on Remdesvir/dexamethasone. [employer] [address] from admission 1 set + for [address] [facility]. Active Hospital Problems    Diagnosis Date Noted    Acute respiratory failure due to COVID-19 [facility]) 11/03/2021    Pneumonia due to COVID-19 virus 11/03/2021    HTN (hypertension) 11/03/2021    Diabetes mellitus type 2, controlled ([facility] 11/03/2021     Past Medical History:   Diagnosis Date    Diabetes ([facility]     HTN (hypertension)     Hyperlipidemia      History reviewed. No pertinent surgical history. Family History   Problem Relation Age of Onset    Hypertension Mother      Social History     Socioeconomic History    Marital status:      Spouse name: Not on file    Number of children: Not on file    Years of education: Not on file    Highest education level: Not on file   Occupational History    Not on file   Tobacco Use    Smoking status: Never Smoker    Smokeless tobacco: Never Used   Substance and Sexual Activity    Alcohol use: Yes    Drug use: Never    Sexual activity: Not Currently   Other Topics Concern    Not on file   Social History Narrative    Not on file     Social Determinants of Health     Financial Resource Strain:     Difficulty of Paying Living Expenses: Not on file   Food Insecurity:     Worried About Running Out of Food in the Last Year: Not on file    Aston of Food in the Last Year: Not on file   Transportation Needs:     Lack of Transportation (Medical): Not on file    Lack of Transportation (Non-Medical):  Not on file   Physical Activity:     Days of Exercise per Week: Not on file    Minutes of Exercise per Session: Not on file   Stress:     Feeling of Stress : Not on file   Social Connections:     Frequency of Communication with Friends and Family: Not on file    Frequency of Social Gatherings with Friends and Family: Not on file    Attends [employer] Services: Not on file    Active Member of [address] [place] [address] or Organizations: Not on file    Attends Club or Organization Meetings: Not on file    Marital Status: Not on file   Intimate Partner Violence:     Fear of Current or Ex-Partner: Not on file    Emotionally Abused: Not on file    Physically Abused: Not on file    Sexually Abused: Not on file   Housing Stability:     Unable to Pay for Housing in the Last Year: Not on file    Number of [place] in the Last Year: Not on file    Unstable Housing in the Last Year: Not on file       Allergies:  Patient has no known allergies.      Medications:  Current Facility-Administered Medications   Medication Dose Route Frequency    dexAMETHasone (DECADRON) tablet 10 mg  10 mg Oral Q24H    thiamine mononitrate (B-1) tablet 100 mg  100 mg Oral DAILY    insulin lispro (HUMALOG) injection 3 Units  3 Units SubCUTAneous TIDAC    insulin glargine (LANTUS) injection 10 Units  10 Units SubCUTAneous QHS    pantoprazole (PROTONIX) tablet 40 mg  40 mg Oral ACB    loperamide (IMODIUM) capsule 4 mg  4 mg Oral Q4H PRN    sodium chloride (NS) flush 5-10 mL  5-10 mL IntraVENous PRN    enoxaparin (LOVENOX) injection 30 mg  30 mg SubCUTAneous Q12H    acetaminophen (TYLENOL) tablet 650 mg  650 mg Oral Q6H PRN    Or    acetaminophen (TYLENOL) suppository 650 mg  650 mg Rectal Q6H PRN    guaiFENesin-dextromethorphan (ROBITUSSIN DM) 100-10 mg/5 mL syrup 5 mL  5 mL Oral Q4H PRN    cholecalciferol (VITAMIN D3) (1000 Units /25 mcg) tablet 2,000 Units  2,000 Units Oral DAILY    insulin lispro (HUMALOG) injection   SubCUTAneous AC&HS    glucose chewable tablet 16 g  4 Tablet Oral PRN    glucagon (GLUCAGEN) injection 1 mg  1 mg IntraMUSCular PRN    dextrose (D50W) injection syrg 12.5-25 g  25-50 mL IntraVENous PRN    sodium chloride (NS) flush 5-40 mL  5-40 mL IntraVENous Q8H    sodium chloride (NS) flush 5-40 mL  5-40 mL IntraVENous PRN    polyethylene glycol (MIRALAX) packet 17 g  17 g Oral DAILY PRN    ondansetron (ZOFRAN ODT) tablet 4 mg  4 mg Oral Q8H PRN    Or    ondansetron (ZOFRAN) injection 4 mg  4 mg IntraVENous Q6H PRN    zinc sulfate (ZINCATE) 50 mg zinc (220 mg) capsule 1 Capsule  1 Capsule Oral DAILY    ascorbic acid (vitamin C) (VITAMIN C) tablet 500 mg  500 mg Oral BID    melatonin (rapid dissolve) tablet 10 mg  10 mg Oral QHS    hydrALAZINE (APRESOLINE) 20 mg/mL injection 10 mg  10 mg IntraVENous Q6H PRN        Physical Exam:    Temp (24hrs), Av °F (36.7 °C), Min:97.8 °F (36.6 °C), Max:98.7 °F (37.1 °C)    Visit Vitals  /72 (BP 1 Location: Left upper arm, BP Patient Position: At rest)   Pulse 75   Temp 97.8 °F (36.6 °C)   Resp 19   Ht 5' 3\" (1.6 m)   Wt 78.5 kg (173 lb)   SpO2 94%   BMI 30.65 kg/m²          GEN: WD Pbese, on her side. On O2 via NC  HEENT: Unicteric. EOMI intact  CHEST: Non laboured breathing. CTA  ABD: Obese/soft. Non tender. PEPE: Deferred  EXT: No apparent swelling or redness on UE/LE joints. Skin: Dry and intact. No rash, no redness. CNS: A, OX3. Moves all extremity. CN grossly ok. Pleasant affect     Microbiology  All Micro Results     Procedure Component Value Units Date/Time    CULTURE, BLOOD [[id number]]  (Abnormal) Collected: 21 5574    Order Status: Completed Specimen: Blood Updated: 21 1227     Special Requests: NO SPECIAL REQUESTS        GRAM STAIN       AEROBIC BOTTLE GRAM POSITIVE COCCI IN CLUSTERS                  SMEAR CALLED TO AND CORRECTLY REPEATED BY: Khari Zaldivar RN 3N BY CAM ON 21 AT [address] [place] [address].                   GRAM POSITIVE COCCI IN CLUSTERS ANAEROBIC BOTTLE SMEAR CALLED TO AND CORRECTLY REPEATED BY: Pauline Lam RN 3N AT 1500 ON 11/04/2021 TO [facility]. Culture result:       STAPHYLOCOCCUS SPECIES, COAGULASE NEGATIVE MULTIPLE COLONY TYPES/STRAINS GROWING IN THE AEROBIC AND ANAEROBIC BOTTLES NO SITE INDICATED          LEGIONELLA PNEUMOPHILA AG, URINE [[id number]] Collected: 11/03/21 2045    Order Status: Canceled Specimen: Urine     STREP Daiva Scales, URINE [[id number]] Collected: 11/03/21 2045    Order Status: Canceled Specimen: Urine     CULTURE, RESPIRATORY/SPUTUM/BRONCH Linette Sprout STAIN [[id number]] Collected: 11/03/21 2045    Order Status: Canceled Specimen: Sputum     INFLUENZA A & B AG (RAPID TEST) [[id number]] Collected: 11/03/21 1813    Order Status: Completed Specimen: Nasopharyngeal from Nasal washing Updated: 11/03/21 1843     Influenza A Antigen Negative        Comment: A negative result does not exclude influenza virus infection, seasonal or H1N1 due to suboptimal sensitivity. If influenza is circulating in your community, a diagnosis of influenza should be considered based on a patients clinical presentation and empiric antiviral treatment should be considered, if indicated. Influenza B Antigen Negative       COVID-19 RAPID TEST [[id number]]  (Abnormal) Collected: 11/03/21 1813    Order Status: Completed Specimen: Nasopharyngeal Updated: 11/03/21 1840     Specimen source Nasopharyngeal        COVID-19 rapid test Detected        Comment:      The specimen is POSITIVE for SARS-CoV-2, the novel coronavirus associated with COVID-19. This test has been authorized by the FDA under an Emergency Use Authorization (EUA) for use by authorized laboratories.         Fact sheet for Healthcare Providers: ConventionUpdate.co.nz  Fact sheet for Patients: ConventionUpdate.co.nz       Methodology: Isothermal Nucleic Acid Amplification  CALLED TO AND CORRECTLY REPEATED BY:  Felecia TAY IN ER  [place] ON 11/03/2021 TO [facility].                  Lines / Catheters:  Lab results:    Chemistry  Recent Labs     11/15/21  0545 11/14/21  0830 11/13/21  0615   * 131* 171*    138 138   K 4.8 5.0 4.9    103 104   CO2 32 32 31   BUN 21* 18 19*   CREA 0.72 0.71 0.65   CA 9.1 8.9 8.3*   AGAP 4 3 3   BUCR 29* 25* 29*    101 95   TP 6.8 6.3* 5.8*   ALB 2.6* 2.3* 2.0*   GLOB 4.2* 4.0 3.8   AGRAT 0.6* 0.6* 0.5*       CBC w/ Diff  Recent Labs     11/15/21  0545 11/14/21  0830 11/13/21  0615   WBC 7.7 8.1 7.2   RBC 4.66 4.22 3.88*   HGB 12.1 11.0* 10.3*   HCT 41.3 37.9 34.4*    430* 411   GRANS 89* 87* 87*   LYMPH 8* 9* 8*   EOS 0 0 0       Imaging: report posted below as per radiologist - reports reviewed in Epic

## 2021-11-15 NOTE — ACP (ADVANCE CARE PLANNING)
Advance Care Planning     General Advance Care Planning (ACP) Conversation  Date of Conversation: 11/15/2021  Conducted with: Son-in-law Paulett Siemens)    Healthcare Decision Maker:     Primary Decision Maker: Scott London - [phone]    Secondary Decision Maker: Tierra Stone - Daughter - [phone]    Today we documented Decision Maker(s) consistent with Legal Next of Kin hierarchy. Content/Action Overview:   Has NO ACP documents/care preferences. Patient elects Full Code (Attempt Resuscitation) per discussion with Dr Yosvany Espinoza on 11/11/2021 using video      11/15/2021 0935 Seen today in room 350. Lying in bed having just finished breakfast (at 100% of meal)  Awake, alert. Conversation limited as video  was not in room. Smiling. States is [place]" (good in [facility]). Respirations unlabored with oxygen on at 5 lpm per NC. Came to the ED per POV on 11/3/2021 for fever and productive cough. She had (+) COVID contacts at home and was unvaccinated. Her COVID test was positive. Her  was admitted to the hospital on 11/5/2021 for COVID. Admitted for oxygen supplementation, telemetry, ID consultation, telemetry, echo, steroids, MD meagan management, HTN management. On 11/11/2021 her respiratory status worsened requiring more oxygen and transitioned to HFNC. She was transferred to the ICU. On 11/14/2021 she was transferred back to the floor. ABGs Ref.  Range 11/3/2021 18:11 11/11/2021 10:32   pH (POC) Latest Ref Range: 7.35 - 7.45   7.50 (H) 7.46 (H)   pCO2 (POC) Latest Ref Range: 35.0 - 45.0 MMHG 34.5 (L) 39.3   pO2 (POC) Latest Ref Range: 80 - 100 MMHG 50 (L) 66 (L)   HCO3 (POC) Latest Ref Range: 22 - 26 MMOL/L 26.9 (H) 27.8 (H)   sO2 (POC) Latest Ref Range: 92 - 97 % 88.6 (L) 93.6   Base excess (POC) Latest Units: mmol/L 3.8 3.7   FIO2 (POC) Latest Units: %  90   Specimen type (POC) Latest Units:   ARTERIAL ARTERIAL   Site Latest Units:   LEFT RADIAL RIGHT RADIAL Device: Latest Units:   ROOM AIR High Flow Nasal Cannula     11/3/2021: CTA chest: 1. No pulmonary embolism identified. 2. Bilateral multilobar groundglass infiltrates secondary to COVID 19 pneumonia. 11/7/2021: CTA chest: 1. No evidence of pulmonary embolism. 2.  Numerous bilateral multilobar groundglass infiltrates again seen. 3. Small bilateral pleural effusions. 11/4/2021: Duplex lower extremities: No evidence of DVT    11/11/2021: Duplex lower extremities: No evidence of DVT    11/4/2021: ECHO: EF 60-65%    PMH significant for HTN, dyslipidemia, DM II,     The palliative care team has been consulted for goals of care discussion    Lives with her daughter, son-in-law,their children, and her . Prior to admission,she was independent in ADLs. Uses nothing for ambulation assistance. 4173: telephone conversation with Cesar Mo, her son-in-law. He stated that his wife, Silverio Darnell, is Ms Shook's only child. He also told me that Ms Shook's , Cynthia Henry is an inpatient [facility] at [facility] [place]. Introduced the role of palliative medicine for the hospitalized patient. Brief medical update given. I did explain that per [place] of decision making, Mr Jayesh Dawson and then Silverio Chaos would be the surrogate medical decision maker if Ms Augusto Jackson was not able to communicate her wishes. Deatra Schwab said he would let Silverio Darnell know. Will reach out to  Silverio Darnell tomorrow for full goals of care discussion. Disposition plan: anticipate home with family support. Before her recent transfer to the ICU,  PT had recommended home with home health. Palliative care will continue to follow Domenic Lares  and her family during her hospitalization and support them as they make healthcare decisions and define goals of care.       Efrain Downs RN, MSN  Palliative Medicine  P: [phone]

## 2021-11-15 NOTE — PROGRESS NOTES
Problem: Mobility Impaired (Adult and Pediatric)  Goal: *Acute Goals and Plan of Care (Insert Text)  Description: Initiated 11/6/2021 and to be accomplished within 7 day(s)  1. Patient will move from supine to sit and sit to supine  in bed with modified independence. 2.  Patient will transfer from bed to chair and chair to bed with modified independence using the least restrictive device. 3.  Patient will perform sit to stand with modified independence. 4.  Patient will ambulate with modified independence for 150 feet with the least restrictive device. Outcome: Progressing Towards Goal   PHYSICAL THERAPY TREATMENT    Patient: Linnea Petty (4 y.o. female)  Date: 11/15/2021  Diagnosis: Pneumonia due to COVID-19 virus [U07.1, J12.82]  Acute respiratory failure due to COVID-19 ([facility] [U07.1, J96.00]   Pneumonia due to COVID-19 virus       Precautions: Fall  PLOF: independent with mobility, lives with family    ASSESSMENT:  Pt was pleasant and cooperative throughout session. Pt was on 5L of O2 with O2 sat at rest of 91%, with activity 88%. Pt was mod I with bed mobility and SBA with standing transfers. Pt ambulated 28 feet with RW and SBA/CGA for balance. Pt returned to supine and was left in bed with needs in reach. Progression toward goals:   []      Improving appropriately and progressing toward goals  [x]      Improving slowly and progressing toward goals  []      Not making progress toward goals and plan of care will be adjusted     PLAN:  Patient continues to benefit from skilled intervention to address the above impairments. Continue treatment per established plan of care. Discharge Recommendations:  Home Physical Therapy with increased family assistance  Further Equipment Recommendations for Discharge:  rolling walker     SUBJECTIVE:   Patient stated Tejal Hamilton, thank you.     OBJECTIVE DATA SUMMARY:   Critical Behavior:  Calm and cooperative  Functional Mobility Training:  Bed Mobility:  Supine to Sit: Modified independent  Sit to Supine: Modified independent      Transfers:  Sit to Stand: Stand-by assistance  Stand to Sit: Stand-by assistance     Balance:  Sitting: Intact  Standing: With support  Standing - Static: Good  Standing - Dynamic : Fair   Ambulation/Gait Training:  Distance (ft): 28 Feet (ft)  Assistive Device: Walker, rolling  Ambulation - Level of Assistance: Stand-by assistance; Contact guard assistance  Gait Abnormalities: Decreased step clearance  Base of Support: Widened  Speed/Wanda: Slow  Step Length: Right shortened; Left shortened    Therapeutic Exercises:         EXERCISE   Sets   Reps   Active Active Assist   Passive Self ROM   Comments   Ankle Pumps 1 10  [x] [] [] []    Quad Sets/Glut Sets    [] [] [] [] Hold for 5 secs   Hamstring Sets   [] [] [] []    Short Arc Quads   [] [] [] []    Heel Slides   [] [] [] []    Straight Leg Raises   [] [] [] []    Hip Add   [] [] [] [] Hold for 5 secs, w/ pillow squeeze   Long Arc Quads 1 10 [x] [] [] []    Seated Marching 1 10 [x] [] [] []    Standing Marching   [] [] [] []       [] [] [] []        Pain:  Pain level pre-treatment: 0/10  Pain level post-treatment: 0/10   Pain Intervention(s): n/a    Activity Tolerance:   Fair    Please refer to the flowsheet for vital signs taken during this treatment. After treatment:   [] Patient left in no apparent distress sitting up in chair  [x] Patient left in no apparent distress in bed  [x] Call bell left within reach  [] Nursing notified  [] Caregiver present  [] Bed alarm activated  [] SCDs applied      COMMUNICATION/EDUCATION:   [x]         Role of Physical Therapy in the acute care setting. [x]         Fall prevention education was provided and the patient/caregiver indicated understanding. [x]         Patient/family have participated as able in working toward goals and plan of care. [x]         Patient/family agree to work toward stated goals and plan of care.   []         Patient understands intent and goals of therapy, but is neutral about his/her participation.   []         Patient is unable to participate in stated goals/plan of care: ongoing with therapy staff.  []         Other:        Moshe Jurado, PT   Time Calculation: 25 mins

## 2021-11-15 NOTE — PROGRESS NOTES
THE [facility]: The pharmacist has determined that this patient meets P & T approved criteria for conversion from IV to oral therapy for the following medication:  Decadron    The pharmacist has initiated the following order:  Decadron 10 mg po q24 hrs. The pharmacist will continue to monitor the patient's status for appropriateness    of oral therapy. If the patient no longer meets all criteria for oral therapy, the pharmacist will switch back     to IV therapy.     Shy Pedersen, [address] [place] [address], Pharmacist   11/15/2021 1:54 PM

## 2021-11-15 NOTE — PROGRESS NOTES
Hospitalist Progress Note-critical care note     Patient: Camilo Davenport MRN: [id number]  CSN: [id number]    YOB: 1950  Age: 70 y.o. Sex: female    DOA: 11/3/2021 LOS:  LOS: 12 days            Chief complaint: covid 19 pnam, htn . DM     Assessment/Plan         Hospital Problems  Date Reviewed: 11/3/2021          Codes Class Noted POA    Acute respiratory failure due to COVID-19 [facility]) ICD-10-CM: U07.1, J96.00  ICD-9-CM: 518.81, 079.89  11/3/2021 Unknown        * (Principal) Pneumonia due to COVID-19 virus ICD-10-CM: U07.1, J12.82  ICD-9-CM: 480.8, 079.89  11/3/2021 Unknown        HTN (hypertension) ICD-10-CM: I10  ICD-9-CM: 401.9  11/3/2021 Unknown        Diabetes mellitus type 2, controlled ([facility] ICD-10-CM: E11.9  ICD-9-CM: 250.00  11/3/2021 Unknown             acute respiratory failure with hypoxia   improving over the weekend   Off high flow, now on nc 5 L oxygen        Pneumonia due to Covid with hypoxia,  Continue iv decadron   And tociluzuma   Completed remdesivir  Continue supportive treatment   duplex of lower extremity neg for DVT on 11/4,11/11  lovenox BID for DVT prevention     Hyperglycemia/diabetes:   Well controlled now   Continue lantus 10 and  premeal insulin ssi      Hypertension  Continue hydralazine as needed     Mild increase in liver enzymes,   Due to viral infection   lft wnl        Pt was seen and examed in full PPE       All questions have been answered. 35 total min's spent on patient care including >50% on counseling/coordinating care. Discussed the above assessments.  also discussed labs, medications and hospital course    Subjective: sob is better, I can walk around     RN as interpretor     Will brenda hawk, pt/ot today , continue weaning off nc oxygen     Disposition :tbd,   Review of systems:  General: no fever /no chills   Lung :+ sob better , no wheezing   Heart : no chest pain , no palpitation   Gi: no abdomen pain, no n/v     Vital signs/Intake and Output:  Visit Vitals  BP (!) 144/72 (BP 1 Location: Left upper arm, BP Patient Position: At rest)   Pulse 70   Temp 97.8 °F (36.6 °C)   Resp 19   Ht 5' 3\" (1.6 m)   Wt 78.5 kg (173 lb)   SpO2 94%   BMI 30.65 kg/m²     Current Shift:  No intake/output data recorded. Last three shifts:  11/13 1901 - 11/15 0700  In: 440 [P.O.:420; I.V.:20]  Out: 3050 [Urine:3050]    Physical Exam:  General: WD, WN. Alert, cooperative, in distress    HEENT: NC, Atraumatic. PERRLA, anicteric sclerae. Lungs: CTA Bilaterally. No Wheezing/Rhonchi/Rales. Heart:  Regular  rhythm,  No murmur, No Rubs, No Gallops  Abdomen: Soft, Non distended, Non tender. +Bowel sounds,   Extremities: No c/c/e  Psych:   Not anxious or agitated. Neurologic:  No acute neurological deficit. Labs: Results:       Chemistry Recent Labs     11/15/21  0545 11/14/21  0830 11/13/21  0615   * 131* 171*    138 138   K 4.8 5.0 4.9    103 104   CO2 32 32 31   BUN 21* 18 19*   CREA 0.72 0.71 0.65   CA 9.1 8.9 8.3*   AGAP 4 3 3   BUCR 29* 25* 29*    101 95   TP 6.8 6.3* 5.8*   ALB 2.6* 2.3* 2.0*   GLOB 4.2* 4.0 3.8   AGRAT 0.6* 0.6* 0.5*      CBC w/Diff Recent Labs     11/15/21  0545 11/14/21  0830 11/13/21  0615   WBC 7.7 8.1 7.2   RBC 4.66 4.22 3.88*   HGB 12.1 11.0* 10.3*   HCT 41.3 37.9 34.4*    430* 411   GRANS 89* 87* 87*   LYMPH 8* 9* 8*   EOS 0 0 0      Cardiac Enzymes No results for input(s): CPK, CKND1, ROSINA in the last 72 hours. No lab exists for component: CKRMB, TROIP   Coagulation No results for input(s): PTP, INR, APTT, INREXT, INREXT in the last 72 hours. Lipid Panel No results found for: CHOL, CHOLPOCT, CHOLX, CHLST, CHOLV, 174019, HDL, HDLP, LDL, LDLC, DLDLP, 729735, VLDLC, VLDL, TGLX, TRIGL, TRIGP, TGLPOCT, CHHD, CHHDX   BNP No results for input(s): BNPP in the last 72 hours.    Liver Enzymes Recent Labs     11/15/21  0545   TP 6.8   ALB 2.6*         Thyroid Studies No results found for: T4, T3U, TSH, TSHEXT, TSHEXT     Procedures/imaging: see electronic medical records for all procedures/Xrays and details which were not copied into this note but were reviewed prior to creation of Plan    CTA CHEST W OR W WO CONT    Result Date: 11/8/2021  EXAM: CTA chest INDICATION: Pain. COMPARISON: November 3, 2021. TECHNIQUE: Axial CT imaging from the thoracic inlet through the diaphragm with intravenous contrast. Coronal and sagittal MIP reformats were generated. Dose reduction techniques used: automated exposure control, adjustment of the mAs and/or kVp according to patient size, and iterative reconstruction techniques. Digital imaging and communications in Medicine (DICOM) format image data are available to nonaffiliated external healthcare facilities or entities on a secure, media free, reciprocally searchable basis with patient authorization for at least 12 months after this study. _______________ FINDINGS: EXAM QUALITY: Adequate. PULMONARY ARTERIES: No evidence of pulmonary embolism. MEDIASTINUM: Normal heart size. No evidence of right heart strain. Aorta is unremarkable. No pericardial effusion. LUNGS: There are again seen numerous bilateral multilobar groundglass infiltrates. PLEURA: There are small bilateral pleural effusions. AIRWAY: Normal. LYMPH NODES: No enlarged nodes. UPPER ABDOMEN: Unremarkable. OTHER: No acute or aggressive osseous abnormalities identified. _______________     1. No evidence of pulmonary embolism. 2.  Numerous bilateral multilobar groundglass infiltrates again seen. 3. Small bilateral pleural effusions. CTA CHEST W OR W WO CONT    Result Date: 11/3/2021  EXAM: CTA chest CLINICAL INDICATION/HISTORY: COVID pneumonia, shortness of breath COMPARISON: Chest radiograph from the same day TECHNIQUE: Axial CT imaging from the thoracic inlet through the diaphragm with intravenous contrast. Coronal and sagittal MIP reformats were generated.  One or more dose reduction techniques were used on this CT: automated exposure control, adjustment of the mAs and/or kVp according to patient size, and iterative reconstruction techniques. The specific techniques used on this CT exam have been documented in the patient's electronic medical record. Digital Imaging and Communications in Medicine (DICOM) format image data are available to nonaffiliated external healthcare facilities or entities on a secured, media free, reciprocally searchable basis with patient authorization for at least a 12-month period after this study. _______________ FINDINGS: EXAM QUALITY: Quit PULMONARY ARTERIES: No pulmonary embolism identified. MEDIASTINUM: Normal heart size. Aorta is unremarkable. No pericardial effusion. LUNGS: Bilateral multilobar groundglass infiltrates secondary to COVID 19 pneumonia. AIRWAY: Normal. PLEURA: Normal. LYMPH NODES: No enlarged nodes. UPPER ABDOMEN: Unremarkable. BONES: No acute or aggressive osseous abnormalities identified. OTHER: None. _______________     1. No pulmonary embolism identified. 2. Bilateral multilobar groundglass infiltrates secondary to COVID 19 pneumonia.  ABD LTD    Result Date: 11/4/2021  EXAM: Limited right upper quadrant abdominal ultrasound INDICATION: Elevated liver function tests. COMPARISON: CT angiogram of the chest 11/3/2021. TECHNIQUE: Real-time abdomen/right upper quadrant sonography in multiple planes was performed with image documentation. Grayscale, color flow Doppler imaging, and velocity spectral waveform analysis of the portal vein was performed (duplex imaging). _______________ FINDINGS: LIVER: Mildly increased hepatic parenchymal echogenicity is present. No focal mass Color flow Doppler and velocity spectral waveform analysis of the portal vein shows normal (hepatopedal) direction of flow. Pamalee Bucker BILIARY SYSTEM: No intrahepatic biliary dilatation. Common bile duct is normal in caliber measuring 0.4 cm. GALLBLADDER: No gallstones or gallbladder wall thickening.   No pericholecystic fluid. RIGHT KIDNEY: 8.6 cm in length. No hydronephrosis or renal mass. No visible calculi. PANCREAS: Head and body are unremarkable in appearance though the tail is obscured by overlying bowel gas. IVC: Visualized portions are unremarkable in appearance. OTHER: No free intraperitoneal fluid. _______________     Mildly increased hepatic parenchymal echotexture which can be seen in the context of steatosis and/or hepatocellular dysfunction. No biliary ductal dilatation or mass lesion. XR CHEST PORT    Result Date: 11/7/2021  EXAM: XR CHEST PORT CLINICAL INDICATION/HISTORY: Worsening hypoxia   > Additional: None. COMPARISON: November 3, 2021 TECHNIQUE: Portable chest _______________ FINDINGS: SUPPORT DEVICES: None. HEART AND MEDIASTINUM: The heart is stable in size and contour. LUNGS AND PLEURAL SPACES: Extensive multifocal airspace opacities and consolidation are seen diffusely throughout the lungs little interval change. BONY THORAX AND SOFT TISSUES: No acute osseous abnormality. _______________     Extensive multifocal airspace opacities similar to prior study. XR CHEST PORT    Result Date: 11/3/2021  EXAM: CHEST RADIOGRAPH CLINICAL INDICATION/HISTORY: meets SIRS criteria   > Additional: Short of breath and cough COMPARISON: None TECHNIQUE: Portable frontal view of the chest _______________ FINDINGS: SUPPORT DEVICES: None. HEART AND MEDIASTINUM: Heart size is normal. LUNGS AND PLEURAL SPACES: Patchy bilateral consolidations. No pleural effusion or pneumothorax. BONES AND SOFT TISSUES: Unremarkable. _______________     Patchy bilateral consolidations could relate to COVID 19 pneumonia. ECHO ADULT COMPLETE    Result Date: 11/4/2021  · Left Ventricle: Normal cavity size, wall thickness and systolic function (ejection fraction normal). The estimated EF is 60 - 65%. There is mild (grade 1) left ventricular diastolic dysfunction. Wall Scoring:  The left ventricular wall motion is normal. · Tricuspid Valve: Normal valve structure and no stenosis. Tricuspid regurgitation is inadequate for estimation of right ventricular systolic pressure. DUPLEX LOWER EXT VENOUS BILAT    Result Date: 11/5/2021  · No evidence of deep vein thrombosis in the right lower extremity. · No evidence of deep vein thrombosis in the left lower extremity.         Ronni Salas MD

## 2021-11-16 LAB
ALBUMIN SERPL-MCNC: 2.2 G/DL (ref 3.4–5)
ALBUMIN/GLOB SERPL: 0.6 {RATIO} (ref 0.8–1.7)
ALP SERPL-CCNC: 89 U/L (ref 45–117)
ALT SERPL-CCNC: 34 U/L (ref 13–56)
ANION GAP SERPL CALC-SCNC: 2 MMOL/L (ref 3–18)
AST SERPL-CCNC: 19 U/L (ref 10–38)
BASOPHILS # BLD: 0 K/UL (ref 0–0.1)
BASOPHILS NFR BLD: 0 % (ref 0–2)
BILIRUB SERPL-MCNC: 0.4 MG/DL (ref 0.2–1)
BUN SERPL-MCNC: 19 MG/DL (ref 7–18)
BUN/CREAT SERPL: 27 (ref 12–20)
CALCIUM SERPL-MCNC: 8.3 MG/DL (ref 8.5–10.1)
CHLORIDE SERPL-SCNC: 106 MMOL/L (ref 100–111)
CO2 SERPL-SCNC: 31 MMOL/L (ref 21–32)
CREAT SERPL-MCNC: 0.71 MG/DL (ref 0.6–1.3)
D DIMER PPP FEU-MCNC: 1.02 UG/ML(FEU)
DIFFERENTIAL METHOD BLD: ABNORMAL
EOSINOPHIL # BLD: 0 K/UL (ref 0–0.4)
EOSINOPHIL NFR BLD: 0 % (ref 0–5)
ERYTHROCYTE [DISTWIDTH] IN BLOOD BY AUTOMATED COUNT: 14.2 % (ref 11.6–14.5)
FERRITIN SERPL-MCNC: 426 NG/ML (ref 8–388)
GLOBULIN SER CALC-MCNC: 3.4 G/DL (ref 2–4)
GLUCOSE BLD STRIP.AUTO-MCNC: 101 MG/DL (ref 70–110)
GLUCOSE BLD STRIP.AUTO-MCNC: 120 MG/DL (ref 70–110)
GLUCOSE BLD STRIP.AUTO-MCNC: 167 MG/DL (ref 70–110)
GLUCOSE BLD STRIP.AUTO-MCNC: 86 MG/DL (ref 70–110)
GLUCOSE SERPL-MCNC: 186 MG/DL (ref 74–99)
HCT VFR BLD AUTO: 35 % (ref 35–45)
HGB BLD-MCNC: 10.5 G/DL (ref 12–16)
IL6 SERPL-MCNC: 21.9 PG/ML (ref 0–13)
IMM GRANULOCYTES # BLD AUTO: 0.1 K/UL (ref 0–0.04)
IMM GRANULOCYTES NFR BLD AUTO: 1 % (ref 0–0.5)
LDH SERPL L TO P-CCNC: 243 U/L (ref 81–234)
LYMPHOCYTES # BLD: 0.6 K/UL (ref 0.9–3.6)
LYMPHOCYTES NFR BLD: 9 % (ref 21–52)
MCH RBC QN AUTO: 27 PG (ref 24–34)
MCHC RBC AUTO-ENTMCNC: 30 G/DL (ref 31–37)
MCV RBC AUTO: 90 FL (ref 78–100)
MONOCYTES # BLD: 0.3 K/UL (ref 0.05–1.2)
MONOCYTES NFR BLD: 5 % (ref 3–10)
NEUTS SEG # BLD: 5.6 K/UL (ref 1.8–8)
NEUTS SEG NFR BLD: 85 % (ref 40–73)
NRBC # BLD: 0 K/UL (ref 0–0.01)
NRBC BLD-RTO: 0 PER 100 WBC
PLATELET # BLD AUTO: 325 K/UL (ref 135–420)
PMV BLD AUTO: 10.6 FL (ref 9.2–11.8)
POTASSIUM SERPL-SCNC: 5 MMOL/L (ref 3.5–5.5)
PROT SERPL-MCNC: 5.6 G/DL (ref 6.4–8.2)
RBC # BLD AUTO: 3.89 M/UL (ref 4.2–5.3)
SODIUM SERPL-SCNC: 139 MMOL/L (ref 136–145)
WBC # BLD AUTO: 6.5 K/UL (ref 4.6–13.2)

## 2021-11-16 PROCEDURE — 74011636637 HC RX REV CODE- 636/637: Performed by: INTERNAL MEDICINE

## 2021-11-16 PROCEDURE — 80053 COMPREHEN METABOLIC PANEL: CPT

## 2021-11-16 PROCEDURE — 74011250637 HC RX REV CODE- 250/637: Performed by: INTERNAL MEDICINE

## 2021-11-16 PROCEDURE — 74011636637 HC RX REV CODE- 636/637: Performed by: HOSPITALIST

## 2021-11-16 PROCEDURE — 85379 FIBRIN DEGRADATION QUANT: CPT

## 2021-11-16 PROCEDURE — 36415 COLL VENOUS BLD VENIPUNCTURE: CPT

## 2021-11-16 PROCEDURE — 82962 GLUCOSE BLOOD TEST: CPT

## 2021-11-16 PROCEDURE — 97116 GAIT TRAINING THERAPY: CPT

## 2021-11-16 PROCEDURE — 82728 ASSAY OF FERRITIN: CPT

## 2021-11-16 PROCEDURE — 74011250636 HC RX REV CODE- 250/636: Performed by: HOSPITALIST

## 2021-11-16 PROCEDURE — 77010033678 HC OXYGEN DAILY

## 2021-11-16 PROCEDURE — 83615 LACTATE (LD) (LDH) ENZYME: CPT

## 2021-11-16 PROCEDURE — 85025 COMPLETE CBC W/AUTO DIFF WBC: CPT

## 2021-11-16 PROCEDURE — 65660000000 HC RM CCU STEPDOWN

## 2021-11-16 PROCEDURE — 74011250636 HC RX REV CODE- 250/636: Performed by: INTERNAL MEDICINE

## 2021-11-16 RX ADMIN — Medication 10 ML: at 22:28

## 2021-11-16 RX ADMIN — ENOXAPARIN SODIUM 30 MG: 30 INJECTION SUBCUTANEOUS at 22:24

## 2021-11-16 RX ADMIN — INSULIN GLARGINE 10 UNITS: 100 INJECTION, SOLUTION SUBCUTANEOUS at 22:25

## 2021-11-16 RX ADMIN — ZINC SULFATE 220 MG (50 MG) CAPSULE 1 CAPSULE: CAPSULE at 09:50

## 2021-11-16 RX ADMIN — INSULIN LISPRO 3 UNITS: 100 INJECTION, SOLUTION INTRAVENOUS; SUBCUTANEOUS at 06:50

## 2021-11-16 RX ADMIN — INSULIN LISPRO 3 UNITS: 100 INJECTION, SOLUTION INTRAVENOUS; SUBCUTANEOUS at 13:58

## 2021-11-16 RX ADMIN — Medication 2000 UNITS: at 09:49

## 2021-11-16 RX ADMIN — Medication 10 MG: at 22:24

## 2021-11-16 RX ADMIN — INSULIN LISPRO 3 UNITS: 100 INJECTION, SOLUTION INTRAVENOUS; SUBCUTANEOUS at 18:02

## 2021-11-16 RX ADMIN — ENOXAPARIN SODIUM 30 MG: 30 INJECTION SUBCUTANEOUS at 09:49

## 2021-11-16 RX ADMIN — THIAMINE HCL TAB 100 MG 100 MG: 100 TAB at 09:50

## 2021-11-16 RX ADMIN — Medication 500 MG: at 09:49

## 2021-11-16 RX ADMIN — Medication 500 MG: at 22:24

## 2021-11-16 RX ADMIN — DEXAMETHASONE 10 MG: 4 TABLET ORAL at 22:27

## 2021-11-16 RX ADMIN — Medication 10 ML: at 06:51

## 2021-11-16 RX ADMIN — PANTOPRAZOLE SODIUM 40 MG: 40 TABLET, DELAYED RELEASE ORAL at 06:49

## 2021-11-16 RX ADMIN — INSULIN LISPRO 2 UNITS: 100 INJECTION, SOLUTION INTRAVENOUS; SUBCUTANEOUS at 06:50

## 2021-11-16 NOTE — PROGRESS NOTES
[facility]  (A Division of [address] [place] [address])                                                                                                                  Noah Perry MD  Office #: - Option # [phone]  Fax #: [phone]     Date of Admission: 11/3/2021Date of Note: 11/16/2021  Reason for FU: Evaluation and antibiotic management of CPVID 19 infection      Current Antimicrobials:    Prior Antimicrobials:  Dexamethasone 11/3 to date      Remdesivir 11/3 to date#5       Assessment- ID related:  --------------------------------------------------------------------------  · Acute hypoxic respiratory failure PaO2 50 on ABG:  Worse on HNC  CTA chest 11/3 and 11/7- no PE  SP Tociluzumab 11/11  · Viral PNA- Bilateral- Patchy infiltrate by CXR and B/L multilobular GGO on CT  · COVId 19 infection  S/P remdesvir  · Rhabdo 2/2 viral infection with transaminitis: Improving  · CoNS bacteremia- likely contaminant    COVId rapid + 11/3  Influenza A antigen : negative  Procal X3 NL- last 11/12  DD elevated to 7.84-> 2.28  CRP 12.7-> 14.5-> -> 2.2  Ferretin 22504-> 2953->-> 549  -> 379  IL 6 elevated 11/3  HIV 1/2- negative  Hepatitis C ab/ Hep B -negative. TB serology: Negative    Improving bio-markers while worsening hypoxia overnight, need further evaluation. Other Medical Issues- Mx per respective team:  · HTN  · Hyperlipidemia  · DM     Recommendation for ID issues I am following:  --------------------------------------------------------------------------  -cont dexamethasone   -wean off o2 as able    Monitor for high fevers- blood culture indicated      -> Vit complex/resp support with O2 per primary    Not much to add from ID side at this time    Will sign off. Please re-consult as needed. Subjective:  Afebrile,doing better. RANDELL RN- no issue overnight  Doing ok, smiling.  Didn't use  audio  Labs/notes reviewed        HPI:  Rebecca Flores is a 70 y.o. BLACK/ who is unvaccinated with PMH as listed below with limited communication due to [address] barrier. Admitted on 11/3 with hypoxia/symptoms of SOB and fever and body aches X4 days. Was hypoxic to 86% on RA-- apparently with muliplte family members sick with COVID 19 too. Found with fever, B/L lung infiltrate on CXR and CTA chest without PE, elevared D-dimer, high ferretin and CRP. Oxygen demand has been 4litres and is better down to 2 L at exam today. Currently on Remdesvir/dexamethasone. Stan Armas [address] from admission 1 set + for [address] [facility]. Active Hospital Problems    Diagnosis Date Noted    Acute respiratory failure due to COVID-19 [facility]) 11/03/2021    Pneumonia due to COVID-19 virus 11/03/2021    HTN (hypertension) 11/03/2021    Diabetes mellitus type 2, controlled ([facility] 11/03/2021     Past Medical History:   Diagnosis Date    Diabetes ([facility]     HTN (hypertension)     Hyperlipidemia      History reviewed. No pertinent surgical history. Family History   Problem Relation Age of Onset    Hypertension Mother      Social History     Socioeconomic History    Marital status:      Spouse name: Not on file    Number of children: Not on file    Years of education: Not on file    Highest education level: Not on file   Occupational History    Not on file   Tobacco Use    Smoking status: Never Smoker    Smokeless tobacco: Never Used   Substance and Sexual Activity    Alcohol use: Yes    Drug use: Never    Sexual activity: Not Currently   Other Topics Concern    Not on file   Social History Narrative    Not on file     Social Determinants of Health     Financial Resource Strain:     Difficulty of Paying Living Expenses: Not on file   Food Insecurity:     Worried About Running Out of Food in the Last Year: Not on file    Aston of Food in the Last Year: Not on file   Transportation Needs:     Lack of Transportation (Medical):  Not on file    Lack of Transportation (Non-Medical): Not on file   Physical Activity:     Days of Exercise per Week: Not on file    Minutes of Exercise per Session: Not on file   Stress:     Feeling of Stress : Not on file   Social Connections:     Frequency of Communication with Friends and Family: Not on file    Frequency of Social Gatherings with Friends and Family: Not on file    Attends Cheondoism Services: Not on file    Active Member of [address] [place] [address] or Organizations: Not on file    Attends Club or Organization Meetings: Not on file    Marital Status: Not on file   Intimate Partner Violence:     Fear of Current or Ex-Partner: Not on file    Emotionally Abused: Not on file    Physically Abused: Not on file    Sexually Abused: Not on file   Housing Stability:     Unable to Pay for Housing in the Last Year: Not on file    Number of [place] in the Last Year: Not on file    Unstable Housing in the Last Year: Not on file       Allergies:  Patient has no known allergies.      Medications:  Current Facility-Administered Medications   Medication Dose Route Frequency    dexAMETHasone (DECADRON) tablet 10 mg  10 mg Oral Q24H    thiamine mononitrate (B-1) tablet 100 mg  100 mg Oral DAILY    insulin lispro (HUMALOG) injection 3 Units  3 Units SubCUTAneous TIDAC    insulin glargine (LANTUS) injection 10 Units  10 Units SubCUTAneous QHS    pantoprazole (PROTONIX) tablet 40 mg  40 mg Oral ACB    loperamide (IMODIUM) capsule 4 mg  4 mg Oral Q4H PRN    sodium chloride (NS) flush 5-10 mL  5-10 mL IntraVENous PRN    enoxaparin (LOVENOX) injection 30 mg  30 mg SubCUTAneous Q12H    acetaminophen (TYLENOL) tablet 650 mg  650 mg Oral Q6H PRN    Or    acetaminophen (TYLENOL) suppository 650 mg  650 mg Rectal Q6H PRN    guaiFENesin-dextromethorphan (ROBITUSSIN DM) 100-10 mg/5 mL syrup 5 mL  5 mL Oral Q4H PRN    cholecalciferol (VITAMIN D3) (1000 Units /25 mcg) tablet 2,000 Units  2,000 Units Oral DAILY    insulin lispro (HUMALOG) injection SubCUTAneous AC&HS    glucose chewable tablet 16 g  4 Tablet Oral PRN    glucagon (GLUCAGEN) injection 1 mg  1 mg IntraMUSCular PRN    dextrose (D50W) injection syrg 12.5-25 g  25-50 mL IntraVENous PRN    sodium chloride (NS) flush 5-40 mL  5-40 mL IntraVENous Q8H    sodium chloride (NS) flush 5-40 mL  5-40 mL IntraVENous PRN    polyethylene glycol (MIRALAX) packet 17 g  17 g Oral DAILY PRN    ondansetron (ZOFRAN ODT) tablet 4 mg  4 mg Oral Q8H PRN    Or    ondansetron (ZOFRAN) injection 4 mg  4 mg IntraVENous Q6H PRN    zinc sulfate (ZINCATE) 50 mg zinc (220 mg) capsule 1 Capsule  1 Capsule Oral DAILY    ascorbic acid (vitamin C) (VITAMIN C) tablet 500 mg  500 mg Oral BID    melatonin (rapid dissolve) tablet 10 mg  10 mg Oral QHS    hydrALAZINE (APRESOLINE) 20 mg/mL injection 10 mg  10 mg IntraVENous Q6H PRN        Physical Exam:    Temp (24hrs), Av.9 °F (36.6 °C), Min:97.3 °F (36.3 °C), Max:98.5 °F (36.9 °C)    Visit Vitals  /68   Pulse 70   Temp 97.8 °F (36.6 °C)   Resp 18   Ht 5' 3\" (1.6 m)   Wt 80.2 kg (176 lb 12.9 oz)   SpO2 97%   BMI 31.32 kg/m²          GEN: WD Pbese, on her side. On O2 via NC  HEENT: Unicteric. EOMI intact  CHEST: Non laboured breathing. ABD: Obese/soft. Non tender. PEPE: Deferred  EXT: No apparent swelling or redness on UE/LE joints. Skin: Dry and intact. No rash, no redness. CNS: A, OX3. Moves all extremity. CN grossly ok. Pleasant affect     Microbiology  All Micro Results     Procedure Component Value Units Date/Time    CULTURE, BLOOD [[id number]]  (Abnormal) Collected: 21 0573    Order Status: Completed Specimen: Blood Updated: 21 1227     Special Requests: NO SPECIAL REQUESTS        GRAM STAIN       AEROBIC BOTTLE GRAM POSITIVE COCCI IN CLUSTERS                  SMEAR CALLED TO AND CORRECTLY REPEATED BY: Sarah Alonzo RN 3N BY CAM ON 21 AT [address] [place] [address].                   GRAM POSITIVE COCCI IN CLUSTERS ANAEROBIC BOTTLE                  SMEAR CALLED TO AND CORRECTLY REPEATED BY: Dustin Krueger RN 3N AT 1500 ON 11/04/2021 TO [facility]. Culture result:       STAPHYLOCOCCUS SPECIES, COAGULASE NEGATIVE MULTIPLE COLONY TYPES/STRAINS GROWING IN THE AEROBIC AND ANAEROBIC BOTTLES NO SITE INDICATED          LEGIONELLA PNEUMOPHILA AG, URINE [[id number]] Collected: 11/03/21 2045    Order Status: Canceled Specimen: Urine     STREP Gunnar Huan, URINE [[id number]] Collected: 11/03/21 2045    Order Status: Canceled Specimen: Urine     CULTURE, RESPIRATORY/SPUTUM/BRONCH Sydell Milagro STAIN [[id number]] Collected: 11/03/21 2045    Order Status: Canceled Specimen: Sputum     INFLUENZA A & B AG (RAPID TEST) [[id number]] Collected: 11/03/21 1813    Order Status: Completed Specimen: Nasopharyngeal from Nasal washing Updated: 11/03/21 1843     Influenza A Antigen Negative        Comment: A negative result does not exclude influenza virus infection, seasonal or H1N1 due to suboptimal sensitivity. If influenza is circulating in your community, a diagnosis of influenza should be considered based on a patients clinical presentation and empiric antiviral treatment should be considered, if indicated. Influenza B Antigen Negative       COVID-19 RAPID TEST [[id number]]  (Abnormal) Collected: 11/03/21 1813    Order Status: Completed Specimen: Nasopharyngeal Updated: 11/03/21 1840     Specimen source Nasopharyngeal        COVID-19 rapid test Detected        Comment:      The specimen is POSITIVE for SARS-CoV-2, the novel coronavirus associated with COVID-19. This test has been authorized by the FDA under an Emergency Use Authorization (EUA) for use by authorized laboratories. Fact sheet for Healthcare Providers: ConventionUpdate.co.nz  Fact sheet for Patients: ConventionUpdate.co.nz       Methodology: Isothermal Nucleic Acid Amplification  CALLED TO AND CORRECTLY REPEATED BY:  Ashish Brochcrystal TAY IN ER  [place] ON 11/03/2021 TO [facility].                   Lines / Catheters:  Lab results:    Chemistry  Recent Labs     11/16/21  0200 11/15/21  0545 11/14/21  0830   * 147* 131*    140 138   K 5.0 4.8 5.0    104 103   CO2 31 32 32   BUN 19* 21* 18   CREA 0.71 0.72 0.71   CA 8.3* 9.1 8.9   AGAP 2* 4 3   BUCR 27* 29* 25*   AP 89 107 101   TP 5.6* 6.8 6.3*   ALB 2.2* 2.6* 2.3*   GLOB 3.4 4.2* 4.0   AGRAT 0.6* 0.6* 0.6*       CBC w/ Diff  Recent Labs     11/16/21  0200 11/15/21  0545 11/14/21  0830   WBC 6.5 7.7 8.1   RBC 3.89* 4.66 4.22   HGB 10.5* 12.1 11.0*   HCT 35.0 41.3 37.9    397 430*   GRANS 85* 89* 87*   LYMPH 9* 8* 9*   EOS 0 0 0       Imaging: report posted below as per radiologist - reports reviewed in Epic

## 2021-11-16 NOTE — PROGRESS NOTES
Hospitalist Progress Note-critical care note     Patient: Lauren Pickard MRN: [id number]  CSN: [id number]    YOB: 1950  Age: 70 y.o. Sex: female    DOA: 11/3/2021 LOS:  LOS: 13 days            Chief complaint: covid 19 pnam, htn . DM     Assessment/Plan         Hospital Problems  Date Reviewed: 11/3/2021          Codes Class Noted POA    Acute respiratory failure due to COVID-19 [facility]) ICD-10-CM: U07.1, J96.00  ICD-9-CM: 518.81, 079.89  11/3/2021 Unknown        * (Principal) Pneumonia due to COVID-19 virus ICD-10-CM: U07.1, J12.82  ICD-9-CM: 480.8, 079.89  11/3/2021 Unknown        HTN (hypertension) ICD-10-CM: I10  ICD-9-CM: 401.9  11/3/2021 Unknown        Diabetes mellitus type 2, controlled [facility] ICD-10-CM: E11.9  ICD-9-CM: 250.00  11/3/2021 Unknown             acute respiratory failure with hypoxia   improving over the weekend   Off high flow,  Continue weaning -discussed with RN        Pneumonia due to Covid with hypoxia,  Clinically got improving   Continue iv decadron  And received  tociluzuma   Completed remdesivir  Continue supportive treatment   duplex of lower extremity neg for DVT on 11/4,11/11  lovenox BID for DVT prevention     Hyperglycemia/diabetes:   Well controlled   Continue lantus 10 and  premeal insulin ssi      Hypertension  Continue hydralazine as needed     Mild increase in liver enzymes,   Resolved   Due to viral infection   lft wnl        Pt was seen and examed in full PPE       35 total min's spent on patient care including >50% on counseling/coordinating care. Discussed the above assessments.  also discussed labs, medications and hospital course    Subjective: sob is better, I can walk around     RN as interpretor          Disposition :tbd,   Review of systems:  General: no fever /no chills   Lung :no  sob  , no wheezing   Heart : no chest pain , no palpitation   Gi: no abdomen pain, no n/v     Vital signs/Intake and Output:  Visit Vitals  /68   Pulse 70   Temp 97.8 °F (36.6 °C)   Resp 18   Ht 5' 3\" (1.6 m)   Wt 80.2 kg (176 lb 12.9 oz)   SpO2 97%   BMI 31.32 kg/m²     Current Shift:  No intake/output data recorded. Last three shifts:  11/14 1901 - 11/16 0700  In: 300 [P.O.:300]  Out: 3425 [Urine:3425]    Physical Exam:  General: WD, WN. Alert, cooperative, in distress    HEENT: NC, Atraumatic. PERRLA, anicteric sclerae. Lungs: CTA Bilaterally. No Wheezing/Rhonchi/Rales. Heart:  Regular  rhythm,  No murmur, No Rubs, No Gallops  Abdomen: Soft, Non distended, Non tender. +Bowel sounds,   Extremities: No c/c/e  Psych:   Not anxious or agitated. Neurologic:  No acute neurological deficit. Labs: Results:       Chemistry Recent Labs     11/16/21  0200 11/15/21  0545 11/14/21  0830   * 147* 131*    140 138   K 5.0 4.8 5.0    104 103   CO2 31 32 32   BUN 19* 21* 18   CREA 0.71 0.72 0.71   CA 8.3* 9.1 8.9   AGAP 2* 4 3   BUCR 27* 29* 25*   AP 89 107 101   TP 5.6* 6.8 6.3*   ALB 2.2* 2.6* 2.3*   GLOB 3.4 4.2* 4.0   AGRAT 0.6* 0.6* 0.6*      CBC w/Diff Recent Labs     11/16/21  0200 11/15/21  0545 11/14/21  0830   WBC 6.5 7.7 8.1   RBC 3.89* 4.66 4.22   HGB 10.5* 12.1 11.0*   HCT 35.0 41.3 37.9    397 430*   GRANS 85* 89* 87*   LYMPH 9* 8* 9*   EOS 0 0 0      Cardiac Enzymes No results for input(s): CPK, CKND1, ROSINA in the last 72 hours. No lab exists for component: CKRMB, TROIP   Coagulation No results for input(s): PTP, INR, APTT, INREXT, INREXT in the last 72 hours. Lipid Panel No results found for: CHOL, CHOLPOCT, CHOLX, CHLST, CHOLV, 592263, HDL, HDLP, LDL, LDLC, DLDLP, 831387, VLDLC, VLDL, TGLX, TRIGL, TRIGP, TGLPOCT, CHHD, CHHDX   BNP No results for input(s): BNPP in the last 72 hours.    Liver Enzymes Recent Labs     11/16/21  0200   TP 5.6*   ALB 2.2*   AP 89      Thyroid Studies No results found for: T4, T3U, TSH, TSHEXT, TSHEXT     Procedures/imaging: see electronic medical records for all procedures/Xrays and details which were not copied into this note but were reviewed prior to creation of Plan    CTA CHEST W OR W WO CONT    Result Date: 11/8/2021  EXAM: CTA chest INDICATION: Pain. COMPARISON: November 3, 2021. TECHNIQUE: Axial CT imaging from the thoracic inlet through the diaphragm with intravenous contrast. Coronal and sagittal MIP reformats were generated. Dose reduction techniques used: automated exposure control, adjustment of the mAs and/or kVp according to patient size, and iterative reconstruction techniques. Digital imaging and communications in Medicine (DICOM) format image data are available to nonaffiliated external healthcare facilities or entities on a secure, media free, reciprocally searchable basis with patient authorization for at least 12 months after this study. _______________ FINDINGS: EXAM QUALITY: Adequate. PULMONARY ARTERIES: No evidence of pulmonary embolism. MEDIASTINUM: Normal heart size. No evidence of right heart strain. Aorta is unremarkable. No pericardial effusion. LUNGS: There are again seen numerous bilateral multilobar groundglass infiltrates. PLEURA: There are small bilateral pleural effusions. AIRWAY: Normal. LYMPH NODES: No enlarged nodes. UPPER ABDOMEN: Unremarkable. OTHER: No acute or aggressive osseous abnormalities identified. _______________     1. No evidence of pulmonary embolism. 2.  Numerous bilateral multilobar groundglass infiltrates again seen. 3. Small bilateral pleural effusions. CTA CHEST W OR W WO CONT    Result Date: 11/3/2021  EXAM: CTA chest CLINICAL INDICATION/HISTORY: COVID pneumonia, shortness of breath COMPARISON: Chest radiograph from the same day TECHNIQUE: Axial CT imaging from the thoracic inlet through the diaphragm with intravenous contrast. Coronal and sagittal MIP reformats were generated.  One or more dose reduction techniques were used on this CT: automated exposure control, adjustment of the mAs and/or kVp according to patient size, and iterative reconstruction techniques. The specific techniques used on this CT exam have been documented in the patient's electronic medical record. Digital Imaging and Communications in Medicine (DICOM) format image data are available to nonaffiliated external healthcare facilities or entities on a secured, media free, reciprocally searchable basis with patient authorization for at least a 12-month period after this study. _______________ FINDINGS: EXAM QUALITY: Quit PULMONARY ARTERIES: No pulmonary embolism identified. MEDIASTINUM: Normal heart size. Aorta is unremarkable. No pericardial effusion. LUNGS: Bilateral multilobar groundglass infiltrates secondary to COVID 19 pneumonia. AIRWAY: Normal. PLEURA: Normal. LYMPH NODES: No enlarged nodes. UPPER ABDOMEN: Unremarkable. BONES: No acute or aggressive osseous abnormalities identified. OTHER: None. _______________     1. No pulmonary embolism identified. 2. Bilateral multilobar groundglass infiltrates secondary to COVID 19 pneumonia. US ABD LTD    Result Date: 11/4/2021  EXAM: Limited right upper quadrant abdominal ultrasound INDICATION: Elevated liver function tests. COMPARISON: CT angiogram of the chest 11/3/2021. TECHNIQUE: Real-time abdomen/right upper quadrant sonography in multiple planes was performed with image documentation. Grayscale, color flow Doppler imaging, and velocity spectral waveform analysis of the portal vein was performed (duplex imaging). _______________ FINDINGS: LIVER: Mildly increased hepatic parenchymal echogenicity is present. No focal mass Color flow Doppler and velocity spectral waveform analysis of the portal vein shows normal (hepatopedal) direction of flow. Amado Watt BILIARY SYSTEM: No intrahepatic biliary dilatation. Common bile duct is normal in caliber measuring 0.4 cm. GALLBLADDER: No gallstones or gallbladder wall thickening. No pericholecystic fluid. RIGHT KIDNEY: 8.6 cm in length. No hydronephrosis or renal mass. No visible calculi. PANCREAS: Head and body are unremarkable in appearance though the tail is obscured by overlying bowel gas. IVC: Visualized portions are unremarkable in appearance. OTHER: No free intraperitoneal fluid. _______________     Mildly increased hepatic parenchymal echotexture which can be seen in the context of steatosis and/or hepatocellular dysfunction. No biliary ductal dilatation or mass lesion. XR CHEST PORT    Result Date: 11/7/2021  EXAM: XR CHEST PORT CLINICAL INDICATION/HISTORY: Worsening hypoxia   > Additional: None. COMPARISON: November 3, 2021 TECHNIQUE: Portable chest _______________ FINDINGS: SUPPORT DEVICES: None. HEART AND MEDIASTINUM: The heart is stable in size and contour. LUNGS AND PLEURAL SPACES: Extensive multifocal airspace opacities and consolidation are seen diffusely throughout the lungs little interval change. BONY THORAX AND SOFT TISSUES: No acute osseous abnormality. _______________     Extensive multifocal airspace opacities similar to prior study. XR CHEST PORT    Result Date: 11/3/2021  EXAM: CHEST RADIOGRAPH CLINICAL INDICATION/HISTORY: meets SIRS criteria   > Additional: Short of breath and cough COMPARISON: None TECHNIQUE: Portable frontal view of the chest _______________ FINDINGS: SUPPORT DEVICES: None. HEART AND MEDIASTINUM: Heart size is normal. LUNGS AND PLEURAL SPACES: Patchy bilateral consolidations. No pleural effusion or pneumothorax. BONES AND SOFT TISSUES: Unremarkable. _______________     Patchy bilateral consolidations could relate to COVID 19 pneumonia. ECHO ADULT COMPLETE    Result Date: 11/4/2021  · Left Ventricle: Normal cavity size, wall thickness and systolic function (ejection fraction normal). The estimated EF is 60 - 65%. There is mild (grade 1) left ventricular diastolic dysfunction. Wall Scoring: The left ventricular wall motion is normal. · Tricuspid Valve: Normal valve structure and no stenosis.  Tricuspid regurgitation is inadequate for estimation of right ventricular systolic pressure. DUPLEX LOWER EXT VENOUS BILAT    Result Date: 11/5/2021  · No evidence of deep vein thrombosis in the right lower extremity. · No evidence of deep vein thrombosis in the left lower extremity.         Isa Smieon MD

## 2021-11-16 NOTE — PROGRESS NOTES
Problem: Mobility Impaired (Adult and Pediatric)  Goal: *Acute Goals and Plan of Care (Insert Text)  Description: Initiated 11/6/2021 and to be accomplished within 7 day(s)  1. Patient will move from supine to sit and sit to supine  in bed with modified independence. 2.  Patient will transfer from bed to chair and chair to bed with modified independence using the least restrictive device. 3.  Patient will perform sit to stand with modified independence. 4.  Patient will ambulate with modified independence for 150 feet with the least restrictive device. Outcome: Progressing Towards Goal   PHYSICAL THERAPY TREATMENT    Patient: Kem Harris (34 y.o. female)  Date: 11/16/2021  Diagnosis: Pneumonia due to COVID-19 virus [U07.1, J12.82]  Acute respiratory failure due to COVID-19 (HCC) [U07.1, J96.00]   Pneumonia due to COVID-19 virus    Precautions: Contact, Fall  PLOF:     ASSESSMENT:  Pt speaking a foreign language, uses hand gestures to communicate (notified nurse and she stated the pt speaks Yoselyn Pineda). No assistance needed for bed mobility or sit to stand. Ambulated 40ft in room with RW, steady pace, no LOB or path deviations. SpO2 85% with supplemental O2, quickly increased to 97% with deep breathing technique. Pt left sitting EOB, phone rang and pt again speaking in a foreign language. Progression toward goals:   []      Improving appropriately and progressing toward goals  [x]      Improving slowly and progressing toward goals  []      Not making progress toward goals and plan of care will be adjusted     PLAN:  Patient continues to benefit from skilled intervention to address the above impairments. Continue treatment per established plan of care.   Discharge Recommendations:  Home Health vs None  Further Equipment Recommendations for Discharge:  TBD     SUBJECTIVE:   Patient stated .    OBJECTIVE DATA SUMMARY:   Critical Behavior:  Neurologic State: Alert, Appropriate for age  Orientation Level: Oriented X4    Safety/Judgement: Decreased awareness of need for safety, Fall prevention  Functional Mobility Training:  Bed Mobility:    Supine to Sit: Independent  Transfers:  Sit to Stand: Supervision  Stand to Sit: Supervision  Balance:  Sitting: Intact  Standing: Intact; With support  Standing - Static: Good  Standing - Dynamic : Good   Ambulation/Gait Training:  Distance (ft): 40 Feet (ft)  Assistive Device: Gait belt; Walker, rolling  Ambulation - Level of Assistance: Supervision        Pain:  Pain level pre-treatment: 0/10  Pain level post-treatment: 0/10   Pain Intervention(s): Medication (see MAR); Rest, Ice, Repositioning   Response to intervention: Nurse notified, See doc flow    Activity Tolerance:   fair  Please refer to the flowsheet for vital signs taken during this treatment. After treatment:   [] Patient left in no apparent distress sitting up in chair  [x] Patient left in no apparent distress in bed  [x] Call bell left within reach  [x] Nursing notified  [] Caregiver present  [] Bed alarm activated  [] SCDs applied      COMMUNICATION/EDUCATION:   [x]         Role of Physical Therapy in the acute care setting. []         Fall prevention education was provided and the patient/caregiver indicated understanding. []         Patient/family have participated as able in working toward goals and plan of care. []         Patient/family agree to work toward stated goals and plan of care. []         Patient understands intent and goals of therapy, but is neutral about his/her participation.   []         Patient is unable to participate in stated goals/plan of care: ongoing with therapy staff.  []         Other:        Chris Ramos PTA   Time Calculation: 15 mins

## 2021-11-16 NOTE — PROGRESS NOTES
Palliative Medicine    CODE STATUS: FULL CODE    AMD Status: no AMD on file. Her legal next of kin is her , Constantino Ewing, who was discharged 11/15/2021 to a rehab facility. He is non-English speaking. The next legal next of kin is her daughter, Anna Sparrow. 11/16/2021 1005 Seen today in room 350. Sitting on side of bed. Had just completed 100% of breakfast. (Video  not used) States she is [place]" and not having pain. Awake, alert. Respirations unlabored. Oxygen on at 5 lpm per [place] . Able to speak in full  sentences. Pain--denies    1220: message left for Anna Sparrow requesting a call back. [phone]: return call from Shelby (son-in-law) Brief medical update given. Asked that he have Anna Sparrow call when she is able. [phone]: return call from Anna Sparrow. Brief medical update given. Explained that she is the default decision maker in the event of an emergency as her father is a patient at another facility and, with the language barrier, it may be difficult to contact him and get emergent decisions. She agreed to act as emergency decision maker if needed. Disposition plan: anticipate DC home per PT recommendations. Nursing has been weaning oxygen as tolerated    Palliative care will continue to follow Fátima Morales  and her family during her hospitalization and support them as they make healthcare decisions and define goals of care.       Roxana Hung RN, MSN  Palliative Medicine  P: [phone]

## 2021-11-17 LAB
ALBUMIN SERPL-MCNC: 2.2 G/DL (ref 3.4–5)
ALBUMIN/GLOB SERPL: 0.6 {RATIO} (ref 0.8–1.7)
ALP SERPL-CCNC: 101 U/L (ref 45–117)
ALT SERPL-CCNC: 39 U/L (ref 13–56)
ANION GAP SERPL CALC-SCNC: 4 MMOL/L (ref 3–18)
AST SERPL-CCNC: 20 U/L (ref 10–38)
BASOPHILS # BLD: 0 K/UL (ref 0–0.1)
BASOPHILS NFR BLD: 0 % (ref 0–2)
BILIRUB SERPL-MCNC: 0.4 MG/DL (ref 0.2–1)
BUN SERPL-MCNC: 18 MG/DL (ref 7–18)
BUN/CREAT SERPL: 26 (ref 12–20)
CALCIUM SERPL-MCNC: 8.4 MG/DL (ref 8.5–10.1)
CHLORIDE SERPL-SCNC: 103 MMOL/L (ref 100–111)
CO2 SERPL-SCNC: 30 MMOL/L (ref 21–32)
CREAT SERPL-MCNC: 0.69 MG/DL (ref 0.6–1.3)
D DIMER PPP FEU-MCNC: 0.91 UG/ML(FEU)
DIFFERENTIAL METHOD BLD: ABNORMAL
EOSINOPHIL # BLD: 0 K/UL (ref 0–0.4)
EOSINOPHIL NFR BLD: 0 % (ref 0–5)
ERYTHROCYTE [DISTWIDTH] IN BLOOD BY AUTOMATED COUNT: 14.5 % (ref 11.6–14.5)
FERRITIN SERPL-MCNC: 408 NG/ML (ref 8–388)
GLOBULIN SER CALC-MCNC: 3.6 G/DL (ref 2–4)
GLUCOSE BLD STRIP.AUTO-MCNC: 117 MG/DL (ref 70–110)
GLUCOSE BLD STRIP.AUTO-MCNC: 135 MG/DL (ref 70–110)
GLUCOSE BLD STRIP.AUTO-MCNC: 150 MG/DL (ref 70–110)
GLUCOSE BLD STRIP.AUTO-MCNC: 174 MG/DL (ref 70–110)
GLUCOSE SERPL-MCNC: 195 MG/DL (ref 74–99)
HCT VFR BLD AUTO: 34.3 % (ref 35–45)
HGB BLD-MCNC: 10 G/DL (ref 12–16)
IMM GRANULOCYTES # BLD AUTO: 0.1 K/UL (ref 0–0.04)
IMM GRANULOCYTES NFR BLD AUTO: 1 % (ref 0–0.5)
LDH SERPL L TO P-CCNC: 249 U/L (ref 81–234)
LYMPHOCYTES # BLD: 0.6 K/UL (ref 0.9–3.6)
LYMPHOCYTES NFR BLD: 9 % (ref 21–52)
MCH RBC QN AUTO: 25.8 PG (ref 24–34)
MCHC RBC AUTO-ENTMCNC: 29.2 G/DL (ref 31–37)
MCV RBC AUTO: 88.4 FL (ref 78–100)
MONOCYTES # BLD: 0.2 K/UL (ref 0.05–1.2)
MONOCYTES NFR BLD: 3 % (ref 3–10)
NEUTS SEG # BLD: 6.1 K/UL (ref 1.8–8)
NEUTS SEG NFR BLD: 88 % (ref 40–73)
NRBC # BLD: 0.02 K/UL (ref 0–0.01)
NRBC BLD-RTO: 0.3 PER 100 WBC
PLATELET # BLD AUTO: 343 K/UL (ref 135–420)
PMV BLD AUTO: 11.1 FL (ref 9.2–11.8)
POTASSIUM SERPL-SCNC: 5 MMOL/L (ref 3.5–5.5)
PROT SERPL-MCNC: 5.8 G/DL (ref 6.4–8.2)
RBC # BLD AUTO: 3.88 M/UL (ref 4.2–5.3)
SODIUM SERPL-SCNC: 137 MMOL/L (ref 136–145)
WBC # BLD AUTO: 6.9 K/UL (ref 4.6–13.2)

## 2021-11-17 PROCEDURE — 65660000000 HC RM CCU STEPDOWN

## 2021-11-17 PROCEDURE — 85025 COMPLETE CBC W/AUTO DIFF WBC: CPT

## 2021-11-17 PROCEDURE — 74011636637 HC RX REV CODE- 636/637: Performed by: INTERNAL MEDICINE

## 2021-11-17 PROCEDURE — 36415 COLL VENOUS BLD VENIPUNCTURE: CPT

## 2021-11-17 PROCEDURE — 83615 LACTATE (LD) (LDH) ENZYME: CPT

## 2021-11-17 PROCEDURE — 85379 FIBRIN DEGRADATION QUANT: CPT

## 2021-11-17 PROCEDURE — 74011250637 HC RX REV CODE- 250/637: Performed by: INTERNAL MEDICINE

## 2021-11-17 PROCEDURE — 74011250636 HC RX REV CODE- 250/636: Performed by: INTERNAL MEDICINE

## 2021-11-17 PROCEDURE — 82962 GLUCOSE BLOOD TEST: CPT

## 2021-11-17 PROCEDURE — 74011636637 HC RX REV CODE- 636/637: Performed by: HOSPITALIST

## 2021-11-17 PROCEDURE — 74011250636 HC RX REV CODE- 250/636: Performed by: HOSPITALIST

## 2021-11-17 PROCEDURE — 80053 COMPREHEN METABOLIC PANEL: CPT

## 2021-11-17 PROCEDURE — 77010033678 HC OXYGEN DAILY

## 2021-11-17 PROCEDURE — 97116 GAIT TRAINING THERAPY: CPT

## 2021-11-17 PROCEDURE — 82728 ASSAY OF FERRITIN: CPT

## 2021-11-17 RX ORDER — DEXAMETHASONE 4 MG/1
6 TABLET ORAL EVERY 24 HOURS
Status: DISCONTINUED | OUTPATIENT
Start: 2021-11-17 | End: 2021-11-18

## 2021-11-17 RX ORDER — INSULIN GLARGINE 100 [IU]/ML
8 INJECTION, SOLUTION SUBCUTANEOUS
Status: DISCONTINUED | OUTPATIENT
Start: 2021-11-17 | End: 2021-11-18 | Stop reason: HOSPADM

## 2021-11-17 RX ORDER — INSULIN LISPRO 100 [IU]/ML
2 INJECTION, SOLUTION INTRAVENOUS; SUBCUTANEOUS
Status: DISCONTINUED | OUTPATIENT
Start: 2021-11-17 | End: 2021-11-18 | Stop reason: HOSPADM

## 2021-11-17 RX ADMIN — INSULIN LISPRO 2 UNITS: 100 INJECTION, SOLUTION INTRAVENOUS; SUBCUTANEOUS at 16:53

## 2021-11-17 RX ADMIN — INSULIN LISPRO 3 UNITS: 100 INJECTION, SOLUTION INTRAVENOUS; SUBCUTANEOUS at 06:47

## 2021-11-17 RX ADMIN — INSULIN LISPRO 2 UNITS: 100 INJECTION, SOLUTION INTRAVENOUS; SUBCUTANEOUS at 22:41

## 2021-11-17 RX ADMIN — THIAMINE HCL TAB 100 MG 100 MG: 100 TAB at 09:32

## 2021-11-17 RX ADMIN — Medication 10 ML: at 16:53

## 2021-11-17 RX ADMIN — ENOXAPARIN SODIUM 30 MG: 30 INJECTION SUBCUTANEOUS at 09:32

## 2021-11-17 RX ADMIN — ZINC SULFATE 220 MG (50 MG) CAPSULE 1 CAPSULE: CAPSULE at 09:32

## 2021-11-17 RX ADMIN — Medication 500 MG: at 09:32

## 2021-11-17 RX ADMIN — INSULIN LISPRO 2 UNITS: 100 INJECTION, SOLUTION INTRAVENOUS; SUBCUTANEOUS at 06:46

## 2021-11-17 RX ADMIN — DEXAMETHASONE 6 MG: 4 TABLET ORAL at 22:40

## 2021-11-17 RX ADMIN — Medication 500 MG: at 22:40

## 2021-11-17 RX ADMIN — ENOXAPARIN SODIUM 30 MG: 30 INJECTION SUBCUTANEOUS at 22:39

## 2021-11-17 RX ADMIN — Medication 10 ML: at 22:42

## 2021-11-17 RX ADMIN — INSULIN GLARGINE 8 UNITS: 100 INJECTION, SOLUTION SUBCUTANEOUS at 22:40

## 2021-11-17 RX ADMIN — INSULIN LISPRO 3 UNITS: 100 INJECTION, SOLUTION INTRAVENOUS; SUBCUTANEOUS at 12:24

## 2021-11-17 RX ADMIN — Medication 10 MG: at 22:40

## 2021-11-17 RX ADMIN — Medication 10 ML: at 06:46

## 2021-11-17 RX ADMIN — Medication 2000 UNITS: at 09:32

## 2021-11-17 RX ADMIN — PANTOPRAZOLE SODIUM 40 MG: 40 TABLET, DELAYED RELEASE ORAL at 06:46

## 2021-11-17 NOTE — PROGRESS NOTES
Problem: Falls - Risk of  Goal: *Absence of Falls  Description: Document Petr Murillo Fall Risk and appropriate interventions in the flowsheet.   Outcome: Progressing Towards Goal  Note: Fall Risk Interventions:  Mobility Interventions: Patient to call before getting OOB         Medication Interventions: Patient to call before getting OOB    Elimination Interventions: Call light in reach

## 2021-11-17 NOTE — PROGRESS NOTES
Problem: Risk for Spread of Infection  Goal: Prevent transmission of infectious organism to others  Description: Prevent the transmission of infectious organisms to other patients, staff members, and visitors. Outcome: Progressing Towards Goal     Problem: Patient Education:  Go to Education Activity  Goal: Patient/Family Education  Outcome: Progressing Towards Goal     Problem: Airway Clearance - Ineffective  Goal: Achieve or maintain patent airway  Outcome: Progressing Towards Goal     Problem: Gas Exchange - Impaired  Goal: Absence of hypoxia  Outcome: Progressing Towards Goal  Goal: Promote optimal lung function  Outcome: Progressing Towards Goal     Problem: Breathing Pattern - Ineffective  Goal: Ability to achieve and maintain a regular respiratory rate  Outcome: Progressing Towards Goal     Problem:  Body Temperature -  Risk of, Imbalanced  Goal: Ability to maintain a body temperature within defined limits  Outcome: Progressing Towards Goal  Goal: Will regain or maintain usual level of consciousness  Outcome: Progressing Towards Goal  Goal: Complications related to the disease process, condition or treatment will be avoided or minimized  Outcome: Progressing Towards Goal     Problem: Isolation Precautions - Risk of Spread of Infection  Goal: Prevent transmission of infectious organism to others  Outcome: Progressing Towards Goal     Problem: Nutrition Deficits  Goal: Optimize nutrtional status  Outcome: Progressing Towards Goal     Problem: Risk for Fluid Volume Deficit  Goal: Maintain normal heart rhythm  Outcome: Progressing Towards Goal  Goal: Maintain absence of muscle cramping  Outcome: Progressing Towards Goal  Goal: Maintain normal serum potassium, sodium, calcium, phosphorus, and pH  Outcome: Progressing Towards Goal     Problem: Loneliness or Risk for Loneliness  Goal: Demonstrate positive use of time alone when socialization is not possible  Outcome: Progressing Towards Goal     Problem: Fatigue  Goal: Verbalize increase energy and improved vitality  Outcome: Progressing Towards Goal     Problem: Patient Education: Go to Patient Education Activity  Goal: Patient/Family Education  Outcome: Progressing Towards Goal     Problem: Falls - Risk of  Goal: *Absence of Falls  Description: Document Gregory Alma Fall Risk and appropriate interventions in the flowsheet.   Outcome: Progressing Towards Goal  Note: Fall Risk Interventions:  Mobility Interventions: Patient to call before getting OOB         Medication Interventions: Teach patient to arise slowly    Elimination Interventions: Call light in reach              Problem: Patient Education: Go to Patient Education Activity  Goal: Patient/Family Education  Outcome: Progressing Towards Goal     Problem: Patient Education: Go to Patient Education Activity  Goal: Patient/Family Education  Outcome: Progressing Towards Goal     Problem: Patient Education: Go to Patient Education Activity  Goal: Patient/Family Education  Outcome: Progressing Towards Goal

## 2021-11-17 NOTE — PROGRESS NOTES
Comprehensive Nutrition Assessment    Type and Reason for Visit: Reassess    Nutrition Recommendations/Plan: Continue current diet and POC. Nutrition Assessment:  PMHx: HTN, T2DM, hyperlipidemia. Came in w/ worsening cough, SOB, fever, and after several children and grandchildren sick with COVID-19- tests positive and dx given for PNA. Currently receiving oxygen via [place]- on 2.5L- per nuse note, weaning as tolerated. Last BM 11/15 (x2 days). Labs- glucose (195) + recent POC tests (avg.130), Ca (8.4) L. Meds- miralax, zofran, humalog, vit C 500mg, zinc, imodium, lantus, thiamine. Estimated Daily Nutrient Needs:  Energy (kcal): 1568; Weight Used for Energy Requirements: Ideal  Protein (g): 80; Weight Used for Protein Requirements: Current (1g)  Fluid (ml/day): 1568; Method Used for Fluid Requirements: 1 ml/kcal      Nutrition Related Findings:  Current diet regular/controlled carbohydrate with 60 gm/meal. Minimal PO documented for past week- 2/4 are >50%. However, multiple nursing notes from 11/12, 11/13, 11/15, and 11/16 state pt's appetite is \"good,\" pt is finishing meals, or note that pt ate 100% of breakfast. Pt has gained 1.91% in past week. Spoke to nurse- stated pt has been eating \"okay\" and eating most of her meals. No concerns with intake. Wounds:    None       Current Nutrition Therapies:  ADULT DIET Regular; 4 carb choices (60 gm/meal)    Anthropometric Measures:  Height:  5' 3\" (160 cm)  Current Body Wt:  80.2 kg (176 lb 12.9 oz)   Admission Body Wt:  179 lb (stated)    Usual Body Wt:   (no recent wt hx in chart)     Ideal Body Wt:  115 lbs:  153.7 %   BMI Category: Overweight (BMI 25.0-29. 9)       Nutrition Diagnosis:   Overweight/obesity related to excessive energy intake as evidenced by BMI    Nutrition Interventions:   Food and/or Nutrient Delivery: Continue current diet  Nutrition Education and Counseling: No recommendations at this time  Coordination of Nutrition Care: Continue to monitor while inpatient    Goals:  Pt to continue with PO intake >50% for most meals throughout the next 5-7 days. Pt will maintain or lose 1-2 lbs of weight throughout the next 5-7 days.        Nutrition Monitoring and Evaluation:   Behavioral-Environmental Outcomes: None identified  Food/Nutrient Intake Outcomes: Food and nutrient intake  Physical Signs/Symptoms Outcomes: Biochemical data, Meal time behavior, GI status, Weight, Skin    Discharge Planning:    Continue current diet     Electronically signed by Allen Caballero RD on 11/17/2021 at 11:20 AM

## 2021-11-17 NOTE — PROGRESS NOTES
Problem: Mobility Impaired (Adult and Pediatric)  Goal: *Acute Goals and Plan of Care (Insert Text)  Description: Initiated 11/6/2021 and to be accomplished within 7 day(s)  1. Patient will move from supine to sit and sit to supine  in bed with modified independence. 2.  Patient will transfer from bed to chair and chair to bed with modified independence using the least restrictive device. 3.  Patient will perform sit to stand with modified independence. 4.  Patient will ambulate with modified independence for 150 feet with the least restrictive device. Outcome: Resolved/Met   PHYSICAL THERAPY TREATMENT AND DISCHARGE    Patient: Adalberto Baker (43 y.o. female)  Date: 11/17/2021  Diagnosis: Pneumonia due to COVID-19 virus [U07.1, J12.82]  Acute respiratory failure due to COVID-19 (HCC) [U07.1, J96.00]   Pneumonia due to COVID-19 virus    Precautions: Contact, Fall  PLOF: independent    ASSESSMENT:  Pt on RA upon arrival.  No assistance needed for bed mobility or sit to stand. Ambulated without AD in room, steady reciprocal gt pattern, no LOB or path deviations. SpO2 83% on RA s/p ambulation, deep breathing only raised it to 85%, donned nasal cannula at 2L. Pt left sitting EOB. Notified nurse. Pt is independent, no need for further PT services. PLAN:  Maximum therapeutic gains met at current level of care and patient will be discharged from physical therapy at this time.   Rationale for discharge:  [x]     Goals Achieved  []     [address]  []     Patient not participating in therapy  []     Other:  Discharge Recommendations:  None  Further Equipment Recommendations for Discharge:  N/A     SUBJECTIVE:   Patient stated .    OBJECTIVE DATA SUMMARY:   Critical Behavior:  Neurologic State: Alert  Orientation Level: Oriented X4    Safety/Judgement: Decreased awareness of need for safety, Fall prevention  Functional Mobility Training:  Bed Mobility:    Supine to Sit: Independent  Transfers:  Sit to Stand: Independent  Stand to Sit: Independent  Balance:  Sitting: Intact; Without support  Standing: Intact; Without support   Ambulation/Gait Training:  Distance (ft): 60 Feet (ft)    Ambulation - Level of Assistance: Independent        Pain:  Pain level pre-treatment: 0/10   Pain level post-treatment: 0/10   Pain Intervention(s): Medication (see MAR); Rest, Ice, Repositioning   Response to intervention: Nurse notified, See doc flow    Activity Tolerance:   good  Please refer to the flowsheet for vital signs taken during this treatment. After treatment:   [] Patient left in no apparent distress sitting up in chair  [x] Patient left in no apparent distress in bed  [x] Call bell left within reach  [x] Nursing notified  [] Caregiver present  [] Bed alarm activated  [] SCDs applied      COMMUNICATION/EDUCATION:   [x]         Role of Physical Therapy in the acute care setting. []         Fall prevention education was provided and the patient/caregiver indicated understanding. []         Patient/family have participated as able and agree with findings and recommendations. []         Patient is unable to participate in plan of care at this time.   []         Other:        Chris Ramos PTA   Time Calculation: 12 mins

## 2021-11-17 NOTE — PROGRESS NOTES
DC plan: home with [place] vs home w/ family; O2 if needed    95 20 92:  Patient is currently on 2.5L [place] O2; if needed for discharge, care manager will order. Care manager will contact family to assist with [place] needs if requested. 1300:  FOC obtained per daughter for [facility]; MD to decide if O2 for home is needed. Care Management Interventions  PCP Verified by CM:  Yes  Last Visit to PCP: 11/03/21  Mode of Transport at Discharge: Self  Transition of Care Consult (CM Consult): Discharge Planning  Support Systems: Child(ama)  Confirm Follow Up Transport: Family  The Plan for Transition of Care is Related to the Following Treatment Goals : covid 19 pneumonia  The Patient and/or Patient Representative was Provided with a Choice of Provider and Agrees with the Discharge Plan?: Yes  Name of the Patient Representative Who was Provided with a Choice of Provider and Agrees with the Discharge Plan: Lynn Franco, son  Discharge Location  Discharge Placement: Home with family assistance

## 2021-11-17 NOTE — PROGRESS NOTES
Hospitalist Progress Note-critical care note     Patient: Andrew Barrios MRN: [id number]  CSN: [id number]    YOB: 1950  Age: 70 y.o. Sex: female    DOA: 11/3/2021 LOS:  LOS: 14 days            Chief complaint: covid 19 pnam, htn . DM     Assessment/Plan         Hospital Problems  Date Reviewed: 11/3/2021          Codes Class Noted POA    Acute respiratory failure due to COVID-19 [facility]) ICD-10-CM: U07.1, J96.00  ICD-9-CM: 518.81, 079.89  11/3/2021 Unknown        * (Principal) Pneumonia due to COVID-19 virus ICD-10-CM: U07.1, J12.82  ICD-9-CM: 480.8, 079.89  11/3/2021 Unknown        HTN (hypertension) ICD-10-CM: I10  ICD-9-CM: 401.9  11/3/2021 Unknown        Diabetes mellitus type 2, controlled ([facility] ICD-10-CM: E11.9  ICD-9-CM: 250.00  11/3/2021 Unknown             acute respiratory failure with hypoxia   Off high flow, and  Continue weaning off nc oxygen   Continue weaning -discussed with RN        Pneumonia due to Covid with hypoxia,  Clinically got improving   Continue iv decadron-will start wean steroid    And received  tociluzuma   Completed remdesivir  Continue supportive treatment   duplex of lower extremity neg for DVT on 11/4,11/11  lovenox BID for DVT prevention     Hyperglycemia/diabetes:   Well controlled   Decrease  lantus 8 and  premeal insulin ssi      Hypertension  Continue hydralazine as needed     Mild increase in liver enzymes,   Resolved   Due to viral infection   lft wnl        Pt was seen and examed in full PPE       35 total min's spent on patient care including >50% on counseling/coordinating care. Discussed the above assessments.  also discussed labs, medications and hospital course    Subjective: no sob,   Voice interpretor system used     Will d/c tomorrow if continuing doing good overnight          Disposition :tomorrow   Review of systems:  General: no fever /no chills   Lung :no  sob  , no wheezing   Heart : no chest pain , no palpitation   Gi: no abdomen pain, no n/v Vital signs/Intake and Output:  Visit Vitals  /75   Pulse 71   Temp 97.9 °F (36.6 °C)   Resp 18   Ht 5' 3\" (1.6 m)   Wt 80.2 kg (176 lb 12.9 oz)   SpO2 97%   BMI 31.32 kg/m²     Current Shift:  No intake/output data recorded. Last three shifts:  11/15 1901 - 11/17 0700  In: 240 [P.O.:240]  Out: 2000 [Urine:2000]    Physical Exam:  General: WD, WN. Alert, cooperative, in distress    HEENT: NC, Atraumatic. PERRLA, anicteric sclerae. Lungs: CTA Bilaterally. No Wheezing/Rhonchi/Rales. Heart:  Regular  rhythm,  No murmur, No Rubs, No Gallops  Abdomen: Soft, Non distended, Non tender. +Bowel sounds,   Extremities: No c/c/e  Psych:   Not anxious or agitated. Neurologic:  No acute neurological deficit. Labs: Results:       Chemistry Recent Labs     11/17/21  0445 11/16/21  0200 11/15/21  0545   * 186* 147*    139 140   K 5.0 5.0 4.8    106 104   CO2 30 31 32   BUN 18 19* 21*   CREA 0.69 0.71 0.72   CA 8.4* 8.3* 9.1   AGAP 4 2* 4   BUCR 26* 27* 29*    89 107   TP 5.8* 5.6* 6.8   ALB 2.2* 2.2* 2.6*   GLOB 3.6 3.4 4.2*   AGRAT 0.6* 0.6* 0.6*      CBC w/Diff Recent Labs     11/17/21 0445 11/16/21 0200 11/15/21  0545   WBC 6.9 6.5 7.7   RBC 3.88* 3.89* 4.66   HGB 10.0* 10.5* 12.1   HCT 34.3* 35.0 41.3    325 397   GRANS 88* 85* 89*   LYMPH 9* 9* 8*   EOS 0 0 0      Cardiac Enzymes No results for input(s): CPK, CKND1, ROSINA in the last 72 hours. No lab exists for component: CKRMB, TROIP   Coagulation No results for input(s): PTP, INR, APTT, INREXT, INREXT in the last 72 hours. Lipid Panel No results found for: CHOL, CHOLPOCT, CHOLX, CHLST, CHOLV, 645021, HDL, HDLP, LDL, LDLC, DLDLP, 817308, VLDLC, VLDL, TGLX, TRIGL, TRIGP, TGLPOCT, CHHD, CHHDX   BNP No results for input(s): BNPP in the last 72 hours.    Liver Enzymes Recent Labs     11/17/21  0445   TP 5.8*   ALB 2.2*         Thyroid Studies No results found for: T4, T3U, TSH, TSHEXT, TSHEXT Procedures/imaging: see electronic medical records for all procedures/Xrays and details which were not copied into this note but were reviewed prior to creation of Plan    CTA CHEST W OR W WO CONT    Result Date: 11/8/2021  EXAM: CTA chest INDICATION: Pain. COMPARISON: November 3, 2021. TECHNIQUE: Axial CT imaging from the thoracic inlet through the diaphragm with intravenous contrast. Coronal and sagittal MIP reformats were generated. Dose reduction techniques used: automated exposure control, adjustment of the mAs and/or kVp according to patient size, and iterative reconstruction techniques. Digital imaging and communications in Medicine (DICOM) format image data are available to nonaffiliated external healthcare facilities or entities on a secure, media free, reciprocally searchable basis with patient authorization for at least 12 months after this study. _______________ FINDINGS: EXAM QUALITY: Adequate. PULMONARY ARTERIES: No evidence of pulmonary embolism. MEDIASTINUM: Normal heart size. No evidence of right heart strain. Aorta is unremarkable. No pericardial effusion. LUNGS: There are again seen numerous bilateral multilobar groundglass infiltrates. PLEURA: There are small bilateral pleural effusions. AIRWAY: Normal. LYMPH NODES: No enlarged nodes. UPPER ABDOMEN: Unremarkable. OTHER: No acute or aggressive osseous abnormalities identified. _______________     1. No evidence of pulmonary embolism. 2.  Numerous bilateral multilobar groundglass infiltrates again seen. 3. Small bilateral pleural effusions. CTA CHEST W OR W WO CONT    Result Date: 11/3/2021  EXAM: CTA chest CLINICAL INDICATION/HISTORY: COVID pneumonia, shortness of breath COMPARISON: Chest radiograph from the same day TECHNIQUE: Axial CT imaging from the thoracic inlet through the diaphragm with intravenous contrast. Coronal and sagittal MIP reformats were generated.  One or more dose reduction techniques were used on this CT: automated exposure control, adjustment of the mAs and/or kVp according to patient size, and iterative reconstruction techniques. The specific techniques used on this CT exam have been documented in the patient's electronic medical record. Digital Imaging and Communications in Medicine (DICOM) format image data are available to nonaffiliated external healthcare facilities or entities on a secured, media free, reciprocally searchable basis with patient authorization for at least a 12-month period after this study. _______________ FINDINGS: EXAM QUALITY: Quit PULMONARY ARTERIES: No pulmonary embolism identified. MEDIASTINUM: Normal heart size. Aorta is unremarkable. No pericardial effusion. LUNGS: Bilateral multilobar groundglass infiltrates secondary to COVID 19 pneumonia. AIRWAY: Normal. PLEURA: Normal. LYMPH NODES: No enlarged nodes. UPPER ABDOMEN: Unremarkable. BONES: No acute or aggressive osseous abnormalities identified. OTHER: None. _______________     1. No pulmonary embolism identified. 2. Bilateral multilobar groundglass infiltrates secondary to COVID 19 pneumonia. US ABD LTD    Result Date: 11/4/2021  EXAM: Limited right upper quadrant abdominal ultrasound INDICATION: Elevated liver function tests. COMPARISON: CT angiogram of the chest 11/3/2021. TECHNIQUE: Real-time abdomen/right upper quadrant sonography in multiple planes was performed with image documentation. Grayscale, color flow Doppler imaging, and velocity spectral waveform analysis of the portal vein was performed (duplex imaging). _______________ FINDINGS: LIVER: Mildly increased hepatic parenchymal echogenicity is present. No focal mass Color flow Doppler and velocity spectral waveform analysis of the portal vein shows normal (hepatopedal) direction of flow. Lorean Snare BILIARY SYSTEM: No intrahepatic biliary dilatation. Common bile duct is normal in caliber measuring 0.4 cm. GALLBLADDER: No gallstones or gallbladder wall thickening. No pericholecystic fluid. RIGHT KIDNEY: 8.6 cm in length. No hydronephrosis or renal mass. No visible calculi. PANCREAS: Head and body are unremarkable in appearance though the tail is obscured by overlying bowel gas. IVC: Visualized portions are unremarkable in appearance. OTHER: No free intraperitoneal fluid. _______________     Mildly increased hepatic parenchymal echotexture which can be seen in the context of steatosis and/or hepatocellular dysfunction. No biliary ductal dilatation or mass lesion. XR CHEST PORT    Result Date: 11/7/2021  EXAM: XR CHEST PORT CLINICAL INDICATION/HISTORY: Worsening hypoxia   > Additional: None. COMPARISON: November 3, 2021 TECHNIQUE: Portable chest _______________ FINDINGS: SUPPORT DEVICES: None. HEART AND MEDIASTINUM: The heart is stable in size and contour. LUNGS AND PLEURAL SPACES: Extensive multifocal airspace opacities and consolidation are seen diffusely throughout the lungs little interval change. BONY THORAX AND SOFT TISSUES: No acute osseous abnormality. _______________     Extensive multifocal airspace opacities similar to prior study. XR CHEST PORT    Result Date: 11/3/2021  EXAM: CHEST RADIOGRAPH CLINICAL INDICATION/HISTORY: meets SIRS criteria   > Additional: Short of breath and cough COMPARISON: None TECHNIQUE: Portable frontal view of the chest _______________ FINDINGS: SUPPORT DEVICES: None. HEART AND MEDIASTINUM: Heart size is normal. LUNGS AND PLEURAL SPACES: Patchy bilateral consolidations. No pleural effusion or pneumothorax. BONES AND SOFT TISSUES: Unremarkable. _______________     Patchy bilateral consolidations could relate to COVID 19 pneumonia. ECHO ADULT COMPLETE    Result Date: 11/4/2021  · Left Ventricle: Normal cavity size, wall thickness and systolic function (ejection fraction normal). The estimated EF is 60 - 65%. There is mild (grade 1) left ventricular diastolic dysfunction. Wall Scoring:  The left ventricular wall motion is normal. · Tricuspid Valve: Normal valve structure and no stenosis. Tricuspid regurgitation is inadequate for estimation of right ventricular systolic pressure. DUPLEX LOWER EXT VENOUS BILAT    Result Date: 11/5/2021  · No evidence of deep vein thrombosis in the right lower extremity. · No evidence of deep vein thrombosis in the left lower extremity.         Antoine Newsome MD

## 2021-11-18 ENCOUNTER — HOME HEALTH ADMISSION (OUTPATIENT)
Dept: HOME HEALTH SERVICES | Facility: HOME HEALTH | Age: 71
End: 2021-11-18
Payer: MEDICARE

## 2021-11-18 VITALS
OXYGEN SATURATION: 90 % | HEIGHT: 63 IN | DIASTOLIC BLOOD PRESSURE: 70 MMHG | BODY MASS INDEX: 31.17 KG/M2 | WEIGHT: 175.9 LBS | TEMPERATURE: 97.9 F | HEART RATE: 83 BPM | SYSTOLIC BLOOD PRESSURE: 113 MMHG | RESPIRATION RATE: 18 BRPM

## 2021-11-18 LAB
ALBUMIN SERPL-MCNC: 2.2 G/DL (ref 3.4–5)
ALBUMIN/GLOB SERPL: 0.7 {RATIO} (ref 0.8–1.7)
ALP SERPL-CCNC: 82 U/L (ref 45–117)
ALT SERPL-CCNC: 35 U/L (ref 13–56)
ANION GAP SERPL CALC-SCNC: 5 MMOL/L (ref 3–18)
AST SERPL-CCNC: 23 U/L (ref 10–38)
BASOPHILS # BLD: 0 K/UL (ref 0–0.1)
BASOPHILS NFR BLD: 0 % (ref 0–2)
BILIRUB SERPL-MCNC: 0.6 MG/DL (ref 0.2–1)
BUN SERPL-MCNC: 17 MG/DL (ref 7–18)
BUN/CREAT SERPL: 25 (ref 12–20)
CALCIUM SERPL-MCNC: 8.5 MG/DL (ref 8.5–10.1)
CHLORIDE SERPL-SCNC: 104 MMOL/L (ref 100–111)
CO2 SERPL-SCNC: 29 MMOL/L (ref 21–32)
CREAT SERPL-MCNC: 0.69 MG/DL (ref 0.6–1.3)
D DIMER PPP FEU-MCNC: 0.79 UG/ML(FEU)
DIFFERENTIAL METHOD BLD: ABNORMAL
EOSINOPHIL # BLD: 0 K/UL (ref 0–0.4)
EOSINOPHIL NFR BLD: 0 % (ref 0–5)
ERYTHROCYTE [DISTWIDTH] IN BLOOD BY AUTOMATED COUNT: 14.7 % (ref 11.6–14.5)
FERRITIN SERPL-MCNC: 365 NG/ML (ref 8–388)
GLOBULIN SER CALC-MCNC: 3.3 G/DL (ref 2–4)
GLUCOSE BLD STRIP.AUTO-MCNC: 161 MG/DL (ref 70–110)
GLUCOSE BLD STRIP.AUTO-MCNC: 187 MG/DL (ref 70–110)
GLUCOSE BLD STRIP.AUTO-MCNC: 95 MG/DL (ref 70–110)
GLUCOSE SERPL-MCNC: 159 MG/DL (ref 74–99)
HCT VFR BLD AUTO: 34.2 % (ref 35–45)
HGB BLD-MCNC: 10.2 G/DL (ref 12–16)
IMM GRANULOCYTES # BLD AUTO: 0 K/UL (ref 0–0.04)
IMM GRANULOCYTES NFR BLD AUTO: 1 % (ref 0–0.5)
LDH SERPL L TO P-CCNC: 242 U/L (ref 81–234)
LYMPHOCYTES # BLD: 0.8 K/UL (ref 0.9–3.6)
LYMPHOCYTES NFR BLD: 12 % (ref 21–52)
MCH RBC QN AUTO: 27.2 PG (ref 24–34)
MCHC RBC AUTO-ENTMCNC: 29.8 G/DL (ref 31–37)
MCV RBC AUTO: 91.2 FL (ref 78–100)
MONOCYTES # BLD: 0.6 K/UL (ref 0.05–1.2)
MONOCYTES NFR BLD: 8 % (ref 3–10)
NEUTS SEG # BLD: 5.5 K/UL (ref 1.8–8)
NEUTS SEG NFR BLD: 79 % (ref 40–73)
NRBC # BLD: 0 K/UL (ref 0–0.01)
NRBC BLD-RTO: 0 PER 100 WBC
PLATELET # BLD AUTO: 282 K/UL (ref 135–420)
PMV BLD AUTO: 11.1 FL (ref 9.2–11.8)
POTASSIUM SERPL-SCNC: 4.8 MMOL/L (ref 3.5–5.5)
PROT SERPL-MCNC: 5.5 G/DL (ref 6.4–8.2)
RBC # BLD AUTO: 3.75 M/UL (ref 4.2–5.3)
SODIUM SERPL-SCNC: 138 MMOL/L (ref 136–145)
WBC # BLD AUTO: 6.9 K/UL (ref 4.6–13.2)

## 2021-11-18 PROCEDURE — 74011250636 HC RX REV CODE- 250/636: Performed by: INTERNAL MEDICINE

## 2021-11-18 PROCEDURE — 36415 COLL VENOUS BLD VENIPUNCTURE: CPT

## 2021-11-18 PROCEDURE — 83615 LACTATE (LD) (LDH) ENZYME: CPT

## 2021-11-18 PROCEDURE — 74011636637 HC RX REV CODE- 636/637: Performed by: INTERNAL MEDICINE

## 2021-11-18 PROCEDURE — 74011636637 HC RX REV CODE- 636/637: Performed by: HOSPITALIST

## 2021-11-18 PROCEDURE — 74011250636 HC RX REV CODE- 250/636: Performed by: HOSPITALIST

## 2021-11-18 PROCEDURE — 82962 GLUCOSE BLOOD TEST: CPT

## 2021-11-18 PROCEDURE — 80053 COMPREHEN METABOLIC PANEL: CPT

## 2021-11-18 PROCEDURE — 82728 ASSAY OF FERRITIN: CPT

## 2021-11-18 PROCEDURE — 85025 COMPLETE CBC W/AUTO DIFF WBC: CPT

## 2021-11-18 PROCEDURE — 74011250637 HC RX REV CODE- 250/637: Performed by: INTERNAL MEDICINE

## 2021-11-18 PROCEDURE — 85379 FIBRIN DEGRADATION QUANT: CPT

## 2021-11-18 RX ORDER — PANTOPRAZOLE SODIUM 40 MG/1
40 TABLET, DELAYED RELEASE ORAL
Qty: 10 TABLET | Refills: 0 | Status: SHIPPED | OUTPATIENT
Start: 2021-11-18

## 2021-11-18 RX ORDER — ALBUTEROL SULFATE 90 UG/1
2 AEROSOL, METERED RESPIRATORY (INHALATION)
Status: DISCONTINUED | OUTPATIENT
Start: 2021-11-18 | End: 2021-11-18 | Stop reason: SDUPTHER

## 2021-11-18 RX ORDER — CALCIUM CARB/MAGNESIUM CARB 311-232MG
10 TABLET ORAL
Qty: 5 TABLET | Refills: 0 | Status: SHIPPED | OUTPATIENT
Start: 2021-11-18

## 2021-11-18 RX ORDER — ASCORBIC ACID 500 MG
500 TABLET ORAL 2 TIMES DAILY
Qty: 10 TABLET | Refills: 0 | Status: SHIPPED | OUTPATIENT
Start: 2021-11-18 | End: 2022-01-27

## 2021-11-18 RX ORDER — DEXAMETHASONE 1 MG/1
TABLET ORAL
Qty: 10 TABLET | Refills: 0 | Status: SHIPPED | OUTPATIENT
Start: 2021-11-18 | End: 2022-01-27

## 2021-11-18 RX ORDER — ZINC SULFATE 50(220)MG
1 CAPSULE ORAL DAILY
Qty: 5 CAPSULE | Refills: 0 | Status: SHIPPED | OUTPATIENT
Start: 2021-11-19 | End: 2022-01-27

## 2021-11-18 RX ORDER — GUAIFENESIN/DEXTROMETHORPHAN 100-10MG/5
5 SYRUP ORAL
Qty: 100 ML | Refills: 0 | Status: SHIPPED | OUTPATIENT
Start: 2021-11-18 | End: 2021-11-28

## 2021-11-18 RX ORDER — ALBUTEROL SULFATE 90 UG/1
1 AEROSOL, METERED RESPIRATORY (INHALATION)
Status: DISCONTINUED | OUTPATIENT
Start: 2021-11-18 | End: 2021-11-18 | Stop reason: HOSPADM

## 2021-11-18 RX ORDER — DEXAMETHASONE 4 MG/1
6 TABLET ORAL EVERY 24 HOURS
Status: DISCONTINUED | OUTPATIENT
Start: 2021-11-18 | End: 2021-11-18 | Stop reason: HOSPADM

## 2021-11-18 RX ORDER — MELATONIN
2000 DAILY
Qty: 5 TABLET | Refills: 0 | Status: SHIPPED | OUTPATIENT
Start: 2021-11-19

## 2021-11-18 RX ADMIN — ENOXAPARIN SODIUM 30 MG: 30 INJECTION SUBCUTANEOUS at 10:32

## 2021-11-18 RX ADMIN — Medication 10 ML: at 06:55

## 2021-11-18 RX ADMIN — PANTOPRAZOLE SODIUM 40 MG: 40 TABLET, DELAYED RELEASE ORAL at 15:10

## 2021-11-18 RX ADMIN — DEXAMETHASONE 6 MG: 4 TABLET ORAL at 17:57

## 2021-11-18 RX ADMIN — INSULIN LISPRO 2 UNITS: 100 INJECTION, SOLUTION INTRAVENOUS; SUBCUTANEOUS at 10:33

## 2021-11-18 RX ADMIN — INSULIN LISPRO 2 UNITS: 100 INJECTION, SOLUTION INTRAVENOUS; SUBCUTANEOUS at 10:34

## 2021-11-18 RX ADMIN — Medication 500 MG: at 10:33

## 2021-11-18 RX ADMIN — Medication 10 ML: at 15:12

## 2021-11-18 RX ADMIN — Medication 2000 UNITS: at 10:33

## 2021-11-18 RX ADMIN — THIAMINE HCL TAB 100 MG 100 MG: 100 TAB at 10:33

## 2021-11-18 RX ADMIN — INSULIN LISPRO 2 UNITS: 100 INJECTION, SOLUTION INTRAVENOUS; SUBCUTANEOUS at 15:10

## 2021-11-18 RX ADMIN — INSULIN LISPRO 2 UNITS: 100 INJECTION, SOLUTION INTRAVENOUS; SUBCUTANEOUS at 15:11

## 2021-11-18 RX ADMIN — ZINC SULFATE 220 MG (50 MG) CAPSULE 1 CAPSULE: CAPSULE at 10:33

## 2021-11-18 NOTE — ROUTINE PROCESS
Bedside and Verbal shift change report given to Samia Avendaño RN (oncoming nurse) by Michael Kesselr RN (offgoing nurse). Report included the following information SBAR and Kardex.

## 2021-11-18 NOTE — PROGRESS NOTES
HAO notes patient is set up with [facility] and is on 1L NC at this time. CM notes patient may discharge today, will remain available for any discharge planning needs. Patient to discharge with [address] O2. Oxygen delivered to patient with instructions to call for the concentrator when the patient gets home. [facility] [address] [facility] Cm spoke with patient's son in law, [facility] is closed today and he can not  patient meds. MD notified. Care Management Interventions  PCP Verified by CM: Yes  Last Visit to PCP: 11/03/21  Mode of Transport at Discharge: Self  Transition of Care Consult (CM Consult): Discharge Planning  Support Systems: Child(ama)  Confirm Follow Up Transport: Family  The Plan for Transition of Care is Related to the Following Treatment Goals : covid 19 pneumonia  The Patient and/or Patient Representative was Provided with a Choice of Provider and Agrees with the Discharge Plan?: Yes  Name of the Patient Representative Who was Provided with a Choice of Provider and Agrees with the Discharge Plan: Garret Ruth, son  Discharge Location  Discharge Placement: Home with family assistance     Per MD, patient can discharge with albuterol inhaler, RN to clarify with MD about when to give daily dose of decadron. The other medications can be picked up tomorrow at [facility].

## 2021-11-18 NOTE — PROGRESS NOTES
carole oglesby Son-in-Law   [phone]     D/c plan discussed with him including watch for complication from covid 19.

## 2021-11-18 NOTE — DISCHARGE INSTRUCTIONS
8 Common Questions About the COVID-19 Vaccine  Here are the answers to some questions and concerns that many people ask. Why should I get a COVID-19 vaccine? It will help protect you, protect others, and end the pandemic. Getting a vaccine will help you avoid catching COVID-19. (If you do catch it, your symptoms will most likely be less severe than if you hadn't gotten the vaccine.) Getting a vaccine also helps you protect the people around you--people who could have serious problems if they catch the virus. Are COVID-19 vaccines safe? COVID-19 vaccines are safe and effective. They have been given to millions of people. The risk of serious problems is very low. Could I get COVID-19 from a vaccine? No, you can't get COVID-19 from a vaccine. The vaccines don't contain the COVID-19 virus, so they can't cause the disease. What are the side effects of COVID-19 vaccines? You might not have any side effects. If you do, they'll probably be a lot like the common side effects of other vaccines--a fever, fatigue, and soreness. (These are signs that your immune system is learning how to fight the virus.) The side effects don't last long, and they can be treated if they bother you. How many doses of a vaccine will I need? You may need 1 or 2 doses of a vaccine. And you might need \"booster\" doses later to help you stay protected. Do I need a vaccine if I've already had COVID-19? Yes. If you've had COVID-19, you may still be able to catch it again. Getting a vaccine will give you extra protection. Will I still need to wear a face mask after I get a vaccine? No and maybe. Once you are fully vaccinated, you can resume activities without wearing a mask unless required by other rules. Be sure to follow all instructions from your local health authorities and the CDC. Why not just get COVID-19 and skip a vaccine?   The risk of serious problems from the virus is much higher than the risk of serious problems from a vaccine. COVID-19 is unpredictable. Even if you're young and healthy, you could get very sick, have long-term health problems, or die. So it's much safer to get a vaccine than it is to catch COVID-19. The COVID-19 vaccines are one of the best to help stop the pandemic. So get vaccinated. Current as of: March 26, 2021               Content Version: 13.0  © 2006-2021 [employer]. Care instructions adapted under license by [employer] (which disclaims liability or warranty for this information). If you have questions about a medical condition or this instruction, always ask your healthcare professional. [place] [address] any warranty or liability for your use of this information. Patient Education        Noninsulin Medicines for Type 2 Diabetes: Care Instructions  Overview     There are different types of noninsulin medicines for diabetes. Each works in a different way. But they all help you control your blood sugar. Some types help your body make insulin to lower your blood sugar. Others lower how much insulin your body needs. Some can slow how fast your body digests sugars. And some can remove extra glucose through your urine. You may need to take more than one medicine for diabetes. Two or more medicines may work better to lower your blood sugar level than just one does. · Metformin. This lowers how much glucose your liver makes. And it helps you respond better to insulin. It also lowers the amount of stored sugar that your liver releases when you are not eating. · Sulfonylureas. These help your body release more insulin. Some work for many hours. They can cause low blood sugar if you don't eat as you planned. An example is glipizide. · Thiazolidinediones. These reduce the amount of blood glucose. They also help you respond better to insulin. An example is pioglitazone. · SGLT2 inhibitors.  These help to remove extra glucose through your urine. They may also help some people lose weight. An example is ertugliflozin. · DPP-4 inhibitors. These help your body raise the level of insulin after you eat. They also help your body make less of a hormone that raises blood sugar. An example is alogliptin. · GLP-1 receptor agonists. These help your body make a protein that can raise your insulin level and make you less hungry. They're given as shots or pills. An example is semaglutide. · Meglitinides. These help your body release insulin. They also help slow how your body digests sugars. So they can keep your blood sugar from rising too fast after you eat. · Alpha-glucosidase inhibitors. These keep starches from breaking down. This means that they lower the amount of glucose absorbed when you eat. They don't help your body make more insulin. So they will not cause low blood sugar unless you use them with other medicines for diabetes. Follow-up care is a key part of your treatment and safety. Be sure to make and go to all appointments, and call your doctor if you are having problems. It's also a good idea to know your test results and keep a list of the medicines you take. How can you care for yourself at home? · Eat a healthy diet. Get some exercise each day. This may help you to reduce how much medicine you need. · Do not take other prescription or over-the-counter medicines, vitamins, herbal products, or supplements without talking to your doctor first. Some medicines for type 2 diabetes can cause problems with other medicines or supplements. · Tell your doctor if you plan to get pregnant. Some of these drugs are not safe for pregnant women. · Be safe with medicines. Take your medicines exactly as prescribed. Meglitinides and sulfonylureas can cause your blood sugar to drop very low. Call your doctor if you think you are having a problem with your medicine. · Check your blood sugar often. You can use a glucose monitor.  Keeping track can help you know how certain foods, activities, and medicines affect your blood sugar. And it can help you keep your blood sugar from getting so low that it's not safe. When should you call for help? Call 911 anytime you think you may need emergency care. For example, call if:    · You passed out (lost consciousness).     · You are confused or cannot think clearly.     · Your blood sugar is very high or very low. Watch closely for changes in your health, and be sure to contact your doctor if:    · Your blood sugar stays outside the level your doctor set for you.     · You have any problems. Where can you learn more? Go to http://www.gray.com/  Enter H153 in the search box to learn more about \"Noninsulin Medicines for Type 2 Diabetes: Care Instructions. \"  Current as of: August 31, 2020               Content Version: 13.0  © 9668-8403 [employer]. Care instructions adapted under license by [employer] (which disclaims liability or warranty for this information). If you have questions about a medical condition or this instruction, always ask your healthcare professional. [place] [address] any warranty or liability for your use of this information. Apolinar Dandy,   Our infection disease doctor recommends that you get a COVID shot about 2 weeks after your discharge. Follow-up with your primary care doctor to check when it is right for you. Below is some information about the shot.      8 Common Questions About the COVID-19 Vaccine  Here are the answers to some questions and concerns that many people ask. Why should I get a COVID-19 vaccine? It will help protect you, protect others, and end the pandemic. Getting a vaccine will help you avoid catching COVID-19.  (If you do catch it, your symptoms will most likely be less severe than if you hadn't gotten the vaccine.) Getting a vaccine also helps you protect the people around you--people who could have serious problems if they catch the virus. Are COVID-19 vaccines safe? COVID-19 vaccines are safe and effective. They have been given to millions of people. The risk of serious problems is very low. Could I get COVID-19 from a vaccine? No, you can't get COVID-19 from a vaccine. The vaccines don't contain the COVID-19 virus, so they can't cause the disease. What are the side effects of COVID-19 vaccines? You might not have any side effects. If you do, they'll probably be a lot like the common side effects of other vaccines--a fever, fatigue, and soreness. (These are signs that your immune system is learning how to fight the virus.) The side effects don't last long, and they can be treated if they bother you. How many doses of a vaccine will I need? You may need 1 or 2 doses of a vaccine. And you might need \"booster\" doses later to help you stay protected. Do I need a vaccine if I've already had COVID-19? Yes. If you've had COVID-19, you may still be able to catch it again. Getting a vaccine will give you extra protection. Will I still need to wear a face mask after I get a vaccine? No and maybe. Once you are fully vaccinated, you can resume activities without wearing a mask unless required by other rules. Be sure to follow all instructions from your local health authorities and the CDC. Why not just get COVID-19 and skip a vaccine? The risk of serious problems from the virus is much higher than the risk of serious problems from a vaccine. COVID-19 is unpredictable. Even if you're young and healthy, you could get very sick, have long-term health problems, or die. So it's much safer to get a vaccine than it is to catch COVID-19. The COVID-19 vaccines are one of the best to help stop the pandemic. So get vaccinated. Current as of: March 26, 2021               Content Version: 13.0  © 1789-5487 [employer], [employer].    Care instructions adapted under license by [employer] (which disclaims liability or warranty for this information). If you have questions about a medical condition or this instruction, always ask your healthcare professional. [address] any warranty or liability for your use of this information.

## 2021-11-18 NOTE — DISCHARGE SUMMARY
Discharge Summary    Patient: Linnea Petty MRN: [id number]  CSN: [id number]    YOB: 1950  Age: 70 y.o. Sex: female    DOA: 11/3/2021 LOS:  LOS: 15 days   Discharge Date:      Primary Care Provider:  Other, MD Hiram    Admission Diagnoses: Pneumonia due to COVID-19 virus [U07.1, J12.82]  Acute respiratory failure due to COVID-19 ([facility] [U07.1, J96.00]    Discharge Diagnoses:    Hospital Problems  Date Reviewed: 11/3/2021          Codes Class Noted POA    Acute respiratory failure due to COVID-19 [facility]) ICD-10-CM: U07.1, J96.00  ICD-9-CM: 518.81, 079.89  11/3/2021 Unknown        * (Principal) Pneumonia due to COVID-19 virus ICD-10-CM: U07.1, J12.82  ICD-9-CM: 480.8, 079.89  11/3/2021 Unknown        HTN (hypertension) ICD-10-CM: I10  ICD-9-CM: 401.9  11/3/2021 Unknown        Diabetes mellitus type 2, controlled ([facility] ICD-10-CM: E11.9  ICD-9-CM: 250.00  11/3/2021 Unknown              Discharge Condition: stable     Discharge Medications:     Current Discharge Medication List      START taking these medications    Details   ascorbic acid, vitamin C, (VITAMIN C) 500 mg tablet Take 1 Tablet by mouth two (2) times a day. Qty: 10 Tablet, Refills: 0  Start date: 11/18/2021      cholecalciferol (VITAMIN D3) (1000 Units /25 mcg) tablet Take 2 Tablets by mouth daily. Qty: 5 Tablet, Refills: 0  Start date: 11/19/2021      dexAMETHasone (DECADRON) 1 mg tablet Take 4 tabs po once a day for 2 days, then 2 tabs once daily for 2 days, then once daily for 2 days , then stop  Qty: 10 Tablet, Refills: 0  Start date: 11/18/2021      guaiFENesin-dextromethorphan (ROBITUSSIN DM) 100-10 mg/5 mL syrup Take 5 mL by mouth every four (4) hours as needed for Cough for up to 10 days. Qty: 100 mL, Refills: 0  Start date: 11/18/2021, End date: 11/28/2021      melatonin, rapid dissolve, 5 mg TbDi tablet Take 2 Tablets by mouth nightly.   Qty: 5 Tablet, Refills: 0  Start date: 11/18/2021      pantoprazole (PROTONIX) 40 mg tablet Take 1 Tablet by mouth Daily (before breakfast). Qty: 10 Tablet, Refills: 0  Start date: 11/18/2021      zinc sulfate (ZINCATE) 50 mg zinc (220 mg) capsule Take 1 Capsule by mouth daily. Qty: 5 Capsule, Refills: 0  Start date: 11/19/2021      albuterol sulfate (PROAIR RESPICLICK) 90 mcg/actuation breath activated inhaler Take 1-2 Puffs by inhalation every four (4) hours as needed for Wheezing, Shortness of Breath or Respiratory Distress. Qty: 1 Each, Refills: 0  Start date: 11/18/2021             Procedures : none     Consults: Infectious Disease      PHYSICAL EXAM   Visit Vitals  /69 (BP 1 Location: Right upper arm, BP Patient Position: Sitting)   Pulse 90   Temp 97.7 °F (36.5 °C)   Resp 18   Ht 5' 3\" (1.6 m)   Wt 79.8 kg (175 lb 14.4 oz)   SpO2 90%   BMI 31.16 kg/m²     General: Awake, cooperative, no acute distress    HEENT: NC, Atraumatic. PERRLA, EOMI. Anicteric sclerae. Lungs:  CTA Bilaterally. No Wheezing/Rhonchi/Rales. Heart:  Regular  rhythm,  No murmur, No Rubs, No Gallops  Abdomen: Soft, Non distended, Non tender. +Bowel sounds,   Extremities: No c/c/e  Psych:   Not anxious or agitated. Neurologic:  No acute neurological deficits. Admission HPI :   Abiodun Baptiste is a 70 y.o.  female from [facility] non-English speaking who has history of hypertension, hyperlipidemia and diabetes presents to the emergency room with worsening cough shortness of breath and fever for the last 4 days she has several grandchildren and children were sick with Covid. She is unvaccinated. In the emergency room she was found to have sats of 88% which improved with 5 L of oxygen. CTA of the chest was done and was negative for pulmonary embolism.   History is obtained from patient,  and her son-in-law    Hospital Course :   Abiodun Baptiste is a 70 y.o.  female from [facility] non-English speaking who has history of hypertension, hyperlipidemia and diabetes is admitted to acute respiratory failure s/p coivd 19 PNA. She was transferred to icu due to worsening sob. She was put on high flow oxygen and ID was on board, she received iv decadron and tociluzuma and she completed remdesivir. Duplex of lower extremity neg for DVT on 11/4,11/11 and repeated ct chest no PE. With the treatment, she is off high flow, and on 0-1 L on discharge. Before discharge, recommended self quarantine, also discussed with patient about watching for the complication from   Covid infection such as stroke/ blood clots and heart attack. Discussed with Patient and son -in law   Mirtha Phillips Son-in-Law     [phone]     ,they both  indicated verbal understanding. Discharge planning discussed with patient, pt agrees  with the plan and no questions and concerns at this point. Activity: Activity as tolerated    Diet: Diabetic Diet    Follow-up: PCP     Disposition: home     Minutes spent on discharge: 45 min       Labs: Results:       Chemistry Recent Labs     11/18/21 0305 11/17/21 0445 11/16/21  0200   * 195* 186*    137 139   K 4.8 5.0 5.0    103 106   CO2 29 30 31   BUN 17 18 19*   CREA 0.69 0.69 0.71   CA 8.5 8.4* 8.3*   AGAP 5 4 2*   BUCR 25* 26* 27*   AP 82 101 89   TP 5.5* 5.8* 5.6*   ALB 2.2* 2.2* 2.2*   GLOB 3.3 3.6 3.4   AGRAT 0.7* 0.6* 0.6*      CBC w/Diff Recent Labs     11/18/21 0305 11/17/21 0445 11/16/21  0200   WBC 6.9 6.9 6.5   RBC 3.75* 3.88* 3.89*   HGB 10.2* 10.0* 10.5*   HCT 34.2* 34.3* 35.0    343 325   GRANS 79* 88* 85*   LYMPH 12* 9* 9*   EOS 0 0 0      Cardiac Enzymes No results for input(s): CPK, CKND1, ROSINA in the last 72 hours. No lab exists for component: CKRMB, TROIP   Coagulation No results for input(s): PTP, INR, APTT, INREXT in the last 72 hours.     Lipid Panel No results found for: CHOL, CHOLPOCT, CHOLX, CHLST, CHOLV, 798928, HDL, HDLP, LDL, LDLC, DLDLP, 495324, VLDLC, VLDL, TGLX, TRIGL, TRIGP, TGLPOCT, CHHD, CHHDX   BNP No results for input(s): BNPP in the last 72 hours. Liver Enzymes Recent Labs     11/18/21  0305   TP 5.5*   ALB 2.2*   AP 82      Thyroid Studies No results found for: T4, T3U, TSH, TSHEXT           Significant Diagnostic Studies: CTA CHEST W OR W WO CONT    Result Date: 11/8/2021  EXAM: CTA chest INDICATION: Pain. COMPARISON: November 3, 2021. TECHNIQUE: Axial CT imaging from the thoracic inlet through the diaphragm with intravenous contrast. Coronal and sagittal MIP reformats were generated. Dose reduction techniques used: automated exposure control, adjustment of the mAs and/or kVp according to patient size, and iterative reconstruction techniques. Digital imaging and communications in Medicine (DICOM) format image data are available to nonaffiliated external healthcare facilities or entities on a secure, media free, reciprocally searchable basis with patient authorization for at least 12 months after this study. _______________ FINDINGS: EXAM QUALITY: Adequate. PULMONARY ARTERIES: No evidence of pulmonary embolism. MEDIASTINUM: Normal heart size. No evidence of right heart strain. Aorta is unremarkable. No pericardial effusion. LUNGS: There are again seen numerous bilateral multilobar groundglass infiltrates. PLEURA: There are small bilateral pleural effusions. AIRWAY: Normal. LYMPH NODES: No enlarged nodes. UPPER ABDOMEN: Unremarkable. OTHER: No acute or aggressive osseous abnormalities identified. _______________     1. No evidence of pulmonary embolism. 2.  Numerous bilateral multilobar groundglass infiltrates again seen. 3. Small bilateral pleural effusions. CTA CHEST W OR W WO CONT    Result Date: 11/3/2021  EXAM: CTA chest CLINICAL INDICATION/HISTORY: COVID pneumonia, shortness of breath COMPARISON: Chest radiograph from the same day TECHNIQUE: Axial CT imaging from the thoracic inlet through the diaphragm with intravenous contrast. Coronal and sagittal MIP reformats were generated.  One or more dose reduction techniques were used on this CT: automated exposure control, adjustment of the mAs and/or kVp according to patient size, and iterative reconstruction techniques. The specific techniques used on this CT exam have been documented in the patient's electronic medical record. Digital Imaging and Communications in Medicine (DICOM) format image data are available to nonaffiliated external healthcare facilities or entities on a secured, media free, reciprocally searchable basis with patient authorization for at least a 12-month period after this study. _______________ FINDINGS: EXAM QUALITY: Quit PULMONARY ARTERIES: No pulmonary embolism identified. MEDIASTINUM: Normal heart size. Aorta is unremarkable. No pericardial effusion. LUNGS: Bilateral multilobar groundglass infiltrates secondary to COVID 19 pneumonia. AIRWAY: Normal. PLEURA: Normal. LYMPH NODES: No enlarged nodes. UPPER ABDOMEN: Unremarkable. BONES: No acute or aggressive osseous abnormalities identified. OTHER: None. _______________     1. No pulmonary embolism identified. 2. Bilateral multilobar groundglass infiltrates secondary to COVID 19 pneumonia.  ABD LTD    Result Date: 11/4/2021  EXAM: Limited right upper quadrant abdominal ultrasound INDICATION: Elevated liver function tests. COMPARISON: CT angiogram of the chest 11/3/2021. TECHNIQUE: Real-time abdomen/right upper quadrant sonography in multiple planes was performed with image documentation. Grayscale, color flow Doppler imaging, and velocity spectral waveform analysis of the portal vein was performed (duplex imaging). _______________ FINDINGS: LIVER: Mildly increased hepatic parenchymal echogenicity is present. No focal mass Color flow Doppler and velocity spectral waveform analysis of the portal vein shows normal (hepatopedal) direction of flow. Heddy  BILIARY SYSTEM: No intrahepatic biliary dilatation. Common bile duct is normal in caliber measuring 0.4 cm.  GALLBLADDER: No gallstones or gallbladder wall thickening. No pericholecystic fluid. RIGHT KIDNEY: 8.6 cm in length. No hydronephrosis or renal mass. No visible calculi. PANCREAS: Head and body are unremarkable in appearance though the tail is obscured by overlying bowel gas. IVC: Visualized portions are unremarkable in appearance. OTHER: No free intraperitoneal fluid. _______________     Mildly increased hepatic parenchymal echotexture which can be seen in the context of steatosis and/or hepatocellular dysfunction. No biliary ductal dilatation or mass lesion. XR CHEST PORT    Result Date: 11/14/2021  EXAM: Chest radiograph  CLINICAL INDICATION/HISTORY: COVID Pneumonia    --Additional history: None. COMPARISON: 11/11/2021 TECHNIQUE: Frontal view of the chest _______________ FINDINGS: SUPPORT DEVICES: None. HEART AND MEDIASTINUM: Cardiac silhouette is normal in size. Normal mediastinal contours . Normal pulmonary vasculature. LUNGS AND PLEURAL SPACES: Allowing for differences in technique, no significant interval change in the patchy bilateral pulmonary opacities. Sharp costophrenic sulci. No pneumothoraces. BONY THORAX AND SOFT TISSUES: Unremarkable. _______________     No significant interval change in the patchy bilateral airspace disease in keeping with the patient's provided history of COVID pneumonia. XR CHEST PORT    Result Date: 11/11/2021  CHEST AP PORTABLE Indication: Worsening hypoxia, Covid pneumonia. Comparison: X-ray 11/07/2021. Findings: Shallow inspiration. There is persistent bilateral diffuse reticular and patchy alveolar opacities with lower lung zone predominance, stable to slightly increased from comparison exam. For example left lung and disease in right upper lung zone appears slightly more conspicuous. The cardiac silhouette and pulmonary vascularity appear within normal limits. No evidence for pneumothorax or pleural effusion.      Bilateral diffuse disease, stable to slightly increased from comparison exam.    XR CHEST PORT    Result Date: 11/7/2021  EXAM: XR CHEST PORT CLINICAL INDICATION/HISTORY: Worsening hypoxia   > Additional: None. COMPARISON: November 3, 2021 TECHNIQUE: Portable chest _______________ FINDINGS: SUPPORT DEVICES: None. HEART AND MEDIASTINUM: The heart is stable in size and contour. LUNGS AND PLEURAL SPACES: Extensive multifocal airspace opacities and consolidation are seen diffusely throughout the lungs little interval change. BONY THORAX AND SOFT TISSUES: No acute osseous abnormality. _______________     Extensive multifocal airspace opacities similar to prior study. XR CHEST PORT    Result Date: 11/3/2021  EXAM: CHEST RADIOGRAPH CLINICAL INDICATION/HISTORY: meets SIRS criteria   > Additional: Short of breath and cough COMPARISON: None TECHNIQUE: Portable frontal view of the chest _______________ FINDINGS: SUPPORT DEVICES: None. HEART AND MEDIASTINUM: Heart size is normal. LUNGS AND PLEURAL SPACES: Patchy bilateral consolidations. No pleural effusion or pneumothorax. BONES AND SOFT TISSUES: Unremarkable. _______________     Patchy bilateral consolidations could relate to COVID 19 pneumonia. ECHO ADULT COMPLETE    Result Date: 11/4/2021  · Left Ventricle: Normal cavity size, wall thickness and systolic function (ejection fraction normal). The estimated EF is 60 - 65%. There is mild (grade 1) left ventricular diastolic dysfunction. Wall Scoring: The left ventricular wall motion is normal. · Tricuspid Valve: Normal valve structure and no stenosis. Tricuspid regurgitation is inadequate for estimation of right ventricular systolic pressure. DUPLEX LOWER EXT VENOUS BILAT    Result Date: 11/11/2021  · No evidence of deep vein thrombosis in the right lower extremity. · No evidence of deep vein thrombosis in the left lower extremity. DUPLEX LOWER EXT VENOUS BILAT    Result Date: 11/5/2021  · No evidence of deep vein thrombosis in the right lower extremity.  · No evidence of deep vein thrombosis in the left lower extremity.               [facility]     CC: Other, Phys, MD

## 2021-11-19 ENCOUNTER — PATIENT OUTREACH (OUTPATIENT)
Dept: CASE MANAGEMENT | Age: 71
End: 2021-11-19

## 2021-11-19 NOTE — PROGRESS NOTES
Care Transitions Initial Call    Call within 2 business days of discharge: Yes     Patient: Sarika Munroe Patient : 1950 MRN: [id number]    Last Discharge [address]       Complaint Diagnosis Description Type Department Provider    11/3/21 Shortness of Breath Pneumonia due to COVID-19 virus . .. ED to Hosp-Admission (Discharged) (ADMIT) [facility] Angie Zaragoza MD; Katarina Nam. .. Was this an external facility discharge? No Discharge Facility: N/A    Challenges to be reviewed by the provider   Additional needs identified to be addressed with provider: no  none         Method of communication with provider : none    Discussed COVID-19 related testing which was available at this time. Test results were positive. Patient informed of results, if available? yes     Advance Care Planning:   Does patient have an Advance Directive:  not on file    Inpatient Readmission Risk score: Unplanned Readmit Risk Score: 9.8 ( )    Was this a readmission? no   Patient stated reason for the admission: N/A     Care Transition Nurse (CTN) contacted the patient by telephone to perform post hospital discharge assessment. No answer. Left HIPPA compliant message. Name, role and contact information provided. Requested return call. No PHI on file. Will attempt to contact at a later time.

## 2021-11-21 ENCOUNTER — HOME CARE VISIT (OUTPATIENT)
Dept: SCHEDULING | Facility: HOME HEALTH | Age: 71
End: 2021-11-21
Payer: MEDICARE

## 2021-11-21 PROCEDURE — G0299 HHS/HOSPICE OF RN EA 15 MIN: HCPCS

## 2021-11-21 PROCEDURE — 3331090001 HH PPS REVENUE CREDIT

## 2021-11-21 PROCEDURE — 400013 HH SOC

## 2021-11-21 PROCEDURE — 3331090002 HH PPS REVENUE DEBIT

## 2021-11-21 PROCEDURE — 400018 HH-NO PAY CLAIM PROCEDURE

## 2021-11-22 ENCOUNTER — PATIENT OUTREACH (OUTPATIENT)
Dept: CASE MANAGEMENT | Age: 71
End: 2021-11-22

## 2021-11-22 ENCOUNTER — HOME CARE VISIT (OUTPATIENT)
Dept: HOME HEALTH SERVICES | Facility: HOME HEALTH | Age: 71
End: 2021-11-22
Payer: MEDICARE

## 2021-11-22 PROCEDURE — 3331090002 HH PPS REVENUE DEBIT

## 2021-11-22 PROCEDURE — 3331090001 HH PPS REVENUE CREDIT

## 2021-11-22 NOTE — PROGRESS NOTES
Care Transitions Initial Call    Call within 2 business days of discharge: Yes     Patient: Pal Chahal Patient : 1950 MRN: [id number]    Last Discharge [address]       Complaint Diagnosis Description Type Department Provider    11/3/21 Shortness of Breath Pneumonia due to COVID-19 virus . .. ED to Hosp-Admission (Discharged) (ADMIT) [facility] Kevan Zaragoza MD; Shelli Go. .. Was this an external facility discharge? No Discharge Facility: N/A    Challenges to be reviewed by the provider   Additional needs identified to be addressed with provider: no  none         Method of communication with provider : none    Discussed COVID-19 related testing which was available at this time. Test results were positive. Patient informed of results, if available? yes     Advance Care Planning:   Does patient have an Advance Directive:  not on file    Inpatient Readmission Risk score: Unplanned Readmit Risk Score: 9.8 ( )    Was this a readmission? no   Patient stated reason for the admission: N/A     Care Transition Nurse (CTN) contacted the patient by telephone to perform post hospital discharge assessment. No answer. Left HIPPA compliant message. Name, role and contact information provided. Requested return call. No PHI on file. Will attempt to contact at a later time.

## 2021-11-23 ENCOUNTER — HOME CARE VISIT (OUTPATIENT)
Dept: SCHEDULING | Facility: HOME HEALTH | Age: 71
End: 2021-11-23
Payer: MEDICARE

## 2021-11-23 VITALS
DIASTOLIC BLOOD PRESSURE: 82 MMHG | OXYGEN SATURATION: 97 % | RESPIRATION RATE: 16 BRPM | TEMPERATURE: 97.6 F | SYSTOLIC BLOOD PRESSURE: 154 MMHG | HEART RATE: 69 BPM

## 2021-11-23 VITALS
DIASTOLIC BLOOD PRESSURE: 70 MMHG | OXYGEN SATURATION: 97 % | HEART RATE: 81 BPM | TEMPERATURE: 98.8 F | SYSTOLIC BLOOD PRESSURE: 125 MMHG | RESPIRATION RATE: 17 BRPM

## 2021-11-23 PROCEDURE — G0151 HHCP-SERV OF PT,EA 15 MIN: HCPCS

## 2021-11-23 PROCEDURE — 3331090002 HH PPS REVENUE DEBIT

## 2021-11-23 PROCEDURE — 3331090001 HH PPS REVENUE CREDIT

## 2021-11-23 NOTE — HOME HEALTH
Patient signed waiver on admission for interpretive services, does not speak english, daughter translates for patient    Skilled services/Home bound verification:   Skilled Reason for admission/summary of clinical condition:  covid, pna, htn requiring disease process teaching and medication management  This patient is homebound for the following reasons Requires considerable and taxing effort to leave the home  and Requires the assistance of 1 or more persons to leave the home . Caregiver: daughter. Caregiver assists with iadls, adls, meal prep, med management, taking to md appointments. Medications reconciled and all medications are not available in the home this visit. pt needing to get zinc filled, not available at pharmacy when cg went. pt/cg to discuss with PCP at follow up appointment to see if patient needs to get filled. The following education was provided regarding medications, medication interactions, and look alike medications (specify): reviewed side effects, purposes, dosage, frequencies. Medications  are effective at this time. High risk medication teaching regarding anticoagulants, hyperglycemic agents or opiod narcotics performed (specify) na    PCP notified of any discrepancies/medication interactions- none. Home health supplies by type and quantity ordered/delivered this visit include: na    Patient education provided this visit to include: patient/cg instructed to monitor for edema/increase in edema, to elevate extremity when edema occurs and to notify md if edema exceeds normal limits for patient, none noted at this time. patient made aware to monitor for s/s of infection [increased swelling, increased redness around site, increased pain, foul smelling drainage, fever] aware who to report to/when. no s/s of infection noted. encouraged patient to get three nutritional meals daily and to stay hydrated, drink plenty of fluids.  reviewed low sodium diet- patient aware to limit sodium, no added sodium to diet. reviewed foods to avoid, how to order foods when eating out, how to read nutrition labels and measure sodium intake. discussed importance of monitoring blood pressure daily and recording for review, discussed hypertension, causes/long term effects of uncontrolled hypertension. pt instructed to follow with diabetic diet- monitoring sugar intake, limiting foods with high sugar content. Discussed dietary adjustments such as: Reduce portion sizes, Limit daily carbohydrate intake to not greater than 45-60 grams per meal for a total of <180 grams of carbohydrate daily, Avoid concentrated carbohydrates such as soft drinks, sweet tea, and sweet treats. Increase physical activity along with strength training as tolerated to facilitate weight loss and build muscle mass. pt does not currently check bs at home, no glucometer and was not instructed to begin checking in home at this time. pt/cg educated on causes, signs/symptoms of exacerbation, methods of prevention of pneumonia. SN discussed s/s of COVID to monitor for including fever, SOB, cough and when to seek tx. pt aware to use oxygen therapy as prescribed by MD patient instructed to be very careful when ambulating around oxygen tubing to prevent any falls from occurring. discussed fall precautions in detail- having lighted hallways, removing throw rugs, monitoring medication that may alter mental status. patient made aware to turn every 2 hours and to keep pressure off of bony prominences, to monitor for any pressure ulcer development/worsening. pt denies any questions or concerns at this time. Patient/caregiver degree of understanding:good    Home exercise program/Homework provided: patient instructed to perform sob hep 4-5 x daily and prn for sob, to promote lung expansion. pt also encouraged to use ICS q 2 hours and to perform sob hep during therapy.      Pt/Caregiver instructed on plan of care and are agreeable to plan of care at this time.      Physician Maegan Moyer MD notified of patient admission to home health and plan of care including anticipated frequency of 2w2 1w4 2 prn and treatments/interventions/modalities of disease process teaching and medication management. Discharge planning discussed with patient and caregiver. Discharge planning as follows: Patient will be discharged once education has completed, patient is medically stable and pt/cg are able to independently manage medications and disease process. Pt/Caregiver did verbalize understanding of discharge planning. Next MD appointment 11/24/21 (date) with Meagan Moyer MD.  Patient/caregiver encouraged/instructed to keep appointment as lack of follow through with physician appointment could result in discontinuation of home care services for non-compliance.

## 2021-11-23 NOTE — HOME HEALTH
S: Pt stated she was feeling okay and not in any pain. No SOB at rest reported on 2L O2. Her daughter and son in law were present and translating from pt's native tongue [place]. O: PAIN: Pt denies pain at this time. WOUND:no open wounds noted or reported. ROM: no impairments noted   STRENGTH:Patient demonstrates decreased muscle strength as follows:  R hip flex 3/5  R hip abd 3/5  R hip add 3/5  R hip ext 3/5  R knee flex 3/5  R knee ext 3/5  R ankle DF 3/5  L hip flex 3/5  L hip abd 3/5  L hip ext 3/5  L hip add 3/5  L knee flex 3/5  L knee ext 3/5  L ankle DF 3/5   BED MOBILITY: SBA   EQUIPMENT: portable O2 tank and O2 concentrator. I called Reena Spaulding due to pt's report of not feeling the air on 2L with the portable tank. They instructed her to keep it on cf2 instead due to difficulty saturating at this time. TRANSFERS: patient requires SBA from EOB and uses BUE to push up and significant support of back of legs on bed for support and balance upon standing, has a wide base of support. GAIT: patient ambulating 40 ft with SBA without an AD and therapist assisting with O2 tubing. Patient demonstrates the following gait deficits: poor foot clearance and WBOS. Patient requires VC's for standing for a few seconds before walking to improve safety and decrease risk of falling. Her O2 sats dropped to 88% with walking and took >5 minutes to recover to 93% on 2L continuous O2. STEPS: there are/is 3 to enter home and 17 to her bedroom. Steps not assessed due to poor saturation with walking. BALANCE: Tinetti 8/28 and TUG 40 seconds - high fall risk due to reduced strength, gait deviations and decreased efficiency of balance reactions and pain. Patient demonstrates poor use and activation of ankle and hip strategy during standing balance testing and activities.     A:ASSESSMENT AND PROGRESS TOWARD GOALS:  Patient demonstrated a positive result to therapy this date as evidenced by an improvement in gait pattern with cues, an understanding of her fall risk and agreement to not walk alone,  and patient expressing an understanding of the rehab plan due to therapy verbal and written instructions. Goals established for increased independence in the home, safe mobility in the home, improvement in strength and balance all designed to reduce fall risk and progress toward independence. Patient will benefit from continued PT intervention to progress toward meeting all established goals     P:1W1, 2W4, 1W1. PMH/Summary of clinical condition: Laina Hunt is a 70 y.o. female referred s/p 15 days hospitalization with COVID PNA. PMH: HTN, HLD, DM2 and acute respiratory failure. She has a f/u with her PCP tomorrow. Medications reconciled. No changes in medications at this time. Medications are effective at this time. Social history/ caregivers: lives with her daughter, son in law and grandchildren in a 2 story home with 3 steps to enter and 17 steps to her bedroom. She requires assistance from all caregivers for ADL's, IADL's, medication management and transportation. Pt/Caregiver instructed on plan of care and are agreeable to plan of care at this time. Plan of care and admission to home health status reported to attending physician: Dr. Tierra Christopher. I called and LM with Dr. Tierra Christopher office [facility] [address]. high bloodpressure and whether to have pt resume Micardis and aspirin which she was taking prior to this admission but was not on her discharge medication list.   Discharge planning discussed with patient and caregiver. Discharge planning as follows: DC to self and family under MD supervision when all goals are met or maximum potential is reached  Pt/Caregiver did verbalize understanding. Patient education provided this visit: safety, fall risk, HEP, need for assistance at all times with transfers and ambulation  Home exercise program: 1. always have someone with her for assistance when transferring or ambulating   2. stand and walk short distances every hour during the day to increase strength, provide pressure relief and increase independence with transfers  3. Patient/CG instructed in a  HEP as follows: Pt instructed in pursed lipped breathing x 4 reps very hour and toe taps x 30 reps 1-2 times daily. Continued need for the following skills: MD referral for HHPT following recent hospital stay. HHPT is medically necessary to address  dx and clinical findings:  decreased strength, impaired gait, decreased ability w stair negotiation, increased swelling, decreased transfer status, decreased endurance, decreased balance and decreased safety, increased pain in order to improve functional mobility/quality of life, decrease burden of care, reduce risk for re-hospitalization, work towards patient's personal goals of return to PLOF w decrease risk for falls.

## 2021-11-23 NOTE — Clinical Note
Servando Blackburn is a 70 y.o. female referred s/p 15 days hospitalization with COVID PNA. PMH: HTN, HLD, DM2 and acute respiratory failure. She has a f/u with her PCP tomorrow. Plan: 7I3, 2W4, 1W1.    Therapy Functional Score Assessment  Question   Score   Grooming  2       Upper Dressing 2      Lower Dressing 3      Bathing  5      Toilet Transfer  1    Transfer  2            Ambulation  3   Dyspnea                     3       Pain Interfering with activity 0  Est number therapy visits      10

## 2021-11-24 PROCEDURE — 3331090001 HH PPS REVENUE CREDIT

## 2021-11-24 PROCEDURE — 3331090002 HH PPS REVENUE DEBIT

## 2021-11-25 ENCOUNTER — HOME CARE VISIT (OUTPATIENT)
Dept: HOME HEALTH SERVICES | Facility: HOME HEALTH | Age: 71
End: 2021-11-25
Payer: MEDICARE

## 2021-11-25 PROCEDURE — 3331090002 HH PPS REVENUE DEBIT

## 2021-11-25 PROCEDURE — 3331090001 HH PPS REVENUE CREDIT

## 2021-11-26 PROCEDURE — 3331090001 HH PPS REVENUE CREDIT

## 2021-11-26 PROCEDURE — 3331090002 HH PPS REVENUE DEBIT

## 2021-11-27 PROCEDURE — 3331090002 HH PPS REVENUE DEBIT

## 2021-11-27 PROCEDURE — 3331090001 HH PPS REVENUE CREDIT

## 2021-11-28 PROCEDURE — 3331090001 HH PPS REVENUE CREDIT

## 2021-11-28 PROCEDURE — 3331090002 HH PPS REVENUE DEBIT

## 2021-11-29 ENCOUNTER — PATIENT OUTREACH (OUTPATIENT)
Dept: CASE MANAGEMENT | Age: 71
End: 2021-11-29

## 2021-11-29 ENCOUNTER — HOME CARE VISIT (OUTPATIENT)
Dept: SCHEDULING | Facility: HOME HEALTH | Age: 71
End: 2021-11-29
Payer: MEDICARE

## 2021-11-29 ENCOUNTER — HOME CARE VISIT (OUTPATIENT)
Dept: HOME HEALTH SERVICES | Facility: HOME HEALTH | Age: 71
End: 2021-11-29
Payer: MEDICARE

## 2021-11-29 PROCEDURE — 3331090001 HH PPS REVENUE CREDIT

## 2021-11-29 PROCEDURE — G0299 HHS/HOSPICE OF RN EA 15 MIN: HCPCS

## 2021-11-29 PROCEDURE — 3331090002 HH PPS REVENUE DEBIT

## 2021-11-29 NOTE — LETTER
11/29/2021 10:41 AM    Ms. Juliana [phone]      Dear Polina Flores    My name is *** and I am a {Blank Single Select Template:20061::\"Care Transition Nurse\",\"Ambulatory Care Manager\",\"LPN Care Coordinator\",\"Medical Assistant\",\"\",\"\",\"Health \"} who partners with (practice or provider***) to improve patients' health. I've been trying to reach you via phone to let you know that, as a member of your care team, I will work with other providers involved in your care, offer education for your specific health conditions, and connect you with more resources as needed. This program is designed to provide you with the opportunity to have a ([address] [facility]/[address]) care manager partner with you for the following situations:     1) if you come home from the hospital or emergency room   2) to help manage your disease   3) when you would like assistance coordinating services or appointments    This added support is provided at no additional cost to you. My primary focus is to help you achieve specific goals and improve your health. Please call me at *** to further discuss how I can support your health care needs.      Sincerely,      Bailey Velez RN

## 2021-11-30 ENCOUNTER — HOME CARE VISIT (OUTPATIENT)
Dept: HOME HEALTH SERVICES | Facility: HOME HEALTH | Age: 71
End: 2021-11-30
Payer: MEDICARE

## 2021-11-30 VITALS
TEMPERATURE: 98.4 F | RESPIRATION RATE: 18 BRPM | OXYGEN SATURATION: 98 % | HEART RATE: 88 BPM | DIASTOLIC BLOOD PRESSURE: 90 MMHG | SYSTOLIC BLOOD PRESSURE: 158 MMHG

## 2021-11-30 PROCEDURE — 3331090002 HH PPS REVENUE DEBIT

## 2021-11-30 PROCEDURE — 3331090001 HH PPS REVENUE CREDIT

## 2021-11-30 NOTE — HOME HEALTH
Skilled reason for visit: SN assessment, education  Caregiver involvement: Patient clean, dressed and well nourished. Home neat and clean. Medications reconciled and all medications are available in the home this visit. The following education was provided regarding medications, medication interactions, and look a like medications:  Albuterol:  Warn patient to report symptoms of paradoxical bronchospasm . Instruct patient to report need for increased frequency or amounts of drug to provide symptomatic relief . Advise patient to report symptoms of atrial fibrillation, supraventricular tachycardia, or hypokalemia . Side effects may include palpitations, chest pain, tremors, headache, dizziness, or nervousness . Advise patient on proper inhalation technique, depending on delivery device . Medications  are effective at this time. Home health supplies by type and quantity ordered/delivered this visit include: None this visit  Patient education provided this visit: Medication  Progress toward goals: Progressing  Home exercise program: Participating  Continued need for the following skills: Nursing  The following discharge planning was discussed with the pt/caregiver:  Will d/c to home/self care when goals are met    Called Dr. Karthik Fregoso to report high B/P and received response that patient is to continue to hold Micardis and monitor BP

## 2021-12-01 ENCOUNTER — HOME CARE VISIT (OUTPATIENT)
Dept: HOME HEALTH SERVICES | Facility: HOME HEALTH | Age: 71
End: 2021-12-01
Payer: MEDICARE

## 2021-12-01 PROCEDURE — 3331090002 HH PPS REVENUE DEBIT

## 2021-12-01 PROCEDURE — 3331090001 HH PPS REVENUE CREDIT

## 2021-12-02 ENCOUNTER — HOME CARE VISIT (OUTPATIENT)
Dept: HOME HEALTH SERVICES | Facility: HOME HEALTH | Age: 71
End: 2021-12-02
Payer: MEDICARE

## 2021-12-02 PROCEDURE — 3331090001 HH PPS REVENUE CREDIT

## 2021-12-02 PROCEDURE — 3331090002 HH PPS REVENUE DEBIT

## 2021-12-03 ENCOUNTER — HOME CARE VISIT (OUTPATIENT)
Dept: HOME HEALTH SERVICES | Facility: HOME HEALTH | Age: 71
End: 2021-12-03
Payer: MEDICARE

## 2021-12-03 PROCEDURE — 3331090001 HH PPS REVENUE CREDIT

## 2021-12-03 PROCEDURE — 3331090002 HH PPS REVENUE DEBIT

## 2021-12-04 PROCEDURE — 3331090001 HH PPS REVENUE CREDIT

## 2021-12-04 PROCEDURE — 3331090002 HH PPS REVENUE DEBIT

## 2021-12-05 PROCEDURE — 3331090002 HH PPS REVENUE DEBIT

## 2021-12-05 PROCEDURE — 3331090001 HH PPS REVENUE CREDIT

## 2021-12-06 PROCEDURE — 3331090001 HH PPS REVENUE CREDIT

## 2021-12-06 PROCEDURE — 3331090002 HH PPS REVENUE DEBIT

## 2021-12-07 ENCOUNTER — HOME CARE VISIT (OUTPATIENT)
Dept: HOME HEALTH SERVICES | Facility: HOME HEALTH | Age: 71
End: 2021-12-07
Payer: MEDICARE

## 2021-12-07 PROCEDURE — 3331090001 HH PPS REVENUE CREDIT

## 2021-12-07 PROCEDURE — 3331090002 HH PPS REVENUE DEBIT

## 2021-12-07 NOTE — CASE COMMUNICATION
SN could get onto [facility] today for scheduled SN visit. I tried, but I am being told by the [facility] that the patient has to sponsor me to get on post and we could not reach anyone at either number listed today to verily the patient or the caregiver would sponsor me. Jeff Fernandez  already has been sponsored to get on post. She will need to see the patient from now on, due to this. Today's visit will need to be moved to tomorrow to [facility]. Schedulers, PT, OT and Shayy Parikh RN notified/case communication about issues seeing patient today.

## 2021-12-07 NOTE — CASE COMMUNICATION
SN could not get onto [facility] today for scheduled SN visit. I tried, but I am being told by the [facility] that the patient has to sponsor me to get on post and we could not reach anyone at either number listed today to verily the patient or the caregiver would sponsor me. [facility] already has been sponsored to get on post. She will need to see the patient from now on, due to this. Today's visit will need to be moved to tomorrow [facility]. Schedulers, PT, OT and Simona Smith RN notified/case communication about issues seeing patient today.

## 2021-12-08 ENCOUNTER — HOME CARE VISIT (OUTPATIENT)
Dept: SCHEDULING | Facility: HOME HEALTH | Age: 71
End: 2021-12-08
Payer: MEDICARE

## 2021-12-08 ENCOUNTER — HOME CARE VISIT (OUTPATIENT)
Dept: HOME HEALTH SERVICES | Facility: HOME HEALTH | Age: 71
End: 2021-12-08
Payer: MEDICARE

## 2021-12-08 PROCEDURE — G0299 HHS/HOSPICE OF RN EA 15 MIN: HCPCS

## 2021-12-08 PROCEDURE — 3331090002 HH PPS REVENUE DEBIT

## 2021-12-08 PROCEDURE — 3331090001 HH PPS REVENUE CREDIT

## 2021-12-09 ENCOUNTER — HOME CARE VISIT (OUTPATIENT)
Dept: HOME HEALTH SERVICES | Facility: HOME HEALTH | Age: 71
End: 2021-12-09
Payer: MEDICARE

## 2021-12-09 PROCEDURE — 3331090002 HH PPS REVENUE DEBIT

## 2021-12-09 PROCEDURE — 3331090001 HH PPS REVENUE CREDIT

## 2021-12-10 VITALS
DIASTOLIC BLOOD PRESSURE: 90 MMHG | OXYGEN SATURATION: 97 % | HEART RATE: 82 BPM | TEMPERATURE: 97.2 F | SYSTOLIC BLOOD PRESSURE: 150 MMHG | RESPIRATION RATE: 18 BRPM

## 2021-12-10 PROCEDURE — 3331090001 HH PPS REVENUE CREDIT

## 2021-12-10 PROCEDURE — 3331090002 HH PPS REVENUE DEBIT

## 2021-12-10 NOTE — HOME HEALTH
Skilled reason for visit: Education, assessment, discharge  Caregiver involvement: Patient clean, dressed and well nourished. Home neat and clean. Medications reconciled and all medications are available in the home this visit. The following education was provided regarding medications, medication interactions, and look a like medications:  Albuterol:  Warn patient to report symptoms of paradoxical bronchospasm . Instruct patient to report need for increased frequency or amounts of drug to provide symptomatic relief . Advise patient to report symptoms of atrial fibrillation, supraventricular tachycardia, or hypokalemia . Side effects may include palpitations, chest pain, tremors, headache, dizziness, or nervousness . Advise patient on proper inhalation technique, depending on delivery device . Medications  are effective at this time. Home health supplies by type and quantity ordered/delivered this visit include: None this visit  Patient education provided this visit: Medication  Progress toward goals: Progressing  Home exercise program: Participating  Continued need for the following skills: Nursing  The following discharge planning was discussed with the pt/caregiver:  Will d/c to home/self care when goals are met

## 2021-12-11 PROCEDURE — 3331090002 HH PPS REVENUE DEBIT

## 2021-12-11 PROCEDURE — 3331090001 HH PPS REVENUE CREDIT

## 2021-12-12 PROCEDURE — 3331090001 HH PPS REVENUE CREDIT

## 2021-12-12 PROCEDURE — 3331090002 HH PPS REVENUE DEBIT

## 2021-12-13 ENCOUNTER — HOME CARE VISIT (OUTPATIENT)
Dept: HOME HEALTH SERVICES | Facility: HOME HEALTH | Age: 71
End: 2021-12-13
Payer: MEDICARE

## 2021-12-13 PROCEDURE — 3331090002 HH PPS REVENUE DEBIT

## 2021-12-13 PROCEDURE — 3331090001 HH PPS REVENUE CREDIT

## 2021-12-14 PROCEDURE — 3331090001 HH PPS REVENUE CREDIT

## 2021-12-14 PROCEDURE — 3331090002 HH PPS REVENUE DEBIT

## 2021-12-15 ENCOUNTER — HOME CARE VISIT (OUTPATIENT)
Dept: HOME HEALTH SERVICES | Facility: HOME HEALTH | Age: 71
End: 2021-12-15
Payer: MEDICARE

## 2021-12-15 PROCEDURE — 3331090002 HH PPS REVENUE DEBIT

## 2021-12-15 PROCEDURE — 3331090001 HH PPS REVENUE CREDIT

## 2021-12-16 PROCEDURE — 3331090002 HH PPS REVENUE DEBIT

## 2021-12-16 PROCEDURE — 3331090001 HH PPS REVENUE CREDIT

## 2021-12-17 PROCEDURE — 3331090002 HH PPS REVENUE DEBIT

## 2021-12-17 PROCEDURE — 3331090001 HH PPS REVENUE CREDIT

## 2021-12-18 PROCEDURE — 3331090002 HH PPS REVENUE DEBIT

## 2021-12-18 PROCEDURE — 3331090001 HH PPS REVENUE CREDIT

## 2021-12-19 PROCEDURE — 3331090002 HH PPS REVENUE DEBIT

## 2021-12-19 PROCEDURE — 3331090001 HH PPS REVENUE CREDIT

## 2021-12-20 PROCEDURE — 3331090001 HH PPS REVENUE CREDIT

## 2021-12-20 PROCEDURE — 3331090002 HH PPS REVENUE DEBIT

## 2021-12-21 ENCOUNTER — HOME CARE VISIT (OUTPATIENT)
Dept: HOME HEALTH SERVICES | Facility: HOME HEALTH | Age: 71
End: 2021-12-21
Payer: MEDICARE

## 2021-12-21 PROCEDURE — 400018 HH-NO PAY CLAIM PROCEDURE

## 2021-12-23 ENCOUNTER — HOME CARE VISIT (OUTPATIENT)
Dept: HOME HEALTH SERVICES | Facility: HOME HEALTH | Age: 71
End: 2021-12-23
Payer: MEDICARE

## 2021-12-28 ENCOUNTER — HOME CARE VISIT (OUTPATIENT)
Dept: SCHEDULING | Facility: HOME HEALTH | Age: 71
End: 2021-12-28
Payer: MEDICARE

## 2021-12-28 NOTE — CASE COMMUNICATION
Pt has been contacted on multiple numbers to schedule [facility] PT treatment session. Return call x1 with VM left from daughter. No answer with follow up calls.   Will attempt PT treatment sessions next week, if unable to access will CC with evaluating therapist regarding discharge for noncompliance

## 2021-12-28 NOTE — HOME HEALTH
Adalberto Baker received skilled PT and RN s/p dx of COVID PNA. She was only seen for the PT initial evaluation and several attempts were made to schedule future visits but the pt/cg never returned calls for scheduling. She will be discharged from out services from this time due to inability to follow plan of care with several missed visits. MD notified, LM with Dr. Jessica Echeverria office staff.

## 2021-12-28 NOTE — Clinical Note
Rosie Ballard received skilled PT and RN s/p dx of COVID PNA. She was only seen for the PT initial evaluation and several attempts were made to schedule future visits but the pt/cg never returned calls for scheduling. She will be discharged from out services from this time due to inability to follow plan of care with several missed visits. MD notified, LM with Dr. Shelton Cervantes office staff.

## 2022-01-03 ENCOUNTER — HOME CARE VISIT (OUTPATIENT)
Dept: HOME HEALTH SERVICES | Facility: HOME HEALTH | Age: 72
End: 2022-01-03
Payer: MEDICARE

## 2022-01-27 ENCOUNTER — HOSPITAL ENCOUNTER (EMERGENCY)
Age: 72
Discharge: HOME | End: 2022-01-27
Attending: STUDENT IN AN ORGANIZED HEALTH CARE EDUCATION/TRAINING PROGRAM
Payer: MEDICARE

## 2022-01-27 ENCOUNTER — APPOINTMENT (OUTPATIENT)
Dept: CT IMAGING | Age: 72
End: 2022-01-27
Attending: STUDENT IN AN ORGANIZED HEALTH CARE EDUCATION/TRAINING PROGRAM
Payer: MEDICARE

## 2022-01-27 VITALS
OXYGEN SATURATION: 100 % | BODY MASS INDEX: 28.17 KG/M2 | TEMPERATURE: 97.1 F | HEART RATE: 95 BPM | HEIGHT: 64 IN | SYSTOLIC BLOOD PRESSURE: 151 MMHG | DIASTOLIC BLOOD PRESSURE: 68 MMHG | RESPIRATION RATE: 17 BRPM | WEIGHT: 165 LBS

## 2022-01-27 DIAGNOSIS — R41.0 DELIRIUM: ICD-10-CM

## 2022-01-27 DIAGNOSIS — R44.0 AUDITORY HALLUCINATIONS: Primary | ICD-10-CM

## 2022-01-27 DIAGNOSIS — Z86.16 HISTORY OF COVID-19: ICD-10-CM

## 2022-01-27 DIAGNOSIS — G47.9 DIFFICULTY SLEEPING: ICD-10-CM

## 2022-01-27 LAB
ALBUMIN SERPL-MCNC: 3.7 G/DL (ref 3.4–5)
ALBUMIN/GLOB SERPL: 0.9 {RATIO} (ref 0.8–1.7)
ALP SERPL-CCNC: 133 U/L (ref 45–117)
ALT SERPL-CCNC: 27 U/L (ref 13–56)
ANION GAP SERPL CALC-SCNC: 2 MMOL/L (ref 3–18)
APPEARANCE UR: ABNORMAL
AST SERPL-CCNC: 33 U/L (ref 10–38)
BASOPHILS # BLD: 0 K/UL (ref 0–0.1)
BASOPHILS NFR BLD: 0 % (ref 0–2)
BILIRUB DIRECT SERPL-MCNC: 0.2 MG/DL (ref 0–0.2)
BILIRUB SERPL-MCNC: 0.8 MG/DL (ref 0.2–1)
BILIRUB UR QL: NEGATIVE
BUN SERPL-MCNC: 6 MG/DL (ref 7–18)
BUN/CREAT SERPL: 8 (ref 12–20)
CALCIUM SERPL-MCNC: 9.7 MG/DL (ref 8.5–10.1)
CHLORIDE SERPL-SCNC: 108 MMOL/L (ref 100–111)
CO2 SERPL-SCNC: 31 MMOL/L (ref 21–32)
COLOR UR: YELLOW
CREAT SERPL-MCNC: 0.8 MG/DL (ref 0.6–1.3)
DIFFERENTIAL METHOD BLD: ABNORMAL
EOSINOPHIL # BLD: 0 K/UL (ref 0–0.4)
EOSINOPHIL NFR BLD: 1 % (ref 0–5)
ERYTHROCYTE [DISTWIDTH] IN BLOOD BY AUTOMATED COUNT: 12.7 % (ref 11.6–14.5)
GLOBULIN SER CALC-MCNC: 4 G/DL (ref 2–4)
GLUCOSE SERPL-MCNC: 137 MG/DL (ref 74–99)
GLUCOSE UR STRIP.AUTO-MCNC: NEGATIVE MG/DL
HCT VFR BLD AUTO: 43.8 % (ref 35–45)
HGB BLD-MCNC: 13.1 G/DL (ref 12–16)
HGB UR QL STRIP: NEGATIVE
IMM GRANULOCYTES # BLD AUTO: 0 K/UL (ref 0–0.04)
IMM GRANULOCYTES NFR BLD AUTO: 0 % (ref 0–0.5)
KETONES UR QL STRIP.AUTO: ABNORMAL MG/DL
LEUKOCYTE ESTERASE UR QL STRIP.AUTO: NEGATIVE
LIPASE SERPL-CCNC: 47 U/L (ref 73–393)
LYMPHOCYTES # BLD: 1.7 K/UL (ref 0.9–3.6)
LYMPHOCYTES NFR BLD: 30 % (ref 21–52)
MCH RBC QN AUTO: 26.6 PG (ref 24–34)
MCHC RBC AUTO-ENTMCNC: 29.9 G/DL (ref 31–37)
MCV RBC AUTO: 89 FL (ref 78–100)
MONOCYTES # BLD: 0.5 K/UL (ref 0.05–1.2)
MONOCYTES NFR BLD: 9 % (ref 3–10)
NEUTS SEG # BLD: 3.4 K/UL (ref 1.8–8)
NEUTS SEG NFR BLD: 60 % (ref 40–73)
NITRITE UR QL STRIP.AUTO: NEGATIVE
NRBC # BLD: 0 K/UL (ref 0–0.01)
NRBC BLD-RTO: 0 PER 100 WBC
PH UR STRIP: 5.5 [PH] (ref 5–8)
PLATELET # BLD AUTO: 222 K/UL (ref 135–420)
PMV BLD AUTO: 11.1 FL (ref 9.2–11.8)
POTASSIUM SERPL-SCNC: 4 MMOL/L (ref 3.5–5.5)
PROT SERPL-MCNC: 7.7 G/DL (ref 6.4–8.2)
PROT UR STRIP-MCNC: NEGATIVE MG/DL
RBC # BLD AUTO: 4.92 M/UL (ref 4.2–5.3)
SODIUM SERPL-SCNC: 141 MMOL/L (ref 136–145)
SP GR UR REFRACTOMETRY: 1.01 (ref 1–1.03)
UROBILINOGEN UR QL STRIP.AUTO: 1 EU/DL (ref 0.2–1)
WBC # BLD AUTO: 5.7 K/UL (ref 4.6–13.2)

## 2022-01-27 PROCEDURE — 85025 COMPLETE CBC W/AUTO DIFF WBC: CPT

## 2022-01-27 PROCEDURE — 80048 BASIC METABOLIC PNL TOTAL CA: CPT

## 2022-01-27 PROCEDURE — 99284 EMERGENCY DEPT VISIT MOD MDM: CPT

## 2022-01-27 PROCEDURE — 80076 HEPATIC FUNCTION PANEL: CPT

## 2022-01-27 PROCEDURE — 70450 CT HEAD/BRAIN W/O DYE: CPT

## 2022-01-27 PROCEDURE — 81003 URINALYSIS AUTO W/O SCOPE: CPT

## 2022-01-27 PROCEDURE — 99283 EMERGENCY DEPT VISIT LOW MDM: CPT

## 2022-01-27 PROCEDURE — 83690 ASSAY OF LIPASE: CPT

## 2022-01-27 RX ORDER — ASPIRIN 81 MG/1
TABLET ORAL DAILY
COMMUNITY

## 2022-01-27 NOTE — ED PROVIDER NOTES
EMERGENCY DEPARTMENT HISTORY AND PHYSICAL EXAM      Date: 1/27/2022  Patient Name: Stephany Olivares    History of Presenting Illness     Chief Complaint   Patient presents with    Mental Health Problem       History Provided By: Patient and Patient's Daughter    History (Context):   70 y.o. female with history of htn, diabetes, hyperlipidemia with complaints that predominately only seem to be psychosocial in nature, however, possible to have organic cause of symptoms. Ambulatory patient arrives with daughter with complaints of \"not feeling well\", head feeling heavy, and hearing voices for 3 weeks, voices calling her name and family's name, denies HI/SI, daughter states she has not been acting normal     [employer] telephone  used    Consider SI/HI, overdose, psychosis, medication noncompliance, depression, organic cause of symptoms. PCP: Nuris Jean-Baptiste MD    Current Outpatient Medications   Medication Sig Dispense Refill    aspirin delayed-release 81 mg tablet Take  by mouth daily.  telmisartan (MICARDIS) 20 mg tablet Take 20 mg by mouth daily.  cholecalciferol (VITAMIN D3) (1000 Units /25 mcg) tablet Take 2 Tablets by mouth daily. 5 Tablet 0    melatonin, rapid dissolve, 5 mg TbDi tablet Take 2 Tablets by mouth nightly. 5 Tablet 0    pantoprazole (PROTONIX) 40 mg tablet Take 1 Tablet by mouth Daily (before breakfast). 10 Tablet 0       Past History     Past Medical History:  Past Medical History:   Diagnosis Date    Diabetes ([address]     HTN (hypertension)     Hyperlipidemia        Past Surgical History:  History reviewed. No pertinent surgical history. Family History:  Family History   Problem Relation Age of Onset    Hypertension Mother        Social History:  Social History     Tobacco Use    Smoking status: Never Smoker    Smokeless tobacco: Never Used   Substance Use Topics    Alcohol use:  Yes    Drug use: Never       Allergies:  No Known Allergies      Review of Systems   In addition to that documented in the HPI above  Constitutional: Denies fevers or chills  Eyes: Denies vision changes  ENMT: Denies sore throat  CV: Denies chest pain  Resp: Denies SOB  GI: Denies vomiting or diarrhea  : Denies painful urination  MSK: Denies recent trauma  Skin: Denies new rashes  Neuro: Denies new numbness or tingling or weakness  Endocrine: Denies polyuria  Heme: Denies bleeding disorders      Physical Exam     Vitals:    01/27/22 1235 01/27/22 1447   BP: (!) 173/90 (!) 151/68   Pulse: 95    Resp: 17    Temp: 97.1 °F (36.2 °C)    SpO2: 100%    Weight: 74.8 kg (165 lb)    Height: 5' 4\" (1.626 m)      Vital signs wdl  General: Patient is awake and alert, resting comfortably in no acute distress  Head: Normocephalic and atraumatic  Eyes: Extraocular muscles intact, no conjunctival pallor  Ear, nose, throat: Normal external exam  Neck: Normal range of motion  Cardiovascular: RRR, no murmur auscultated, warm, well-perfused extremities  Respiratory: Patient is in no respiratory distress, lungs CTAB  GI: soft, non-tender, non-distended  MSK: No gross deformities appreciated  Extremities: pulses intact with good cap refills, no LE pitting edema or calf tenderness  Skin: Warm, dry, and intact  Neuro: The patient is alert and oriented, no gross motor or sensory defects noted. Psych: Appropriate mood and affect. Diagnostic Study Results       Radiologic Studies -   CT HEAD WO CONT   Final Result         1. No acute intracranial abnormality demonstrated. 2. Mild periventricular white matter low-attenuation; a nonspecific finding   which can be seen in the context of chronic ischemic microvascular change. CT Results  (Last 48 hours)    None        CXR Results  (Last 48 hours)    None            Medical Decision Making   I am the first provider for this patient.     I reviewed the vital signs, available nursing notes, past medical history, past surgical history, family history and social history. Vital Signs-Reviewed the patient's vital signs. EKG: Interpreted by myself. Records Reviewed: By myself personally on initial evaluation    MDM:   She is without current medical complaints and is normally oriented with a normal exam and vital signs. Denies any recent head trauma. There are no worrisome signs of intracranial process such as bleeding or infection. No SI/HI, no current symptoms. Orders as below:  Orders Placed This Encounter    CT HEAD WO CONT    CBC WITH AUTOMATED DIFF    BASIC METABOLIC PANEL    URINALYSIS W/ RFLX MICROSCOPIC    LIPASE    HEPATIC FUNCTION PANEL    aspirin delayed-release 81 mg tablet        ED Course:   ED Course as of 01/31/22 0100   Thu Jan 27, 2022   1332 Urinalysis w/o signs of infection     [DM]   1344 CBC without leukocytosis or anemia. [DM]   1400 No electrolyte abnormality on chemistry panel.     [DM]      ED Course User Index  [DM] Lopez Burden MD     Suspect that there may be some dementia associated changes. Recommend that she see a geriatrician. Disposition:   Home    No acute pathology necessitating further emergent workup or hospital admission is suspected or found. Will discharge home with Follow up. She is comfortable with the plan and discharge at this time. Expressed the importance of follow up for current symptoms and she agrees and was advised on what signs/symptoms to return immediately to the ER.          Follow-up Information     Follow up With Specialties Details Why Contact Info    Kyle Thompson, DO Internal Medicine Schedule an appointment as soon as possible for a visit   [facility] [address] [place] [address] [facility]      THE [facility] EMERGENCY DEPT Emergency Medicine Go to  If symptoms worsen [address] [address]  [phone]          Discharge Medication List as of 1/27/2022  2:43 PM          Procedures:  Procedures      Critical Care Time: Diagnosis     Clinical Impression:   1. Auditory hallucinations    2. Difficulty sleeping    3. Delirium    4. History of COVID-19        Stan Jordan MD      As a voice dictation software was utilized to dictate this note, minor word transpositions can occur. I apologize for confusing wording and typographic errors. Please feel free to contact me for clarification.

## 2022-01-27 NOTE — DISCHARGE INSTRUCTIONS
Please carefully read all discharge instructions    Recommend you see a geriatrician, this may be setting in of mild dementia. Please follow-up with a primary care physician and if you do not have one currently use the contact information provided to obtain an appointment. If none was provided please call the number on the back of your insurance card to locate a Primary care doctor. Many offices have \"cancellation lists\" that you can ask to be placed on; should a patient with an earlier appointment cancel you will be notified to take their place. Please return to the Emergency Room immediately if your symptoms worsen. Please return to the Emergency Department if you develop a fever, chills, cannot eat or drink due to nausea or vomiting, or if any of your symptoms worsen. If you do not have insurance you can use the below for your medications. [email or web address]    What are GoodRx coupons? GoodRx coupons will help you pay less than the cash price for your prescription. Doyal Norlander free to use and are accepted at virtually every [place] pharmacy. Your pharmacist will know how to enter the codes on the coupon to pull up the lowest discount available. Fanbouts Activation    Thank you for requesting access to Fanbouts. Please follow the instructions below to securely access and download your online medical record. Fanbouts allows you to send messages to your doctor, view your test results, renew your prescriptions, schedule appointments, and more. How Do I Sign Up? In your internet browser, go to [email or web address]  Click on the First Time User? Click Here link in the Sign In box. You will be redirect to the New Member Sign Up page. Enter your Fanbouts Access Code exactly as it appears below. You will not need to use this code after youve completed the sign-up process. If you do not sign up before the expiration date, you must request a new code.     Fanbouts Access Code: [email or web address]  Expires: 2/2/2022  7:49 AM    Enter the last four digits of your Social Security Number (xxxx) and Date of Birth (mm/dd/yyyy) as indicated and click Submit. You will be taken to the next sign-up page. Create a [employer] ID. This will be your [employer] login ID and cannot be changed, so think of one that is secure and easy to remember. Create a [employer] password. You can change your password at any time. Enter your Password Reset Question and Answer. This can be used at a later time if you forget your password. Enter your e-mail address. You will receive e-mail notification when new information is available in [address] Click Sign Up. You can now view and download portions of your medical record. Click the [employer] link to download a portable copy of your medical information. Additional Information    If you have questions, please visit the Frequently Asked Questions section of the [employer] website at [email or web address] Remember, [employer] is NOT to be used for urgent needs. For medical emergencies, dial 911.

## 2022-01-27 NOTE — ED TRIAGE NOTES
Ambulatory patient arrives with daughter with complaints of \"not feeling well\", head feeling heavy, and hearing voices for 3 weeks, voices calling her name and family's name, denies HI/SI, daughter states she has not been acting normal    [employer] telephone  used

## 2022-10-22 NOTE — PROGRESS NOTES
3834 - Bedside shift report received from [place], [facility]. Assumed care of patient. 1565 - Assessment completed as per flowsheet. Patient speaks primarily [place]. Using  services to communicate with patient. Patient noted alert and oriented x4. Respirations noted even and unlabored. Patient denies SOB and cough. On 7L via hi-dominique cannula. O2 sats noted 88%. O2 increased to 8L and patient encouraged to take deep breaths. O2 sats slowly increased to 99%. Patient up ad isaac to bedside commode. Reports small BM this morning. Voiding without difficulty. Denies any pain/discomfort. Patient resting in bed at this time with call light in reach. 56 - Updated Dr. Paul Franco on patient status and stated to have respiratory therapy come assess patient. 1040 - Respiratory therapy contacted and to come see patient. Never smoker